# Patient Record
Sex: MALE | Race: WHITE | NOT HISPANIC OR LATINO | Employment: PART TIME | URBAN - METROPOLITAN AREA
[De-identification: names, ages, dates, MRNs, and addresses within clinical notes are randomized per-mention and may not be internally consistent; named-entity substitution may affect disease eponyms.]

---

## 2016-12-14 LAB — CRC RECOMMENDATION EXT: NORMAL

## 2017-01-19 ENCOUNTER — OFFICE VISIT (OUTPATIENT)
Dept: FAMILY MEDICINE | Facility: CLINIC | Age: 62
End: 2017-01-19
Payer: COMMERCIAL

## 2017-01-19 VITALS
HEART RATE: 82 BPM | SYSTOLIC BLOOD PRESSURE: 128 MMHG | TEMPERATURE: 98 F | BODY MASS INDEX: 39.97 KG/M2 | DIASTOLIC BLOOD PRESSURE: 70 MMHG | RESPIRATION RATE: 20 BRPM | HEIGHT: 71 IN | WEIGHT: 285.5 LBS

## 2017-01-19 DIAGNOSIS — J32.9 SINUSITIS, UNSPECIFIED CHRONICITY, UNSPECIFIED LOCATION: Primary | ICD-10-CM

## 2017-01-19 PROCEDURE — 1159F MED LIST DOCD IN RCRD: CPT | Mod: S$GLB,,, | Performed by: FAMILY MEDICINE

## 2017-01-19 PROCEDURE — 3078F DIAST BP <80 MM HG: CPT | Mod: S$GLB,,, | Performed by: FAMILY MEDICINE

## 2017-01-19 PROCEDURE — 99213 OFFICE O/P EST LOW 20 MIN: CPT | Mod: 25,S$GLB,, | Performed by: FAMILY MEDICINE

## 2017-01-19 PROCEDURE — 3074F SYST BP LT 130 MM HG: CPT | Mod: S$GLB,,, | Performed by: FAMILY MEDICINE

## 2017-01-19 PROCEDURE — 96372 THER/PROPH/DIAG INJ SC/IM: CPT | Mod: S$GLB,,, | Performed by: FAMILY MEDICINE

## 2017-01-19 PROCEDURE — 99999 PR PBB SHADOW E&M-EST. PATIENT-LVL II: CPT | Mod: PBBFAC,,, | Performed by: FAMILY MEDICINE

## 2017-01-19 RX ORDER — METHYLPREDNISOLONE ACETATE 40 MG/ML
60 INJECTION, SUSPENSION INTRA-ARTICULAR; INTRALESIONAL; INTRAMUSCULAR; SOFT TISSUE
Status: COMPLETED | OUTPATIENT
Start: 2017-01-19 | End: 2017-01-19

## 2017-01-19 RX ORDER — CLARITHROMYCIN 500 MG/1
500 TABLET, FILM COATED ORAL 2 TIMES DAILY
Qty: 20 TABLET | Refills: 0 | Status: SHIPPED | OUTPATIENT
Start: 2017-01-19 | End: 2017-01-29

## 2017-01-19 RX ADMIN — METHYLPREDNISOLONE ACETATE 60 MG: 40 INJECTION, SUSPENSION INTRA-ARTICULAR; INTRALESIONAL; INTRAMUSCULAR; SOFT TISSUE at 05:01

## 2017-01-19 NOTE — MR AVS SNAPSHOT
[facility] Medicine  [address] [place] [address]  Phone: [phone]  Fax: [phone]                  Shawn Badillodaro   2017 4:15 PM   Office Visit    Description:  Male : 1955   Provider:  Jaison Tesfaye MD   Department:  [facility]           Reason for Visit     Cough     Nasal Congestion           Diagnoses this Visit        Comments    Sinusitis, unspecified chronicity, unspecified location    -  Primary            To Do List           Goals (5 Years of Data)     None       These Medications        Disp Refills Start End    clarithromycin (BIAXIN) 500 MG tablet 20 tablet 0 2017    Take 1 tablet (500 mg total) by mouth 2 (two) times daily. - Oral    Pharmacy: [address] #: [phone]         [facility]     [facility] -  Assistance  Registered nurses in the [facility] Center provide clinical advisement, health education, appointment booking, and other advisory services.  Call for this free service at [phone].             Medications           Message regarding Medications     Verify the changes and/or additions to your medication regime listed below are the same as discussed with your clinician today.  If any of these changes or additions are incorrect, please notify your healthcare provider.        START taking these NEW medications        Refills    clarithromycin (BIAXIN) 500 MG tablet 0    Sig: Take 1 tablet (500 mg total) by mouth 2 (two) times daily.    Class: Normal    Route: Oral      These medications were administered today        Dose Freq    methylPREDNISolone acetate injection 60 mg 60 mg Clinic/HOD 1 time    Sig: Inject 1.5 mLs (60 mg total) into the muscle one time.    Class: Normal    Route: Intramuscular      STOP taking these medications     predniSONE (DELTASONE) 10 MG tablet Take 4 po q day x 4 days, then 3 po q day  "x 3 days, then 2 po q day x 2 days, then 1 po x 1           Verify that the below list of medications is an accurate representation of the medications you are currently taking.  If none reported, the list may be blank. If incorrect, please contact your healthcare provider. Carry this list with you in case of emergency.           Current Medications     albuterol 90 mcg/actuation inhaler Inhale 2 puffs into the lungs every 6 (six) hours as needed for Wheezing.    b complex vitamins capsule Take 1 capsule by mouth every other day.    cetirizine (ZYRTEC) 10 MG tablet Take 1 tablet by mouth Daily. 1 Tablet Oral Every day    diltiazem (DILACOR XR) 240 MG CDCR Take 240 mg by mouth once daily.     fish oil-dha-epa (FISH OIL) 1,200-144-216 mg Cap Take 1 tablet by mouth Daily. 2 Capsule Oral Every day    GLUCOSAMINE HCL/CHONDR GARVIN A NA (OSTEO BI-FLEX ORAL) Take by mouth once daily.    multivitamin (THERAGRAN) per tablet Take 2 tablets by mouth Daily. 1 Tablet Oral Every day    olmesartan-hydrochlorothiazide (BENICAR HCT) 40-25 mg per tablet Take 1 tablet by mouth Daily. 1 Tablet Oral Every day    pravastatin (PRAVACHOL) 40 MG tablet Take 1 tablet by mouth Daily. 1 Tablet Oral Every day    ranitidine (ZANTAC) 300 MG tablet Take 1 tablet by mouth Daily. 1 Tablet Oral Twice a day     rivaroxaban (XARELTO) 20 mg Tab Take 20 mg by mouth once daily. Dr Fail    SYMBICORT 160-4.5 mcg/actuation HFAA USE 2 INHALATIONS TWICE A DAY    clarithromycin (BIAXIN) 500 MG tablet Take 1 tablet (500 mg total) by mouth 2 (two) times daily.           Clinical Reference Information           Vital Signs - Last Recorded  Most recent update: 1/19/2017  4:55 PM by Karine Zabala LPN    BP Pulse Temp Resp    128/70 (BP Location: Right arm, Patient Position: Sitting, BP Method: Manual) 82 98 °F (36.7 °C) (Tympanic) 20    Ht Wt BMI    5' 11" (1.803 m) 129.5 kg (285 lb 7.9 oz) 39.82 kg/m2      Blood Pressure          Most Recent Value    BP  128/70 "      Allergies as of 1/19/2017     Augmentin [Amoxicillin-pot Clavulanate]    Niaspan Extended-release  [Niacin]    Phenergan [Promethazine]    Phenytoin Sodium Extended      Immunizations Administered on Date of Encounter - 1/19/2017     None

## 2017-01-19 NOTE — PROGRESS NOTES
Subjective:       Patient ID: Shawn Hand is a 61 y.o. male.    Chief Complaint: Cough and Nasal Congestion    Pt is 61 y.o. male who c/o 7 day h/o sinus congestion and h/a. Pos PND and cough.  No relief with OTC antihistamine/decongestant cold preparations. Severity is moderate.  Review of Systems   Constitutional: Negative for fever.   HENT: Positive for congestion, postnasal drip and sinus pressure.    Respiratory: Positive for cough.        Objective:    Physical Exam   Constitutional: He appears well-developed and well-nourished.   HENT:   Head: Normocephalic and atraumatic.   Right Ear: Tympanic membrane and external ear normal.   Left Ear: Tympanic membrane and external ear normal.   Nose: Mucosal edema and rhinorrhea present.   Mouth/Throat: Uvula is midline.   Eyes: Conjunctivae are normal.   Neck: Trachea normal and normal range of motion. Neck supple. No thyromegaly present.   Cardiovascular: Normal rate, regular rhythm, S1 normal, S2 normal and normal heart sounds.    No murmur heard.  Pulmonary/Chest: Effort normal. No respiratory distress.   Abdominal: Soft. Normal appearance. There is no tenderness.   Musculoskeletal: Normal range of motion.   Lymphadenopathy:     He has no cervical adenopathy.   Neurological: He is alert.   Skin: Skin is warm, dry and intact.   Psychiatric: He has a normal mood and affect. His speech is normal.   Vitals reviewed.      Assessment:       1. Sinusitis, unspecified chronicity, unspecified location        Plan:   Shawn HANNAH Sr was seen today for cough and nasal congestion.    Diagnoses and all orders for this visit:    Sinusitis, unspecified chronicity, unspecified location  -     methylPREDNISolone acetate injection 60 mg; Inject 1.5 mLs (60 mg total) into the muscle one time.  -     clarithromycin (BIAXIN) 500 MG tablet; Take 1 tablet (500 mg total) by mouth 2 (two) times daily.      No Follow-up on file.

## 2017-02-14 ENCOUNTER — OFFICE VISIT (OUTPATIENT)
Dept: FAMILY MEDICINE | Facility: CLINIC | Age: 62
End: 2017-02-14
Payer: COMMERCIAL

## 2017-02-14 VITALS
WEIGHT: 294 LBS | HEIGHT: 71 IN | HEART RATE: 96 BPM | OXYGEN SATURATION: 96 % | TEMPERATURE: 98 F | SYSTOLIC BLOOD PRESSURE: 138 MMHG | RESPIRATION RATE: 20 BRPM | DIASTOLIC BLOOD PRESSURE: 80 MMHG | BODY MASS INDEX: 41.16 KG/M2

## 2017-02-14 DIAGNOSIS — R09.81 CONGESTION OF NASAL SINUS: ICD-10-CM

## 2017-02-14 DIAGNOSIS — J43.9 PULMONARY EMPHYSEMA, UNSPECIFIED EMPHYSEMA TYPE: ICD-10-CM

## 2017-02-14 DIAGNOSIS — R05.9 COUGH: Primary | ICD-10-CM

## 2017-02-14 DIAGNOSIS — J30.2 SEASONAL ALLERGIC RHINITIS, UNSPECIFIED ALLERGIC RHINITIS TRIGGER: ICD-10-CM

## 2017-02-14 PROCEDURE — 99213 OFFICE O/P EST LOW 20 MIN: CPT | Mod: 25,S$GLB,, | Performed by: FAMILY MEDICINE

## 2017-02-14 PROCEDURE — 99999 PR PBB SHADOW E&M-EST. PATIENT-LVL III: CPT | Mod: PBBFAC,,, | Performed by: FAMILY MEDICINE

## 2017-02-14 PROCEDURE — 3075F SYST BP GE 130 - 139MM HG: CPT | Mod: S$GLB,,, | Performed by: FAMILY MEDICINE

## 2017-02-14 PROCEDURE — 96372 THER/PROPH/DIAG INJ SC/IM: CPT | Mod: S$GLB,,, | Performed by: FAMILY MEDICINE

## 2017-02-14 PROCEDURE — 3079F DIAST BP 80-89 MM HG: CPT | Mod: S$GLB,,, | Performed by: FAMILY MEDICINE

## 2017-02-14 RX ORDER — MONTELUKAST SODIUM 10 MG/1
10 TABLET ORAL NIGHTLY
Qty: 30 TABLET | Refills: 5 | Status: SHIPPED | OUTPATIENT
Start: 2017-02-14 | End: 2017-03-16

## 2017-02-14 RX ORDER — METHYLPREDNISOLONE ACETATE 40 MG/ML
60 INJECTION, SUSPENSION INTRA-ARTICULAR; INTRALESIONAL; INTRAMUSCULAR; SOFT TISSUE
Status: COMPLETED | OUTPATIENT
Start: 2017-02-14 | End: 2017-02-14

## 2017-02-14 RX ORDER — ALBUTEROL SULFATE 1.25 MG/3ML
1.25 SOLUTION RESPIRATORY (INHALATION) EVERY 6 HOURS PRN
Qty: 90 ML | Refills: 5 | Status: SHIPPED | OUTPATIENT
Start: 2017-02-14 | End: 2018-03-27 | Stop reason: SDUPTHER

## 2017-02-14 RX ORDER — FLUTICASONE PROPIONATE 50 MCG
2 SPRAY, SUSPENSION (ML) NASAL DAILY
Qty: 1 BOTTLE | Refills: 5 | Status: SHIPPED | OUTPATIENT
Start: 2017-02-14 | End: 2017-03-16

## 2017-02-14 RX ADMIN — METHYLPREDNISOLONE ACETATE 60 MG: 40 INJECTION, SUSPENSION INTRA-ARTICULAR; INTRALESIONAL; INTRAMUSCULAR; SOFT TISSUE at 05:02

## 2017-02-14 NOTE — MR AVS SNAPSHOT
[address] [address]  Phone: [phone]  Fax: [phone]                  Shawn Hand   2017 3:45 PM   Office Visit    Description:  Male : 1955   Provider:  Jaison Tesfaye MD   Department:  [facility]           Reason for Visit     Nasal Congestion     Cough     Headache           Diagnoses this Visit        Comments    Cough    -  Primary     Congestion of nasal sinus         Seasonal allergic rhinitis, unspecified allergic rhinitis trigger         Pulmonary emphysema, unspecified emphysema type                To Do List           Goals (5 Years of Data)     None       These Medications        Disp Refills Start End    montelukast (SINGULAIR) 10 mg tablet 30 tablet 5 2017 3/16/2017    Take 1 tablet (10 mg total) by mouth every evening. - Oral    Pharmacy: [address] Ph #: [phone]       fluticasone (FLONASE) 50 mcg/actuation nasal spray 1 Bottle 5 2017 3/16/2017    2 sprays by Each Nare route once daily. - Each Nare    Pharmacy: [address] Ph #: [phone]       albuterol (ACCUNEB) 1.25 mg/3 mL Nebu 90 mL 5 2017    Take 3 mLs (1.25 mg total) by nebulization every 6 (six) hours as needed. Rescue - Nebulization    Pharmacy: [address] Ph #: [phone]         [facility]     [facility] On Call Nurse Care Line -  Assistance  Registered nurses in the [facility] provide clinical advisement, health education, appointment booking, and other advisory services.  Call for this free service at [phone].             Medications           Message regarding Medications     Verify the changes and/or additions to your medication regime listed below are the same as discussed with your clinician today.  If any of these changes or  additions are incorrect, please notify your healthcare provider.        START taking these NEW medications        Refills    montelukast (SINGULAIR) 10 mg tablet 5    Sig: Take 1 tablet (10 mg total) by mouth every evening.    Class: Print    Route: Oral    fluticasone (FLONASE) 50 mcg/actuation nasal spray 5    Si sprays by Each Nare route once daily.    Class: Normal    Route: Each Nare    albuterol (ACCUNEB) 1.25 mg/3 mL Nebu 5    Sig: Take 3 mLs (1.25 mg total) by nebulization every 6 (six) hours as needed. Rescue    Class: Normal    Route: Nebulization      These medications were administered today        Dose Freq    methylPREDNISolone acetate injection 60 mg 60 mg Clinic/HOD 1 time    Sig: Inject 1.5 mLs (60 mg total) into the muscle one time.    Class: Normal    Route: Intramuscular           Verify that the below list of medications is an accurate representation of the medications you are currently taking.  If none reported, the list may be blank. If incorrect, please contact your healthcare provider. Carry this list with you in case of emergency.           Current Medications     albuterol 90 mcg/actuation inhaler Inhale 2 puffs into the lungs every 6 (six) hours as needed for Wheezing.    b complex vitamins capsule Take 1 capsule by mouth every other day.    cetirizine (ZYRTEC) 10 MG tablet Take 1 tablet by mouth Daily. 1 Tablet Oral Every day    diltiazem (DILACOR XR) 240 MG CDCR Take 240 mg by mouth once daily.     fish oil-dha-epa (FISH OIL) 1,200-144-216 mg Cap Take 1 tablet by mouth Daily. 2 Capsule Oral Every day    GLUCOSAMINE HCL/CHONDR GARVIN A NA (OSTEO BI-FLEX ORAL) Take by mouth once daily.    multivitamin (THERAGRAN) per tablet Take 2 tablets by mouth Daily. 1 Tablet Oral Every day    olmesartan-hydrochlorothiazide (BENICAR HCT) 40-25 mg per tablet Take 1 tablet by mouth Daily. 1 Tablet Oral Every day    pravastatin (PRAVACHOL) 40 MG tablet Take 1 tablet by mouth Daily. 1 Tablet Oral Every  "day    ranitidine (ZANTAC) 300 MG tablet Take 1 tablet by mouth Daily. 1 Tablet Oral Twice a day     rivaroxaban (XARELTO) 20 mg Tab Take 20 mg by mouth once daily. Dr Seamus    SYMBICORT 160-4.5 mcg/actuation HFAA USE 2 INHALATIONS TWICE A DAY    albuterol (ACCUNEB) 1.25 mg/3 mL Nebu Take 3 mLs (1.25 mg total) by nebulization every 6 (six) hours as needed. Rescue    fluticasone (FLONASE) 50 mcg/actuation nasal spray 2 sprays by Each Nare route once daily.    montelukast (SINGULAIR) 10 mg tablet Take 1 tablet (10 mg total) by mouth every evening.           Clinical Reference Information           Your Vitals Were     BP Pulse Temp Resp Height Weight    138/80 (BP Location: Left arm, Patient Position: Sitting, BP Method: Manual) 96 98.2 °F (36.8 °C) (Tympanic) 20 5' 11" (1.803 m) 133.4 kg (294 lb)    SpO2 BMI             96% 41 kg/m2         Blood Pressure          Most Recent Value    BP  138/80      Allergies as of 2/14/2017     Augmentin [Amoxicillin-pot Clavulanate]    Niaspan Extended-release  [Niacin]    Phenergan [Promethazine]    Phenytoin Sodium Extended      Immunizations Administered on Date of Encounter - 2/14/2017     None      Language Assistance Services     ATTENTION: Language assistance services are available, free of charge. Please call [phone].      ATENCIÓN: Si habla español, tiene a delacruz disposición servicios gratuitos de asistencia lingüística. Llame al [phone].     CHÚ Ý: N?u b?n nói Ti?ng Vi?t, có các d?ch v? h? tr? ngôn ng? mi?n phí dành cho b?n. G?i s? [phone].         [facility] complies with applicable Federal civil rights laws and does not discriminate on the basis of race, color, national origin, age, disability, or sex.        "

## 2017-02-14 NOTE — PROGRESS NOTES
Subjective:       Patient ID: Shawn Hand is a 61 y.o. male.    Chief Complaint: Nasal Congestion; Cough; and Headache    Pt is a 61 y.o. male who presents for evaluation and management of   Encounter Diagnoses   Name Primary?    Cough Yes    Congestion of nasal sinus     Seasonal allergic rhinitis, unspecified allergic rhinitis trigger     Pulmonary emphysema, unspecified emphysema type    .  Doing well on current meds. Denies any side effects. Prevention is up to date.    Review of Systems   Constitutional: Negative for fatigue and fever.   HENT: Positive for congestion, postnasal drip, rhinorrhea, sinus pressure and sneezing.    Eyes: Positive for itching.   Respiratory: Negative for cough and wheezing.    Cardiovascular: Negative for chest pain and palpitations.   Gastrointestinal: Negative for diarrhea, nausea and vomiting.   Genitourinary: Negative.    Musculoskeletal: Negative.    Skin: Negative.    Neurological: Negative.  Negative for headaches.   Hematological: Negative for adenopathy.   Psychiatric/Behavioral: Negative.        Objective:      Physical Exam   Constitutional: He is oriented to person, place, and time. He appears well-developed and well-nourished.   HENT:   Head: Normocephalic and atraumatic.   Right Ear: External ear normal.   Left Ear: External ear normal.   Mouth/Throat: Oropharynx is clear and moist.   Clear rhinorrhea   Eyes: Conjunctivae and EOM are normal. Pupils are equal, round, and reactive to light. Right eye exhibits discharge. Left eye exhibits no discharge. No scleral icterus.   Clear watery d/c   Neck: Normal range of motion. Neck supple. No JVD present. No tracheal deviation present. No thyromegaly present.   Cardiovascular: Normal rate, regular rhythm, normal heart sounds and intact distal pulses.    No murmur heard.  Pulmonary/Chest: Effort normal and breath sounds normal. No respiratory distress. He has no wheezes. He has no rales. He exhibits no tenderness.    Abdominal: Soft. Bowel sounds are normal. He exhibits no distension and no mass. There is no tenderness. There is no rebound and no guarding.   Genitourinary: Penis normal. No penile tenderness.   Musculoskeletal: Normal range of motion.   Lymphadenopathy:     He has no cervical adenopathy.   Neurological: He is alert and oriented to person, place, and time. He has normal reflexes. No cranial nerve deficit. He exhibits normal muscle tone. Coordination normal.   Skin: Skin is warm and dry.   Psychiatric: He has a normal mood and affect. His behavior is normal. Judgment and thought content normal.       Assessment:       1. Cough    2. Congestion of nasal sinus    3. Seasonal allergic rhinitis, unspecified allergic rhinitis trigger    4. Pulmonary emphysema, unspecified emphysema type        Plan:   Shawn HANNAH Sr was seen today for nasal congestion, cough and headache.    Diagnoses and all orders for this visit:    Cough    Congestion of nasal sinus    Seasonal allergic rhinitis, unspecified allergic rhinitis trigger  -     montelukast (SINGULAIR) 10 mg tablet; Take 1 tablet (10 mg total) by mouth every evening.  -     fluticasone (FLONASE) 50 mcg/actuation nasal spray; 2 sprays by Each Nare route once daily.  -     albuterol (ACCUNEB) 1.25 mg/3 mL Nebu; Take 3 mLs (1.25 mg total) by nebulization every 6 (six) hours as needed. Rescue  -     methylPREDNISolone acetate injection 60 mg; Inject 1.5 mLs (60 mg total) into the muscle one time.    Pulmonary emphysema, unspecified emphysema type  -     methylPREDNISolone acetate injection 60 mg; Inject 1.5 mLs (60 mg total) into the muscle one time.      No Follow-up on file.

## 2017-04-10 ENCOUNTER — OFFICE VISIT (OUTPATIENT)
Dept: FAMILY MEDICINE | Facility: CLINIC | Age: 62
End: 2017-04-10
Payer: COMMERCIAL

## 2017-04-10 VITALS
RESPIRATION RATE: 16 BRPM | WEIGHT: 284.38 LBS | HEART RATE: 80 BPM | DIASTOLIC BLOOD PRESSURE: 76 MMHG | HEIGHT: 72 IN | SYSTOLIC BLOOD PRESSURE: 132 MMHG | BODY MASS INDEX: 38.52 KG/M2

## 2017-04-10 DIAGNOSIS — J30.1 SEASONAL ALLERGIC RHINITIS DUE TO POLLEN: Primary | ICD-10-CM

## 2017-04-10 PROCEDURE — 3075F SYST BP GE 130 - 139MM HG: CPT | Mod: S$GLB,,, | Performed by: FAMILY MEDICINE

## 2017-04-10 PROCEDURE — 99213 OFFICE O/P EST LOW 20 MIN: CPT | Mod: S$GLB,,, | Performed by: FAMILY MEDICINE

## 2017-04-10 PROCEDURE — 3078F DIAST BP <80 MM HG: CPT | Mod: S$GLB,,, | Performed by: FAMILY MEDICINE

## 2017-04-10 PROCEDURE — 99999 PR PBB SHADOW E&M-EST. PATIENT-LVL III: CPT | Mod: PBBFAC,,, | Performed by: FAMILY MEDICINE

## 2017-04-10 PROCEDURE — 1160F RVW MEDS BY RX/DR IN RCRD: CPT | Mod: S$GLB,,, | Performed by: FAMILY MEDICINE

## 2017-04-10 RX ORDER — MONTELUKAST SODIUM 10 MG/1
10 TABLET ORAL DAILY
COMMUNITY
Start: 2017-04-03 | End: 2018-08-30 | Stop reason: SDUPTHER

## 2017-04-10 RX ORDER — PROMETHAZINE HYDROCHLORIDE AND CODEINE PHOSPHATE 6.25; 1 MG/5ML; MG/5ML
5 SOLUTION ORAL NIGHTLY PRN
Qty: 118 ML | Refills: 0 | Status: SHIPPED | OUTPATIENT
Start: 2017-04-10 | End: 2017-04-20

## 2017-04-10 RX ORDER — METHYLPREDNISOLONE 4 MG/1
TABLET ORAL
Qty: 1 PACKAGE | Refills: 0 | Status: SHIPPED | OUTPATIENT
Start: 2017-04-10 | End: 2017-05-01

## 2017-04-10 NOTE — MR AVS SNAPSHOT
[facility] Medicine  [address] [place] [address]  Phone: [phone]  Fax: [phone]                  Shawn Geero   4/10/2017 6:45 PM   Office Visit    Description:  Male : 1955   Provider:  Jaison Tesfaye MD   Department:  [facility]           Reason for Visit     URI     Irregular Heart Beat           Diagnoses this Visit        Comments    Seasonal allergic rhinitis due to pollen    -  Primary            To Do List           Goals (5 Years of Data)     None       These Medications        Disp Refills Start End    methylPREDNISolone (MEDROL DOSEPACK) 4 mg tablet 1 Package 0 4/10/2017 2017    use as directed    Pharmacy: [address] #: [phone]         [facility] On Call     [facility] -  Assistance  Unless otherwise directed by your provider, please contact [facility], our nurse care line that is available for  assistance.     Registered nurses in the [facility] Center provide: appointment scheduling, clinical advisement, health education, and other advisory services.  Call: [phone] (toll free)               Medications           Message regarding Medications     Verify the changes and/or additions to your medication regime listed below are the same as discussed with your clinician today.  If any of these changes or additions are incorrect, please notify your healthcare provider.        START taking these NEW medications        Refills    methylPREDNISolone (MEDROL DOSEPACK) 4 mg tablet 0    Sig: use as directed    Class: Normal           Verify that the below list of medications is an accurate representation of the medications you are currently taking.  If none reported, the list may be blank. If incorrect, please contact your healthcare provider. Carry this list with you in case of emergency.           Current Medications     albuterol  (ACCUNEB) 1.25 mg/3 mL Nebu Take 3 mLs (1.25 mg total) by nebulization every 6 (six) hours as needed. Rescue    albuterol 90 mcg/actuation inhaler Inhale 2 puffs into the lungs every 6 (six) hours as needed for Wheezing.    b complex vitamins capsule Take 1 capsule by mouth every other day.    cetirizine (ZYRTEC) 10 MG tablet Take 1 tablet by mouth Daily. 1 Tablet Oral Every day    diltiazem (DILACOR XR) 240 MG CDCR Take 240 mg by mouth once daily.     fish oil-dha-epa (FISH OIL) 1,200-144-216 mg Cap Take 1 tablet by mouth Daily. 2 Capsule Oral Every day    GLUCOSAMINE HCL/CHONDR GARVIN A NA (OSTEO BI-FLEX ORAL) Take by mouth once daily.    multivitamin (THERAGRAN) per tablet Take 2 tablets by mouth Daily. 1 Tablet Oral Every day    olmesartan-hydrochlorothiazide (BENICAR HCT) 40-25 mg per tablet Take 1 tablet by mouth Daily. 1 Tablet Oral Every day    pravastatin (PRAVACHOL) 40 MG tablet Take 1 tablet by mouth Daily. 1 Tablet Oral Every day    ranitidine (ZANTAC) 300 MG tablet Take 1 tablet by mouth Daily. 1 Tablet Oral Twice a day     rivaroxaban (XARELTO) 20 mg Tab Take 20 mg by mouth once daily. Dr Way    SYMBICORT 160-4.5 mcg/actuation HFAA USE 2 INHALATIONS TWICE A DAY    methylPREDNISolone (MEDROL DOSEPACK) 4 mg tablet use as directed    montelukast (SINGULAIR) 10 mg tablet            Clinical Reference Information           Your Vitals Were     BP Pulse Resp Height Weight BMI    132/76 (BP Location: Left arm, Patient Position: Sitting, BP Method: Manual) 80 16 6' (1.829 m) 129 kg (284 lb 6.3 oz) 38.57 kg/m2      Blood Pressure          Most Recent Value    BP  132/76      Allergies as of 4/10/2017     Augmentin [Amoxicillin-pot Clavulanate]    Niaspan Extended-release  [Niacin]    Phenergan [Promethazine]    Phenytoin Sodium Extended      Immunizations Administered on Date of Encounter - 4/10/2017     None      Orders Placed During Today's Visit      Normal Orders This Visit    Ambulatory referral to Allergy        Language Assistance Services     ATTENTION: Language assistance services are available, free of charge. Please call [phone].      ATENCIÓN: Si habla kerry, tiene a delacruz disposición servicios gratuitos de asistencia lingüística. Llame al [phone].     CHÚ Ý: N?u b?n nói Ti?ng Vi?t, có các d?ch v? h? tr? ngôn ng? mi?n phí dành cho b?n. G?i s? [phone].         [facility] complies with applicable Federal civil rights laws and does not discriminate on the basis of race, color, national origin, age, disability, or sex.

## 2017-04-11 NOTE — PROGRESS NOTES
Subjective:       Patient ID: Shawn Hand is a 61 y.o. male.    Chief Complaint: URI (allergies ) and Irregular Heart Beat    Pt is 61 y.o. male who c/o 2 day h/o sinus congestion and h/a after cutting grass over weekend. Pos PND and cough.  No relief with OTC antihistamine/decongestant cold preparations. Severity is moderate.  Review of Systems   Constitutional: Negative for fatigue and fever.   HENT: Positive for congestion, postnasal drip, rhinorrhea, sinus pressure and sneezing.    Eyes: Positive for itching.   Respiratory: Negative for cough and wheezing.    Cardiovascular: Negative for chest pain and palpitations.   Gastrointestinal: Negative for diarrhea, nausea and vomiting.   Genitourinary: Negative.    Musculoskeletal: Negative.    Skin: Negative.    Neurological: Negative.  Negative for headaches.   Hematological: Negative for adenopathy.   Psychiatric/Behavioral: Negative.        Objective:    Physical Exam   Constitutional: He is oriented to person, place, and time. He appears well-developed and well-nourished.   HENT:   Head: Normocephalic and atraumatic.   Right Ear: External ear normal.   Left Ear: External ear normal.   Mouth/Throat: Oropharynx is clear and moist.   Clear rhinorrhea   Eyes: Conjunctivae and EOM are normal. Pupils are equal, round, and reactive to light. Right eye exhibits discharge. Left eye exhibits no discharge. No scleral icterus.   Clear watery d/c   Neck: Normal range of motion. Neck supple. No JVD present. No tracheal deviation present. No thyromegaly present.   Cardiovascular: Normal rate, regular rhythm, normal heart sounds and intact distal pulses.    No murmur heard.  Pulmonary/Chest: Effort normal and breath sounds normal. No respiratory distress. He has no wheezes. He has no rales. He exhibits no tenderness.   Abdominal: Soft. Bowel sounds are normal. He exhibits no distension and no mass. There is no tenderness. There is no rebound and no guarding.    Genitourinary: Penis normal. No penile tenderness.   Musculoskeletal: Normal range of motion.   Lymphadenopathy:     He has no cervical adenopathy.   Neurological: He is alert and oriented to person, place, and time. He has normal reflexes. No cranial nerve deficit. He exhibits normal muscle tone. Coordination normal.   Skin: Skin is warm and dry.   Psychiatric: He has a normal mood and affect. His behavior is normal. Judgment and thought content normal.       Assessment:       1. Seasonal allergic rhinitis due to pollen        Plan:   Shawn HANNAH Sr was seen today for uri and irregular heart beat.    Diagnoses and all orders for this visit:    Seasonal allergic rhinitis due to pollen  -     Ambulatory referral to Allergy  -     methylPREDNISolone (MEDROL DOSEPACK) 4 mg tablet; use as directed    Other orders  -     promethazine-codeine 6.25-10 mg/5 ml (PHENERGAN WITH CODEINE) 6.25-10 mg/5 mL syrup; Take 5 mLs by mouth nightly as needed for Cough.    continue singulair, zyrtec, flonase   No Follow-up on file.

## 2017-06-06 ENCOUNTER — TELEPHONE (OUTPATIENT)
Dept: FAMILY MEDICINE | Facility: CLINIC | Age: 62
End: 2017-06-06

## 2017-06-06 RX ORDER — CLARITHROMYCIN 500 MG/1
500 TABLET, FILM COATED ORAL EVERY 12 HOURS
Qty: 20 TABLET | Refills: 0 | Status: SHIPPED | OUTPATIENT
Start: 2017-06-06 | End: 2017-06-30

## 2017-06-06 NOTE — TELEPHONE ENCOUNTER
----- Message from Roman Barker sent at 2017  8:29 AM CDT -----  Contact: Wife - Sarah Hand  MRN: [id number]  : 1955  PCP: Jaison Tesfaye  Home Phone      [phone]  Work Phone      Not on file.  Mobile          [phone]      MESSAGE: sinus congestion again -- requesting refill on Biaxin -- uses [facility]    Call [phone]    PCP: Mera

## 2017-06-26 PROBLEM — M25.511 ACUTE PAIN OF RIGHT SHOULDER: Status: ACTIVE | Noted: 2017-06-26

## 2017-06-27 PROBLEM — M75.100 ROTATOR CUFF TEAR: Status: ACTIVE | Noted: 2017-06-27

## 2017-06-27 PROBLEM — M75.101 TEAR OF RIGHT ROTATOR CUFF: Status: ACTIVE | Noted: 2017-06-27

## 2017-06-30 ENCOUNTER — OFFICE VISIT (OUTPATIENT)
Dept: INTERNAL MEDICINE | Facility: CLINIC | Age: 62
End: 2017-06-30
Payer: COMMERCIAL

## 2017-06-30 ENCOUNTER — TELEPHONE (OUTPATIENT)
Dept: FAMILY MEDICINE | Facility: CLINIC | Age: 62
End: 2017-06-30

## 2017-06-30 VITALS
HEIGHT: 71 IN | TEMPERATURE: 98 F | BODY MASS INDEX: 42.28 KG/M2 | SYSTOLIC BLOOD PRESSURE: 122 MMHG | HEART RATE: 58 BPM | DIASTOLIC BLOOD PRESSURE: 78 MMHG | OXYGEN SATURATION: 95 % | WEIGHT: 302 LBS | RESPIRATION RATE: 20 BRPM

## 2017-06-30 DIAGNOSIS — E66.01 MORBID OBESITY WITH BMI OF 40.0-44.9, ADULT: ICD-10-CM

## 2017-06-30 DIAGNOSIS — I10 BENIGN ESSENTIAL HTN: ICD-10-CM

## 2017-06-30 DIAGNOSIS — I48.19 PERSISTENT ATRIAL FIBRILLATION: ICD-10-CM

## 2017-06-30 DIAGNOSIS — R60.9 EDEMA, UNSPECIFIED TYPE: Primary | ICD-10-CM

## 2017-06-30 PROCEDURE — 99999 PR PBB SHADOW E&M-EST. PATIENT-LVL IV: CPT | Mod: PBBFAC,,, | Performed by: NURSE PRACTITIONER

## 2017-06-30 PROCEDURE — 99214 OFFICE O/P EST MOD 30 MIN: CPT | Mod: S$GLB,,, | Performed by: NURSE PRACTITIONER

## 2017-06-30 RX ORDER — FUROSEMIDE 20 MG/1
20 TABLET ORAL DAILY
Qty: 30 TABLET | Refills: 0 | Status: SHIPPED | OUTPATIENT
Start: 2017-06-30 | End: 2017-12-27

## 2017-06-30 RX ORDER — POTASSIUM CHLORIDE 750 MG/1
10 TABLET, EXTENDED RELEASE ORAL DAILY
Qty: 30 TABLET | Refills: 0 | Status: SHIPPED | OUTPATIENT
Start: 2017-06-30 | End: 2017-10-02

## 2017-06-30 RX ORDER — DILTIAZEM HYDROCHLORIDE 240 MG/1
CAPSULE, EXTENDED RELEASE ORAL
COMMUNITY
Start: 2017-04-03 | End: 2018-04-30 | Stop reason: SDUPTHER

## 2017-06-30 NOTE — PROGRESS NOTES
Subjective:       Patient ID: Shawn Hand is a 61 y.o. male.    Chief Complaint: Edema (x 3 days - weight gain- swelling all over)    Patient is unknown, to me and presents with   Chief Complaint   Patient presents with    Edema     x 3 days - weight gain- swelling all over   .  Denies chest pain and shortness of breath.  Patient presents with LE edema since having surgery for rotator cuff on this past Tuesday. Wearing brace at this time. But since his sx he noticed weight gain and edema. Patient is not sob but worried about edema. Patient does have hx of a fib and is seeing [facility] in [place]. Feels fine other than having swelling to LE. Has copd as well   HPI  Review of Systems   Constitutional: Negative.  Negative for activity change, appetite change, chills, diaphoresis, fatigue, fever and unexpected weight change.   HENT: Negative.  Negative for congestion, ear discharge, ear pain, facial swelling, hearing loss, nosebleeds, postnasal drip, rhinorrhea, sinus pressure, sneezing, sore throat, tinnitus, trouble swallowing and voice change.    Eyes: Negative.  Negative for photophobia, pain, discharge, redness, itching and visual disturbance.   Respiratory: Negative.  Negative for apnea, cough, choking, chest tightness, shortness of breath, wheezing and stridor.    Cardiovascular: Positive for leg swelling. Negative for chest pain and palpitations.   Gastrointestinal: Negative for abdominal distention, abdominal pain, anal bleeding, blood in stool, constipation, diarrhea, nausea and vomiting.   Genitourinary: Negative.  Negative for difficulty urinating, discharge, dysuria, enuresis, flank pain, frequency, hematuria, penile pain, penile swelling, scrotal swelling, testicular pain and urgency.   Musculoskeletal: Positive for arthralgias. Negative for back pain, gait problem, joint swelling, myalgias, neck pain and neck stiffness.   Skin: Negative.  Negative for color change, pallor, rash and wound.   Neurological:  Negative for dizziness, tremors, seizures, syncope, facial asymmetry, speech difficulty, weakness, light-headedness, numbness and headaches.   Hematological: Negative for adenopathy. Does not bruise/bleed easily.   Psychiatric/Behavioral: Negative.  Negative for agitation, sleep disturbance and suicidal ideas. The patient is not nervous/anxious.        Objective:      Physical Exam   Constitutional: He is oriented to person, place, and time. He appears well-developed and well-nourished. No distress.   HENT:   Head: Normocephalic and atraumatic.   Right Ear: External ear normal.   Left Ear: External ear normal.   Nose: Nose normal.   Mouth/Throat: Oropharynx is clear and moist. No oropharyngeal exudate.   Eyes: Conjunctivae and EOM are normal. Pupils are equal, round, and reactive to light. Right eye exhibits no discharge. Left eye exhibits no discharge. No scleral icterus.   Neck: Normal range of motion. No JVD present. No tracheal deviation present. No thyromegaly present.   Cardiovascular: Normal rate, normal heart sounds and intact distal pulses.  An irregularly irregular rhythm present. Exam reveals no gallop and no friction rub.    No murmur heard.  Pulmonary/Chest: Effort normal. No stridor. No respiratory distress. He has wheezes. He has no rales. He exhibits no tenderness.   Abdominal: Soft. Bowel sounds are normal. He exhibits no distension and no mass. There is no tenderness. There is no rebound and no guarding.   Musculoskeletal: He exhibits edema and tenderness.   + 2 edema to LE. Splint to right shoulder    Lymphadenopathy:     He has no cervical adenopathy.   Neurological: He is alert and oriented to person, place, and time. He has normal reflexes. He displays normal reflexes. No cranial nerve deficit. He exhibits normal muscle tone. Coordination normal.   Skin: Skin is warm and dry. Capillary refill takes less than 2 seconds. No rash noted. He is not diaphoretic. No erythema. No pallor.   Psychiatric:  "He has a normal mood and affect. His behavior is normal. Judgment and thought content normal.   Nursing note and vitals reviewed.      Assessment:       1. Edema, unspecified type    2. Persistent atrial fibrillation    3. Benign essential HTN    4. Morbid obesity with BMI of 40.0-44.9, adult        Plan:   Shawn HANNAH Sr was seen today for edema.    Diagnoses and all orders for this visit:    Edema, unspecified type  -     furosemide (LASIX) 20 MG tablet; Take 1 tablet (20 mg total) by mouth once daily.  -     potassium chloride (KLOR-CON) 10 MEQ TbSR; Take 1 tablet (10 mEq total) by mouth once daily.    Persistent atrial fibrillation    Benign essential HTN    Morbid obesity with BMI of 40.0-44.9, adult    "This note will not be shared with the patient."  Will treat with lasix and kcl  Treat over weekend and will call me on Monday  Last GFR greater than 60 on 6/19  RTC as scheduled     "

## 2017-06-30 NOTE — TELEPHONE ENCOUNTER
----- Message from [place] sent at 2017  8:09 AM CDT -----  Contact: phyllis / wife  Shawn Hand  MRN: [id number]  : 1955  PCP: Jaison Tesfaye  Home Phone      [phone]  Work Phone      Not on file.  Mobile          [phone]      MESSAGE: madison pt, needs a call back asap, pt had rotator cuff surgery Tuesday, and since then he has put on 12 lbs, very swollen. Please call to advise.    Phone: [phone]

## 2017-07-03 ENCOUNTER — TELEPHONE (OUTPATIENT)
Dept: INTERNAL MEDICINE | Facility: CLINIC | Age: 62
End: 2017-07-03

## 2017-07-03 NOTE — TELEPHONE ENCOUNTER
----- Message from Zaynab Ta MA sent at 7/3/2017 12:49 PM CDT -----  Contact: Wife-Sarah Hand  MRN: [id number]  : 1955  PCP: Jaison Tesfaye  Home Phone      [phone]  Work Phone      Not on file.  Mobile          [phone]      MESSAGE: Patient started on fluid pill on Friday Night. His wife says he has lost 5 punds but his feet are swelling.      Please call Sarah  [phone]

## 2017-07-07 RX ORDER — CLARITHROMYCIN 500 MG/1
500 TABLET, FILM COATED ORAL EVERY 12 HOURS
Qty: 20 TABLET | Refills: 0 | Status: SHIPPED | OUTPATIENT
Start: 2017-07-07 | End: 2017-12-27

## 2017-08-15 DIAGNOSIS — J06.9 URI (UPPER RESPIRATORY INFECTION): ICD-10-CM

## 2017-08-15 RX ORDER — MONTELUKAST SODIUM 10 MG/1
TABLET ORAL
Qty: 30 TABLET | Refills: 10 | OUTPATIENT
Start: 2017-08-15

## 2017-08-16 DIAGNOSIS — J06.9 URI (UPPER RESPIRATORY INFECTION): ICD-10-CM

## 2017-08-16 RX ORDER — MONTELUKAST SODIUM 10 MG/1
TABLET ORAL
Qty: 30 TABLET | Refills: 10 | Status: SHIPPED | OUTPATIENT
Start: 2017-08-16 | End: 2017-10-02 | Stop reason: SDUPTHER

## 2017-10-02 ENCOUNTER — OFFICE VISIT (OUTPATIENT)
Dept: FAMILY MEDICINE | Facility: CLINIC | Age: 62
End: 2017-10-02
Payer: COMMERCIAL

## 2017-10-02 VITALS
HEIGHT: 71 IN | RESPIRATION RATE: 20 BRPM | WEIGHT: 296.06 LBS | DIASTOLIC BLOOD PRESSURE: 68 MMHG | BODY MASS INDEX: 41.45 KG/M2 | HEART RATE: 60 BPM | SYSTOLIC BLOOD PRESSURE: 128 MMHG

## 2017-10-02 DIAGNOSIS — E66.01 MORBID OBESITY: ICD-10-CM

## 2017-10-02 DIAGNOSIS — M79.672 PAIN OF LEFT HEEL: ICD-10-CM

## 2017-10-02 DIAGNOSIS — Z12.5 SCREENING FOR PROSTATE CANCER: ICD-10-CM

## 2017-10-02 DIAGNOSIS — R60.9 EDEMA, UNSPECIFIED TYPE: Primary | ICD-10-CM

## 2017-10-02 DIAGNOSIS — I10 ESSENTIAL HYPERTENSION: ICD-10-CM

## 2017-10-02 PROCEDURE — 99999 PR PBB SHADOW E&M-EST. PATIENT-LVL II: CPT | Mod: PBBFAC,,, | Performed by: FAMILY MEDICINE

## 2017-10-02 PROCEDURE — 99214 OFFICE O/P EST MOD 30 MIN: CPT | Mod: S$GLB,,, | Performed by: FAMILY MEDICINE

## 2017-10-02 RX ORDER — POTASSIUM CHLORIDE 750 MG/1
TABLET, EXTENDED RELEASE ORAL
COMMUNITY
Start: 2017-06-30 | End: 2017-10-02

## 2017-10-02 NOTE — PROGRESS NOTES
Subjective:       Patient ID: Shawn Hand is a 61 y.o. male.    Chief Complaint: Foot Pain (left foot) and Edema (in legs)    Pt is a 61 y.o. male who presents for evaluation and management of   Encounter Diagnoses   Name Primary?    Edema, unspecified type Yes    Essential hypertension     Pain of left heel     Morbid obesity     Screening for prostate cancer    .had not gotten his benicarHCT in 2 weeks and started swelling as a result. Now back on it for 3 days   Lasix was prescribed by Hua. He is diuresing well, finished lasix       Doing well on current meds. Denies any side effects. Prevention is up to date.    Review of Systems   Respiratory: Negative for shortness of breath.    Cardiovascular: Positive for leg swelling. Negative for chest pain.   Musculoskeletal:        Left heel pain        Objective:      Physical Exam   Constitutional: He is oriented to person, place, and time. He appears well-developed and well-nourished.   HENT:   Head: Normocephalic and atraumatic.   Right Ear: External ear normal.   Left Ear: External ear normal.   Nose: Nose normal.   Mouth/Throat: Oropharynx is clear and moist.   Eyes: Conjunctivae and EOM are normal. Pupils are equal, round, and reactive to light. Right eye exhibits no discharge. Left eye exhibits no discharge. No scleral icterus.   Neck: Normal range of motion. Neck supple. No JVD present. No tracheal deviation present. No thyromegaly present.   Cardiovascular: Normal rate, regular rhythm, normal heart sounds and intact distal pulses.    No murmur heard.  Pulmonary/Chest: Effort normal and breath sounds normal. No respiratory distress. He has no wheezes. He has no rales. He exhibits no tenderness.   Abdominal: Soft. Bowel sounds are normal. He exhibits no distension and no mass. There is no tenderness. There is no rebound and no guarding.   Musculoskeletal: Normal range of motion. He exhibits edema and tenderness.   TTP left heel    Lymphadenopathy:      He has no cervical adenopathy.   Neurological: He is alert and oriented to person, place, and time. He has normal reflexes. He displays normal reflexes. No cranial nerve deficit. He exhibits normal muscle tone. Coordination normal.   Skin: Skin is warm and dry.   Psychiatric: He has a normal mood and affect. His behavior is normal. Judgment and thought content normal.       Assessment:       1. Edema, unspecified type    2. Essential hypertension    3. Pain of left heel    4. Morbid obesity    5. Screening for prostate cancer        Plan:   Shawn HANNAH Sr was seen today for foot pain and edema.    Diagnoses and all orders for this visit:    Edema, unspecified type    Essential hypertension    Pain of left heel    Morbid obesity    Screening for prostate cancer  -     PSA, Screening; Future    arch support for heel  The patient is asked to make an attempt to improve diet and exercise patterns to aid in medical management of this problem.  5 small meals a day. Cut out white carbs: bread, rice, pasta, potatoes. Exercise/walk 5x/week for at least 30 minutes  .  Consider PT   continue ARB, HCT     No Follow-up on file.

## 2017-12-27 ENCOUNTER — OFFICE VISIT (OUTPATIENT)
Dept: FAMILY MEDICINE | Facility: CLINIC | Age: 62
End: 2017-12-27
Payer: COMMERCIAL

## 2017-12-27 VITALS
HEART RATE: 88 BPM | SYSTOLIC BLOOD PRESSURE: 120 MMHG | BODY MASS INDEX: 41.79 KG/M2 | WEIGHT: 298.5 LBS | DIASTOLIC BLOOD PRESSURE: 66 MMHG | HEIGHT: 71 IN | RESPIRATION RATE: 20 BRPM

## 2017-12-27 DIAGNOSIS — E78.5 HYPERLIPIDEMIA, UNSPECIFIED HYPERLIPIDEMIA TYPE: ICD-10-CM

## 2017-12-27 DIAGNOSIS — Z12.5 SCREENING FOR PROSTATE CANCER: ICD-10-CM

## 2017-12-27 DIAGNOSIS — J43.8 OTHER EMPHYSEMA: ICD-10-CM

## 2017-12-27 DIAGNOSIS — I10 ESSENTIAL HYPERTENSION: ICD-10-CM

## 2017-12-27 DIAGNOSIS — I48.19 PERSISTENT ATRIAL FIBRILLATION: ICD-10-CM

## 2017-12-27 DIAGNOSIS — R22.1 MASS OF LATERAL NECK: Primary | ICD-10-CM

## 2017-12-27 PROCEDURE — 99214 OFFICE O/P EST MOD 30 MIN: CPT | Mod: S$GLB,,, | Performed by: FAMILY MEDICINE

## 2017-12-27 PROCEDURE — 99999 PR PBB SHADOW E&M-EST. PATIENT-LVL III: CPT | Mod: PBBFAC,,, | Performed by: FAMILY MEDICINE

## 2017-12-28 NOTE — PROGRESS NOTES
Subjective:       Patient ID: Shawn Hand is a 62 y.o. male.    Chief Complaint: Follow-up    Pt is a 62 y.o. male who presents for evaluation and management of   Encounter Diagnoses   Name Primary?    Mass of lateral neck Yes    Essential hypertension     Other emphysema     Persistent atrial fibrillation     Hyperlipidemia, unspecified hyperlipidemia type    .  Doing well on current meds. Denies any side effects. Prevention is up to date.  Review of Systems   Constitutional: Negative for fever.   Respiratory: Negative for shortness of breath.    Cardiovascular: Negative for chest pain.   Gastrointestinal: Negative for anal bleeding and blood in stool.   Genitourinary: Negative for dysuria.   Neurological: Negative for dizziness and light-headedness.       Objective:      Physical Exam   Constitutional: He is oriented to person, place, and time. He appears well-developed and well-nourished.   HENT:   Head: Normocephalic and atraumatic.   Right Ear: External ear normal.   Left Ear: External ear normal.   Nose: Nose normal.   Mouth/Throat: Oropharynx is clear and moist.   Left neck with 1.5cm mass, not completely mobile. Somewhat indurated.  Doesn't feel like a typical LN    Eyes: Conjunctivae and EOM are normal. Pupils are equal, round, and reactive to light. Right eye exhibits no discharge. Left eye exhibits no discharge. No scleral icterus.   Neck: Normal range of motion. Neck supple. No JVD present. No tracheal deviation present. No thyromegaly present.   Cardiovascular: Normal rate, regular rhythm, normal heart sounds and intact distal pulses.    No murmur heard.  Pulmonary/Chest: Effort normal and breath sounds normal. No respiratory distress. He has no wheezes. He has no rales. He exhibits no tenderness.   Abdominal: Soft. Bowel sounds are normal. He exhibits no distension and no mass. There is no tenderness. There is no rebound and no guarding.   Musculoskeletal: Normal range of motion.    Lymphadenopathy:     He has no cervical adenopathy.   Neurological: He is alert and oriented to person, place, and time. He has normal reflexes. He displays normal reflexes. No cranial nerve deficit. He exhibits normal muscle tone. Coordination normal.   Skin: Skin is warm and dry.   Psychiatric: He has a normal mood and affect. His behavior is normal. Judgment and thought content normal.       Assessment:       1. Mass of lateral neck    2. Essential hypertension    3. Other emphysema    4. Persistent atrial fibrillation    5. Hyperlipidemia, unspecified hyperlipidemia type        Plan:   Shawn HANNAH  was seen today for follow-up.    Diagnoses and all orders for this visit:    Mass of lateral neck  -     US Soft Tissue Misc; Future    Essential hypertension    Other emphysema    Persistent atrial fibrillation    Hyperlipidemia, unspecified hyperlipidemia type    Screening for prostate cancer  -     PSA, Screening; Future    continue same see orders   Getting u/s to rule out abnormal LAD. If suspicious node, will get CT head and neck     rtc 6 months     No Follow-up on file.

## 2018-01-03 ENCOUNTER — HOSPITAL ENCOUNTER (OUTPATIENT)
Dept: RADIOLOGY | Facility: HOSPITAL | Age: 63
Discharge: HOME | End: 2018-01-03
Attending: FAMILY MEDICINE
Payer: COMMERCIAL

## 2018-01-03 DIAGNOSIS — R22.1 MASS OF LATERAL NECK: ICD-10-CM

## 2018-01-03 PROCEDURE — 76536 US EXAM OF HEAD AND NECK: CPT | Mod: 26,,, | Performed by: RADIOLOGY

## 2018-01-03 PROCEDURE — 76536 US EXAM OF HEAD AND NECK: CPT | Mod: TC

## 2018-01-17 ENCOUNTER — TELEPHONE (OUTPATIENT)
Dept: ADMINISTRATIVE | Facility: HOSPITAL | Age: 63
End: 2018-01-17

## 2018-02-09 DIAGNOSIS — J43.9 PULMONARY EMPHYSEMA, UNSPECIFIED EMPHYSEMA TYPE: ICD-10-CM

## 2018-02-09 RX ORDER — ALBUTEROL SULFATE 90 UG/1
AEROSOL, METERED RESPIRATORY (INHALATION)
Qty: 18 G | Refills: 4 | Status: SHIPPED | OUTPATIENT
Start: 2018-02-09 | End: 2018-04-30

## 2018-03-17 ENCOUNTER — OFFICE VISIT (OUTPATIENT)
Dept: FAMILY MEDICINE | Facility: CLINIC | Age: 63
End: 2018-03-17
Payer: COMMERCIAL

## 2018-03-17 VITALS
SYSTOLIC BLOOD PRESSURE: 130 MMHG | HEIGHT: 71 IN | DIASTOLIC BLOOD PRESSURE: 82 MMHG | BODY MASS INDEX: 40.04 KG/M2 | WEIGHT: 286 LBS | HEART RATE: 82 BPM | TEMPERATURE: 98 F | RESPIRATION RATE: 20 BRPM

## 2018-03-17 DIAGNOSIS — J43.9 PULMONARY EMPHYSEMA, UNSPECIFIED EMPHYSEMA TYPE: ICD-10-CM

## 2018-03-17 DIAGNOSIS — G89.29 CHRONIC LEFT SHOULDER PAIN: ICD-10-CM

## 2018-03-17 DIAGNOSIS — R05.9 COUGH: ICD-10-CM

## 2018-03-17 DIAGNOSIS — J44.9 CHRONIC OBSTRUCTIVE PULMONARY DISEASE, UNSPECIFIED COPD TYPE: ICD-10-CM

## 2018-03-17 DIAGNOSIS — M25.512 CHRONIC LEFT SHOULDER PAIN: ICD-10-CM

## 2018-03-17 DIAGNOSIS — J02.9 PHARYNGITIS, UNSPECIFIED ETIOLOGY: Primary | ICD-10-CM

## 2018-03-17 DIAGNOSIS — J32.9 SINUSITIS, UNSPECIFIED CHRONICITY, UNSPECIFIED LOCATION: ICD-10-CM

## 2018-03-17 PROCEDURE — 3079F DIAST BP 80-89 MM HG: CPT | Mod: CPTII,S$GLB,, | Performed by: NURSE PRACTITIONER

## 2018-03-17 PROCEDURE — 99999 PR PBB SHADOW E&M-EST. PATIENT-LVL IV: CPT | Mod: PBBFAC,,, | Performed by: NURSE PRACTITIONER

## 2018-03-17 PROCEDURE — 99213 OFFICE O/P EST LOW 20 MIN: CPT | Mod: 25,S$GLB,, | Performed by: NURSE PRACTITIONER

## 2018-03-17 PROCEDURE — 3075F SYST BP GE 130 - 139MM HG: CPT | Mod: CPTII,S$GLB,, | Performed by: NURSE PRACTITIONER

## 2018-03-17 PROCEDURE — 96372 THER/PROPH/DIAG INJ SC/IM: CPT | Mod: S$GLB,,, | Performed by: FAMILY MEDICINE

## 2018-03-17 RX ORDER — ALBUTEROL SULFATE 90 UG/1
2 AEROSOL, METERED RESPIRATORY (INHALATION) EVERY 6 HOURS PRN
Qty: 1 EACH | Refills: 5 | Status: SHIPPED | OUTPATIENT
Start: 2018-03-17 | End: 2020-12-07 | Stop reason: ALTCHOICE

## 2018-03-17 RX ORDER — CELECOXIB 200 MG/1
CAPSULE ORAL
COMMUNITY
Start: 2018-02-26 | End: 2018-03-17 | Stop reason: SDUPTHER

## 2018-03-17 RX ORDER — ALBUTEROL SULFATE 1.25 MG/3ML
SOLUTION RESPIRATORY (INHALATION)
COMMUNITY
Start: 2018-01-24 | End: 2021-12-10 | Stop reason: SDUPTHER

## 2018-03-17 RX ORDER — METHYLPREDNISOLONE ACETATE 40 MG/ML
40 INJECTION, SUSPENSION INTRA-ARTICULAR; INTRALESIONAL; INTRAMUSCULAR; SOFT TISSUE
Status: COMPLETED | OUTPATIENT
Start: 2018-03-17 | End: 2018-03-17

## 2018-03-17 RX ORDER — CLARITHROMYCIN 500 MG/1
500 TABLET, FILM COATED ORAL EVERY 12 HOURS
Qty: 20 TABLET | Refills: 0 | Status: SHIPPED | OUTPATIENT
Start: 2018-03-17 | End: 2018-03-27

## 2018-03-17 RX ORDER — PROMETHAZINE HYDROCHLORIDE AND CODEINE PHOSPHATE 6.25; 1 MG/5ML; MG/5ML
5 SOLUTION ORAL EVERY 4 HOURS PRN
Qty: 180 ML | Refills: 0 | Status: SHIPPED | OUTPATIENT
Start: 2018-03-17 | End: 2018-03-27

## 2018-03-17 RX ORDER — CELECOXIB 200 MG/1
200 CAPSULE ORAL DAILY
Qty: 30 CAPSULE | Refills: 5 | Status: SHIPPED | OUTPATIENT
Start: 2018-03-17 | End: 2018-04-30

## 2018-03-17 RX ADMIN — METHYLPREDNISOLONE ACETATE 40 MG: 40 INJECTION, SUSPENSION INTRA-ARTICULAR; INTRALESIONAL; INTRAMUSCULAR; SOFT TISSUE at 11:03

## 2018-03-17 NOTE — PROGRESS NOTES
Subjective:       Patient ID: Shawn Hand is a 62 y.o. male.    Chief Complaint: Sore Throat and Nasal Congestion    Sore Throat    The current episode started in the past 7 days. There has been no fever. Associated symptoms include congestion and coughing. Pertinent negatives include no abdominal pain, diarrhea, ear pain, headaches, shortness of breath or vomiting.     Review of Systems   Constitutional: Negative.  Negative for appetite change, fatigue and fever.   HENT: Positive for congestion and sore throat. Negative for ear pain.    Eyes: Negative.  Negative for visual disturbance.   Respiratory: Positive for cough. Negative for shortness of breath and wheezing.    Cardiovascular: Negative.    Gastrointestinal: Negative.  Negative for abdominal pain, diarrhea, nausea and vomiting.   Genitourinary: Negative.  Negative for difficulty urinating.   Musculoskeletal: Negative.    Skin: Negative.  Negative for rash.   Neurological: Negative.  Negative for headaches.   Psychiatric/Behavioral: Negative.  Negative for sleep disturbance. The patient is not nervous/anxious.    All other systems reviewed and are negative.      Objective:      Physical Exam   Constitutional: He appears well-developed and well-nourished.   HENT:   Head: Normocephalic and atraumatic.   Right Ear: Tympanic membrane and external ear normal.   Left Ear: Tympanic membrane and external ear normal.   Nose: Mucosal edema and rhinorrhea present.   Mouth/Throat: Uvula is midline. Posterior oropharyngeal erythema present.   Eyes: Conjunctivae are normal.   Neck: Trachea normal and normal range of motion. Neck supple. No thyromegaly present.   Cardiovascular: Normal rate, regular rhythm, S1 normal, S2 normal and normal heart sounds.    No murmur heard.  Pulmonary/Chest: Effort normal and breath sounds normal. No respiratory distress.   Coarse cough   Abdominal: Soft. Normal appearance.   Musculoskeletal: Normal range of motion.   Lymphadenopathy:      He has no cervical adenopathy.   Neurological: He is alert.   Skin: Skin is warm, dry and intact.   Psychiatric: He has a normal mood and affect. His speech is normal.   Nursing note and vitals reviewed.      Assessment:       1. Pharyngitis, unspecified etiology    2. Sinusitis, unspecified chronicity, unspecified location    3. Chronic obstructive pulmonary disease, unspecified COPD type    4. Cough    5. Pulmonary emphysema, unspecified emphysema type    6. Chronic left shoulder pain        Plan:   Shawn HANNAH Sr was seen today for sore throat and nasal congestion.    Diagnoses and all orders for this visit:    Pharyngitis, unspecified etiology  -     clarithromycin (BIAXIN) 500 MG tablet; Take 1 tablet (500 mg total) by mouth every 12 (twelve) hours.  -     methylPREDNISolone acetate injection 40 mg; Inject 1 mL (40 mg total) into the muscle one time.    Sinusitis, unspecified chronicity, unspecified location  -     clarithromycin (BIAXIN) 500 MG tablet; Take 1 tablet (500 mg total) by mouth every 12 (twelve) hours.  -     methylPREDNISolone acetate injection 40 mg; Inject 1 mL (40 mg total) into the muscle one time.    Chronic obstructive pulmonary disease, unspecified COPD type  -     albuterol 90 mcg/actuation inhaler; Inhale 2 puffs into the lungs every 6 (six) hours as needed for Wheezing.    Cough  -     promethazine-codeine 6.25-10 mg/5 ml (PHENERGAN WITH CODEINE) 6.25-10 mg/5 mL syrup; Take 5 mLs by mouth every 4 (four) hours as needed for Cough.    Pulmonary emphysema, unspecified emphysema type  -     albuterol 90 mcg/actuation inhaler; Inhale 2 puffs into the lungs every 6 (six) hours as needed for Wheezing.    Chronic left shoulder pain  -     celecoxib (CELEBREX) 200 MG capsule; Take 1 capsule (200 mg total) by mouth once daily.    RTC PRN

## 2018-03-27 DIAGNOSIS — J30.2 SEASONAL ALLERGIC RHINITIS: ICD-10-CM

## 2018-03-27 RX ORDER — ALBUTEROL SULFATE 1.25 MG/3ML
SOLUTION RESPIRATORY (INHALATION)
Qty: 1 BOX | Refills: 4 | Status: SHIPPED | OUTPATIENT
Start: 2018-03-27 | End: 2018-04-30 | Stop reason: SDUPTHER

## 2018-04-30 ENCOUNTER — TELEPHONE (OUTPATIENT)
Dept: FAMILY MEDICINE | Facility: CLINIC | Age: 63
End: 2018-04-30

## 2018-04-30 ENCOUNTER — OFFICE VISIT (OUTPATIENT)
Dept: FAMILY MEDICINE | Facility: CLINIC | Age: 63
End: 2018-04-30
Payer: COMMERCIAL

## 2018-04-30 VITALS
DIASTOLIC BLOOD PRESSURE: 72 MMHG | RESPIRATION RATE: 18 BRPM | SYSTOLIC BLOOD PRESSURE: 116 MMHG | HEIGHT: 71 IN | HEART RATE: 82 BPM | BODY MASS INDEX: 40.09 KG/M2 | WEIGHT: 286.38 LBS

## 2018-04-30 DIAGNOSIS — M54.50 ACUTE RIGHT-SIDED LOW BACK PAIN WITHOUT SCIATICA: Primary | ICD-10-CM

## 2018-04-30 PROCEDURE — 99999 PR PBB SHADOW E&M-EST. PATIENT-LVL III: CPT | Mod: PBBFAC,,, | Performed by: FAMILY MEDICINE

## 2018-04-30 PROCEDURE — 99213 OFFICE O/P EST LOW 20 MIN: CPT | Mod: S$GLB,,, | Performed by: FAMILY MEDICINE

## 2018-04-30 PROCEDURE — 3074F SYST BP LT 130 MM HG: CPT | Mod: CPTII,S$GLB,, | Performed by: FAMILY MEDICINE

## 2018-04-30 PROCEDURE — 3078F DIAST BP <80 MM HG: CPT | Mod: CPTII,S$GLB,, | Performed by: FAMILY MEDICINE

## 2018-04-30 RX ORDER — CYCLOBENZAPRINE HCL 10 MG
10 TABLET ORAL NIGHTLY PRN
Qty: 30 TABLET | Refills: 1 | Status: SHIPPED | OUTPATIENT
Start: 2018-04-30 | End: 2018-05-30

## 2018-04-30 RX ORDER — DICLOFENAC SODIUM 75 MG/1
75 TABLET, DELAYED RELEASE ORAL 2 TIMES DAILY PRN
Qty: 60 TABLET | Refills: 1 | Status: SHIPPED | OUTPATIENT
Start: 2018-04-30 | End: 2018-05-30

## 2018-04-30 NOTE — PROGRESS NOTES
Subjective:       Patient ID: Shawn Hand is a 62 y.o. male.    Chief Complaint: Hip Pain (Right hip pain x 3 days.)    Pt is a 62 y.o. male who presents for evaluation and management of   Encounter Diagnosis   Name Primary?    Acute right-sided low back pain without sciatica Yes   3 days   No known injury  Was in his car most of the day before it started hurting     Doing well on current meds. Denies any side effects. Prevention is up to date.    Review of Systems   Musculoskeletal: Negative for joint swelling.       Objective:      Physical Exam   Constitutional: He is oriented to person, place, and time. He appears well-developed and well-nourished.   HENT:   Head: Normocephalic and atraumatic.   Right Ear: External ear normal.   Left Ear: External ear normal.   Nose: Nose normal.   Mouth/Throat: Oropharynx is clear and moist.   Eyes: Conjunctivae and EOM are normal. Pupils are equal, round, and reactive to light. Right eye exhibits no discharge. Left eye exhibits no discharge. No scleral icterus.   Neck: Normal range of motion. Neck supple. No JVD present. No tracheal deviation present. No thyromegaly present.   Cardiovascular: Normal rate, regular rhythm, normal heart sounds and intact distal pulses.  Exam reveals no gallop.    No murmur heard.  Pulmonary/Chest: Effort normal and breath sounds normal. No respiratory distress. He has no wheezes. He has no rales. He exhibits no tenderness.   Abdominal: Soft. Bowel sounds are normal. He exhibits no distension and no mass. There is no tenderness. There is no rebound and no guarding.   Musculoskeletal: Normal range of motion. He exhibits tenderness.   ILL tenderness. Neg SLR   Lymphadenopathy:     He has no cervical adenopathy.   Neurological: He is alert and oriented to person, place, and time. He has normal reflexes. He displays normal reflexes. No cranial nerve deficit. He exhibits normal muscle tone. Coordination normal.   Skin: Skin is warm and dry.    Psychiatric: He has a normal mood and affect. His behavior is normal. Judgment and thought content normal.       Assessment:       1. Acute right-sided low back pain without sciatica        Plan:   Shawn HANNAH Sr was seen today for hip pain.    Diagnoses and all orders for this visit:    Acute right-sided low back pain without sciatica  -     cyclobenzaprine (FLEXERIL) 10 MG tablet; Take 1 tablet (10 mg total) by mouth nightly as needed for Muscle spasms.  -     diclofenac (VOLTAREN) 75 MG EC tablet; Take 1 tablet (75 mg total) by mouth 2 (two) times daily as needed.      No Follow-up on file.

## 2018-04-30 NOTE — TELEPHONE ENCOUNTER
----- Message from Yaa Reyna MA sent at 2018  9:22 AM CDT -----  Contact: Sarah Hand  MRN: [id number]  : 1955  PCP: Jaison Tesfaye  Home Phone      [phone]  Work Phone      Not on file.  Mobile          [phone]      MESSAGE:   Pt requests a sooner appointment than the  can schedule.  Does patient feel like they need to be seen today:  YES  What is the nature of the appointment:  Hip and leg pain  What visit type:  Urgent  Did you check other providers/department schedules for availability:   Yes-only wants to see Dr Tesfaye      Phone:  [phone]

## 2018-05-07 ENCOUNTER — TELEPHONE (OUTPATIENT)
Dept: FAMILY MEDICINE | Facility: CLINIC | Age: 63
End: 2018-05-07

## 2018-05-07 NOTE — TELEPHONE ENCOUNTER
----- Message from Roman Barker sent at 2018  9:37 AM CDT -----  Contact: Wife - Sarah Hand  MRN: [id number]  : 1955  PCP: Jaison Tesfaye  Home Phone      [phone]  Work Phone      Not on file.  Mobile          [phone]      MESSAGE: attending the [employer] -- requesting excuse not to do exercise again this week due to his pulled back  -- please advise    Call [phone]    PCP: Mera

## 2018-06-01 NOTE — TELEPHONE ENCOUNTER
----- Message from Selena Smyth sent at 2018 12:15 PM CDT -----  Contact: Self  Shawn aHnd  MRN: [id number]  : 1955  PCP: Jaison Tesfaye  Home Phone      [phone]  Work Phone      Not on file.  Mobile          [phone]      MESSAGE:   Pt requesting refill or new Rx.   Is this a refill or new RX:  refill  RX name and strength: SYMBICORT 160-4.5 mcg/actuation HFAA  Last office visit: 18  Is this a 30-day or 90-day RX:  30  Pharmacy name and location:  [facility]AN [facility]  Comments:      Phone:  [phone]

## 2018-06-03 RX ORDER — BUDESONIDE AND FORMOTEROL FUMARATE DIHYDRATE 160; 4.5 UG/1; UG/1
2 AEROSOL RESPIRATORY (INHALATION) 2 TIMES DAILY
Qty: 30.6 G | Refills: 3 | Status: SHIPPED | OUTPATIENT
Start: 2018-06-03 | End: 2018-10-01 | Stop reason: SDUPTHER

## 2018-08-18 ENCOUNTER — HOSPITAL ENCOUNTER (EMERGENCY)
Facility: HOSPITAL | Age: 63
Discharge: HOME | End: 2018-08-18
Attending: SURGERY
Payer: COMMERCIAL

## 2018-08-18 VITALS
DIASTOLIC BLOOD PRESSURE: 78 MMHG | WEIGHT: 285 LBS | OXYGEN SATURATION: 96 % | HEART RATE: 78 BPM | TEMPERATURE: 96 F | SYSTOLIC BLOOD PRESSURE: 127 MMHG | BODY MASS INDEX: 39.75 KG/M2 | RESPIRATION RATE: 16 BRPM

## 2018-08-18 DIAGNOSIS — M79.672 LEFT FOOT PAIN: ICD-10-CM

## 2018-08-18 DIAGNOSIS — M10.9 ACUTE GOUT OF LEFT FOOT, UNSPECIFIED CAUSE: Primary | ICD-10-CM

## 2018-08-18 LAB
ALBUMIN SERPL BCP-MCNC: 3.9 G/DL
ALP SERPL-CCNC: 45 U/L
ALT SERPL W/O P-5'-P-CCNC: 53 U/L
ANION GAP SERPL CALC-SCNC: 10 MMOL/L
AST SERPL-CCNC: 44 U/L
BASOPHILS # BLD AUTO: 0.11 K/UL
BASOPHILS NFR BLD: 0.8 %
BILIRUB SERPL-MCNC: 0.5 MG/DL
BUN SERPL-MCNC: 26 MG/DL
CALCIUM SERPL-MCNC: 9.4 MG/DL
CHLORIDE SERPL-SCNC: 104 MMOL/L
CO2 SERPL-SCNC: 26 MMOL/L
CREAT SERPL-MCNC: 1.9 MG/DL
DIFFERENTIAL METHOD: ABNORMAL
EOSINOPHIL # BLD AUTO: 0.6 K/UL
EOSINOPHIL NFR BLD: 4.9 %
ERYTHROCYTE [DISTWIDTH] IN BLOOD BY AUTOMATED COUNT: 14.4 %
EST. GFR  (AFRICAN AMERICAN): 43 ML/MIN/1.73 M^2
EST. GFR  (NON AFRICAN AMERICAN): 37 ML/MIN/1.73 M^2
GLUCOSE SERPL-MCNC: 97 MG/DL
HCT VFR BLD AUTO: 45.8 %
HGB BLD-MCNC: 15.6 G/DL
LYMPHOCYTES # BLD AUTO: 2.6 K/UL
LYMPHOCYTES NFR BLD: 19.9 %
MCH RBC QN AUTO: 29.9 PG
MCHC RBC AUTO-ENTMCNC: 34.1 G/DL
MCV RBC AUTO: 88 FL
MONOCYTES # BLD AUTO: 1.5 K/UL
MONOCYTES NFR BLD: 11.5 %
NEUTROPHILS # BLD AUTO: 8.2 K/UL
NEUTROPHILS NFR BLD: 62.9 %
PLATELET # BLD AUTO: 294 K/UL
PMV BLD AUTO: 9.9 FL
POTASSIUM SERPL-SCNC: 4 MMOL/L
PROT SERPL-MCNC: 7.3 G/DL
RBC # BLD AUTO: 5.21 M/UL
SODIUM SERPL-SCNC: 140 MMOL/L
URATE SERPL-MCNC: 7.7 MG/DL
WBC # BLD AUTO: 13.06 K/UL

## 2018-08-18 PROCEDURE — 63600175 PHARM REV CODE 636 W HCPCS: Performed by: SURGERY

## 2018-08-18 PROCEDURE — 84550 ASSAY OF BLOOD/URIC ACID: CPT

## 2018-08-18 PROCEDURE — 80053 COMPREHEN METABOLIC PANEL: CPT

## 2018-08-18 PROCEDURE — 36415 COLL VENOUS BLD VENIPUNCTURE: CPT

## 2018-08-18 PROCEDURE — 85025 COMPLETE CBC W/AUTO DIFF WBC: CPT

## 2018-08-18 PROCEDURE — 99284 EMERGENCY DEPT VISIT MOD MDM: CPT | Mod: 25

## 2018-08-18 PROCEDURE — 96372 THER/PROPH/DIAG INJ SC/IM: CPT

## 2018-08-18 RX ORDER — MORPHINE SULFATE 2 MG/ML
2 INJECTION, SOLUTION INTRAMUSCULAR; INTRAVENOUS
Status: COMPLETED | OUTPATIENT
Start: 2018-08-18 | End: 2018-08-18

## 2018-08-18 RX ORDER — DICLOFENAC SODIUM AND MISOPROSTOL 75; 200 MG/1; UG/1
1 TABLET, DELAYED RELEASE ORAL 2 TIMES DAILY
COMMUNITY
End: 2018-08-18 | Stop reason: ALTCHOICE

## 2018-08-18 RX ORDER — ALLOPURINOL 100 MG/1
100 TABLET ORAL DAILY
Qty: 30 TABLET | Refills: 0 | Status: SHIPPED | OUTPATIENT
Start: 2018-08-18 | End: 2018-09-18 | Stop reason: SDUPTHER

## 2018-08-18 RX ORDER — ONDANSETRON 2 MG/ML
4 INJECTION INTRAMUSCULAR; INTRAVENOUS
Status: COMPLETED | OUTPATIENT
Start: 2018-08-18 | End: 2018-08-18

## 2018-08-18 RX ORDER — COLCHICINE 0.6 MG/1
0.6 TABLET ORAL DAILY PRN
Qty: 20 TABLET | Refills: 0 | Status: SHIPPED | OUTPATIENT
Start: 2018-08-18 | End: 2018-09-18 | Stop reason: SDUPTHER

## 2018-08-18 RX ORDER — HYDROCODONE BITARTRATE AND IBUPROFEN 7.5; 2 MG/1; MG/1
1 TABLET, FILM COATED ORAL EVERY 6 HOURS PRN
Qty: 15 TABLET | Refills: 0 | Status: SHIPPED | OUTPATIENT
Start: 2018-08-18 | End: 2018-08-28

## 2018-08-18 RX ADMIN — Medication 2 MG: at 08:08

## 2018-08-18 RX ADMIN — ONDANSETRON 4 MG: 2 INJECTION INTRAMUSCULAR; INTRAVENOUS at 08:08

## 2018-08-19 NOTE — ED PROVIDER NOTES
[facility]                                                 Chief Complaint  62 y.o. male with Foot Injury (left)    History of Present Illness  Shawn Hand presents to the emergency room with left foot pain today  Patient states he has had on again off again left foot pain for last couple weeks  Patient has left foot erythema, left great toe swelling, denies any acute trauma  Patient was diagnosed with plantar fasciitis weeks ago with no improvement  Patient has had several steroid shots to the left foot, continued pain at this time  Patient is neurovascular intact with no evidence of infection, uric acid is over 7    The history is provided by the patient   device was not used during this ER visit    Past Medical History   -- Atrial fibrillation    -- COPD (chronic obstructive pulmonary disease)    -- Coronary artery disease    -- GERD (gastroesophageal reflux disease)    -- Hyperlipidemia    -- Hypertension    -- Sleep apnea      Past Surgical History   -- CARDIOVERSION     -- CHOLECYSTECTOMY     -- COLONOSCOPY     -- CORONARY ANGIOPLASTY WITH STENT PLACEMENT     -- ROTATOR CUFF REPAIR     -- ROTATOR CUFF REPAIR     -- SINUS SURGERY     -- TONSILLECTOMY        Review of patient's allergies   -- Niaspan extended-release [niacin]    -- Augmentin [amoxicillin-pot clavulanate]      Review of Systems and Physical Exam      Review of Systems  -- Constitution - no fever, denies fatigue, no weakness, no chills  -- Eyes - no tearing or redness, no visual disturbance  -- Ear, Nose - no tinnitus or earache, no nasal congestion or discharge  -- Mouth,Throat - no sore throat, no toothache, normal voice, normal swallowing  -- Respiratory - denies cough and congestion, no shortness of breath, no GARCIA  -- Cardiovascular - denies chest pain, no palpitations, denies claudication  -- Gastrointestinal - denies abdominal pain, nausea, vomiting, or diarrhea  -- Genitourinary - no dysuria,  denies flank pain, no hematuria, no STD risk  -- Musculoskeletal - left foot pain, negative for myalgias and arthralgias   -- Neurological - no headache, denies weakness or seizure; no LOC  -- Skin - denies pallor, rash, or changes in skin. no hives or welts noted    /78   Pulse 78   Temp 96 °F (35.6 °C) (Oral)   Resp 16    Physical Exam  -- Nursing note and vitals reviewed  -- Constitutional: Appears well-developed and well-nourished  -- Head: Atraumatic. Normocephalic. No obvious abnormality  -- Eyes: Pupils are equal and reactive to light. Normal conjunctiva and lids  -- Cardiac: Normal rate, regular rhythm and normal heart sounds  -- Pulmonary: Normal respiratory effort, breath sounds clear to auscultation  -- Abdominal: Soft, no tenderness. Normal bowel sounds. Normal liver edge  -- Musculoskeletal: Normal range of motion, no effusions. Joints stable   -- Neurological: No focal deficits. Showed good interaction with staff  -- Vascular: Posterior tibial, dorsalis pedis and radial pulses 2+ bilaterally    -- Lymphatics: No cervical or peripheral lymphadenopathy. No edema noted  -- Skin: Left great toe redness in dorsal foot erythema    Emergency Room Course      Lab Results     K 4.0      CO2 26   BUN 26 (H)   CREATININE 1.9 (H)   GLU 97   ALKPHOS 45 (L)   AST 44 (H)   ALT 53 (H)   BILITOT 0.5   ALBUMIN 3.9   PROT 7.3   WBC 13.06 (H)   HGB 15.6   HCT 45.8        Radiology  -- Preliminary ER x-ray readings showed no evidence of fracture or dislocation  -- All x-rays are reviewed with a final disposition given by the radiologist    Medications Given  morphine injection 2 mg (2 mg Intramuscular Given 8/18/18 2021)   ondansetron injection 4 mg (4 mg Intramuscular Given 8/18/18 2021)     Diagnosis  -- The primary encounter diagnosis was Acute gout of left foot, unspecified cause.   -- A diagnosis of Left foot pain was also pertinent to this visit.    Disposition and Plan  -- Disposition:  home  -- Condition: stable  -- Follow-up: Patient to follow up with Jaison Tesfaye MD in 1-2 days.  -- I advised the patient that we have found no life threatening condition today  -- At this time, I believe the patient is clinically stable for discharge.   -- The patient acknowledges that close follow up with a MD is required   -- Patient agrees to comply with all instruction and direction given in the ER    This note is dictated on Dragon Natural Speaking word recognition program.  There are word recognition mistakes that are occasionally missed on review.         Jeevan aGrza MD  08/18/18 2949

## 2018-08-21 ENCOUNTER — NURSE TRIAGE (OUTPATIENT)
Dept: ADMINISTRATIVE | Facility: CLINIC | Age: 63
End: 2018-08-21

## 2018-08-21 ENCOUNTER — OFFICE VISIT (OUTPATIENT)
Dept: FAMILY MEDICINE | Facility: CLINIC | Age: 63
End: 2018-08-21
Payer: COMMERCIAL

## 2018-08-21 VITALS
BODY MASS INDEX: 40.74 KG/M2 | WEIGHT: 291 LBS | SYSTOLIC BLOOD PRESSURE: 118 MMHG | RESPIRATION RATE: 18 BRPM | HEIGHT: 71 IN | HEART RATE: 80 BPM | DIASTOLIC BLOOD PRESSURE: 78 MMHG

## 2018-08-21 DIAGNOSIS — N28.9 RENAL INSUFFICIENCY: Primary | ICD-10-CM

## 2018-08-21 DIAGNOSIS — M10.9 GOUT, ARTHRITIS: ICD-10-CM

## 2018-08-21 LAB
ANION GAP SERPL CALC-SCNC: 9 MMOL/L
BUN SERPL-MCNC: 16 MG/DL
CALCIUM SERPL-MCNC: 10.2 MG/DL
CHLORIDE SERPL-SCNC: 102 MMOL/L
CO2 SERPL-SCNC: 29 MMOL/L
CREAT SERPL-MCNC: 1 MG/DL
EST. GFR  (AFRICAN AMERICAN): >60 ML/MIN/1.73 M^2
EST. GFR  (NON AFRICAN AMERICAN): >60 ML/MIN/1.73 M^2
GLUCOSE SERPL-MCNC: 91 MG/DL
POTASSIUM SERPL-SCNC: 4.3 MMOL/L
SODIUM SERPL-SCNC: 140 MMOL/L

## 2018-08-21 PROCEDURE — 3074F SYST BP LT 130 MM HG: CPT | Mod: CPTII,S$GLB,, | Performed by: FAMILY MEDICINE

## 2018-08-21 PROCEDURE — 80048 BASIC METABOLIC PNL TOTAL CA: CPT

## 2018-08-21 PROCEDURE — 99214 OFFICE O/P EST MOD 30 MIN: CPT | Mod: S$GLB,,, | Performed by: FAMILY MEDICINE

## 2018-08-21 PROCEDURE — 36415 COLL VENOUS BLD VENIPUNCTURE: CPT | Mod: S$GLB,,, | Performed by: FAMILY MEDICINE

## 2018-08-21 PROCEDURE — 3008F BODY MASS INDEX DOCD: CPT | Mod: CPTII,S$GLB,, | Performed by: FAMILY MEDICINE

## 2018-08-21 PROCEDURE — 3078F DIAST BP <80 MM HG: CPT | Mod: CPTII,S$GLB,, | Performed by: FAMILY MEDICINE

## 2018-08-21 PROCEDURE — 99999 PR PBB SHADOW E&M-EST. PATIENT-LVL II: CPT | Mod: PBBFAC,,, | Performed by: FAMILY MEDICINE

## 2018-08-21 NOTE — PROGRESS NOTES
Subjective:       Patient ID: Shawn Hand is a 62 y.o. male.    Chief Complaint: Er follow up and Gout    Pt is a 62 y.o. male who presents for evaluation and management of   Encounter Diagnoses   Name Primary?    Renal insufficiency Yes    Gout, arthritis    .ED visit for acute gout left foot   Uric acid 7.7 in ED   rx colchicine and allopurinol   Creatinine noted to be 1.9 in ED. Baseline 0.9  Was taking diclofenac at home BID     Doing well on current meds. Denies any side effects. Prevention is up to date.  Review of Systems   Genitourinary: Negative for difficulty urinating and hematuria.   Musculoskeletal: Positive for arthralgias and joint swelling.       Objective:      Physical Exam   Constitutional: He is oriented to person, place, and time. He appears well-developed and well-nourished.   HENT:   Head: Normocephalic and atraumatic.   Right Ear: External ear normal.   Left Ear: External ear normal.   Nose: Nose normal.   Mouth/Throat: Oropharynx is clear and moist.   Eyes: Conjunctivae and EOM are normal. Pupils are equal, round, and reactive to light. Right eye exhibits no discharge. Left eye exhibits no discharge. No scleral icterus.   Neck: Normal range of motion. Neck supple. No JVD present. No tracheal deviation present. No thyromegaly present.   Cardiovascular: Normal rate, regular rhythm, normal heart sounds and intact distal pulses.   No murmur heard.  Pulmonary/Chest: Effort normal and breath sounds normal. No respiratory distress. He has no wheezes. He has no rales. He exhibits no tenderness.   Abdominal: Soft. Bowel sounds are normal. He exhibits no distension and no mass. There is no tenderness. There is no rebound and no guarding.   Musculoskeletal: Normal range of motion. He exhibits no tenderness.   Lymphadenopathy:     He has no cervical adenopathy.   Neurological: He is alert and oriented to person, place, and time. He has normal reflexes. He displays normal reflexes. No cranial  nerve deficit. He exhibits normal muscle tone. Coordination normal.   Skin: Skin is warm and dry.   Psychiatric: He has a normal mood and affect. His behavior is normal. Judgment and thought content normal.       Assessment:       1. Renal insufficiency    2. Gout, arthritis        Plan:   Shawn HANNAH Sr was seen today for er follow up and gout.    Diagnoses and all orders for this visit:    Renal insufficiency  -     Basic metabolic panel; Future    Gout, arthritis  -     Uric acid; Future      Problem List Items Addressed This Visit     None      Visit Diagnoses     Renal insufficiency    -  Primary    Relevant Orders    Basic metabolic panel    Gout, arthritis        Relevant Orders    Uric acid        Continue colchicine daily prn   Allopurinol 100 daily   Uric acid in month  BMP today   No NSAIDs!   Drink enough water to keep urine light yellow to clear.   RTC 1 month with labs       No Follow-up on file.

## 2018-08-22 RX ORDER — METHYLPREDNISOLONE 4 MG/1
TABLET ORAL
Qty: 1 PACKAGE | Refills: 0 | Status: SHIPPED | OUTPATIENT
Start: 2018-08-22 | End: 2018-09-18

## 2018-08-22 NOTE — TELEPHONE ENCOUNTER
Reason for Disposition   Caller has URGENT medication question about med that PCP prescribed and triager unable to answer question    Protocols used: ST MEDICATION QUESTION CALL-A-PRIYANKA HANNAH Sr's wife reporting he is still not having relief with taking prescribed allopurinol and colchicine. He has taken colchicine once today as prescribed. His wife is wondering if he can take an additional dose to help with symptoms. Spoke to Dr. Huerta who recommended he could take an additional 0.6 mg of colchicine now for pain relief and if not relieved to f/u in the morning with pcp to see if a steroid injection or anything else would be recommended. Advised wife. Please contact caller with any further care advice.

## 2018-08-30 RX ORDER — MONTELUKAST SODIUM 10 MG/1
TABLET ORAL
Qty: 30 TABLET | Refills: 10 | Status: SHIPPED | OUTPATIENT
Start: 2018-08-30 | End: 2019-07-23 | Stop reason: SDUPTHER

## 2018-09-17 ENCOUNTER — CLINICAL SUPPORT (OUTPATIENT)
Dept: FAMILY MEDICINE | Facility: CLINIC | Age: 63
End: 2018-09-17
Payer: COMMERCIAL

## 2018-09-17 DIAGNOSIS — M10.9 GOUT, ARTHRITIS: ICD-10-CM

## 2018-09-17 LAB — URATE SERPL-MCNC: 7 MG/DL

## 2018-09-17 PROCEDURE — 84550 ASSAY OF BLOOD/URIC ACID: CPT

## 2018-09-17 PROCEDURE — 99999 PR PBB SHADOW E&M-EST. PATIENT-LVL I: CPT | Mod: PBBFAC,,,

## 2018-09-17 PROCEDURE — 36415 COLL VENOUS BLD VENIPUNCTURE: CPT | Mod: S$GLB,,, | Performed by: FAMILY MEDICINE

## 2018-09-18 ENCOUNTER — OFFICE VISIT (OUTPATIENT)
Dept: FAMILY MEDICINE | Facility: CLINIC | Age: 63
End: 2018-09-18
Payer: COMMERCIAL

## 2018-09-18 ENCOUNTER — TELEPHONE (OUTPATIENT)
Dept: FAMILY MEDICINE | Facility: CLINIC | Age: 63
End: 2018-09-18

## 2018-09-18 VITALS
DIASTOLIC BLOOD PRESSURE: 70 MMHG | HEIGHT: 71 IN | SYSTOLIC BLOOD PRESSURE: 100 MMHG | WEIGHT: 290.38 LBS | BODY MASS INDEX: 40.65 KG/M2 | HEART RATE: 80 BPM | RESPIRATION RATE: 20 BRPM

## 2018-09-18 DIAGNOSIS — R09.81 CONGESTION OF NASAL SINUS: ICD-10-CM

## 2018-09-18 DIAGNOSIS — M10.9 GOUT, ARTHRITIS: ICD-10-CM

## 2018-09-18 DIAGNOSIS — M10.9 GOUT, ARTHRITIS: Primary | ICD-10-CM

## 2018-09-18 PROCEDURE — 99999 PR PBB SHADOW E&M-EST. PATIENT-LVL III: CPT | Mod: PBBFAC,,, | Performed by: FAMILY MEDICINE

## 2018-09-18 PROCEDURE — 3078F DIAST BP <80 MM HG: CPT | Mod: CPTII,S$GLB,, | Performed by: FAMILY MEDICINE

## 2018-09-18 PROCEDURE — 3074F SYST BP LT 130 MM HG: CPT | Mod: CPTII,S$GLB,, | Performed by: FAMILY MEDICINE

## 2018-09-18 PROCEDURE — 3008F BODY MASS INDEX DOCD: CPT | Mod: CPTII,S$GLB,, | Performed by: FAMILY MEDICINE

## 2018-09-18 PROCEDURE — 99213 OFFICE O/P EST LOW 20 MIN: CPT | Mod: S$GLB,,, | Performed by: FAMILY MEDICINE

## 2018-09-18 RX ORDER — IPRATROPIUM BROMIDE 42 UG/1
2 SPRAY, METERED NASAL 4 TIMES DAILY
Qty: 15 ML | Refills: 5 | Status: SHIPPED | OUTPATIENT
Start: 2018-09-18 | End: 2019-03-08

## 2018-09-18 RX ORDER — COLCHICINE 0.6 MG/1
0.6 TABLET ORAL DAILY PRN
Qty: 30 TABLET | Refills: 5 | Status: CANCELLED | OUTPATIENT
Start: 2018-09-18 | End: 2019-09-18

## 2018-09-18 RX ORDER — ALLOPURINOL 100 MG/1
100 TABLET ORAL DAILY
Qty: 30 TABLET | Refills: 5 | Status: SHIPPED | OUTPATIENT
Start: 2018-09-18 | End: 2018-10-18

## 2018-09-18 RX ORDER — COLCHICINE 0.6 MG/1
0.6 TABLET ORAL DAILY PRN
Qty: 30 TABLET | Refills: 5 | Status: SHIPPED | OUTPATIENT
Start: 2018-09-18 | End: 2020-01-24 | Stop reason: SDUPTHER

## 2018-09-18 NOTE — TELEPHONE ENCOUNTER
----- Message from Roman Barker sent at 2018 11:36 AM CDT -----  Contact: Wife - Sarah Hand  MRN: [id number]  : 1955  PCP: Jaison Tesfaye  Home Phone      [phone]  Work Phone      Not on file.  Mobile          [phone]      MESSAGE: Rx for Colchicine not received by pharmacy -- send to [facility]     Call [phone]    PCP: Mera

## 2018-09-18 NOTE — PROGRESS NOTES
Subjective:       Patient ID: Shawn Hand is a 62 y.o. male.    Chief Complaint: Follow-up (1 mo); Nasal Congestion; and Cough    Pt is a 62 y.o. male who presents for evaluation and management of   Encounter Diagnoses   Name Primary?    Gout, arthritis Yes    Congestion of nasal sinus    .  Doing well on current meds. Denies any side effects. Prevention is up to date.    Review of Systems    Objective:      Physical Exam   Constitutional: He is oriented to person, place, and time. He appears well-developed and well-nourished.   HENT:   Head: Normocephalic and atraumatic.   Right Ear: External ear normal.   Left Ear: External ear normal.   Nose: Nose normal.   Mouth/Throat: Oropharynx is clear and moist.   Congested      Eyes: Conjunctivae and EOM are normal. Pupils are equal, round, and reactive to light. Right eye exhibits no discharge. Left eye exhibits no discharge. No scleral icterus.   Neck: Normal range of motion. Neck supple. No JVD present. No tracheal deviation present. No thyromegaly present.   Cardiovascular: Normal rate, regular rhythm, normal heart sounds and intact distal pulses.   No murmur heard.  Pulmonary/Chest: Effort normal and breath sounds normal. No respiratory distress. He has no wheezes. He has no rales. He exhibits no tenderness.   Abdominal: Soft. Bowel sounds are normal. He exhibits no distension and no mass. There is no tenderness. There is no rebound and no guarding.   Musculoskeletal: Normal range of motion.   Lymphadenopathy:     He has no cervical adenopathy.   Neurological: He is alert and oriented to person, place, and time. He has normal reflexes. He displays normal reflexes. No cranial nerve deficit. He exhibits normal muscle tone. Coordination normal.   Skin: Skin is warm and dry.   Psychiatric: He has a normal mood and affect. His behavior is normal. Judgment and thought content normal.       Assessment:       1. Gout, arthritis    2. Congestion of nasal sinus         Plan:   Shawn HANNAH Sr was seen today for follow-up, nasal congestion and cough.    Diagnoses and all orders for this visit:    Gout, arthritis  -     allopurinol (ZYLOPRIM) 100 MG tablet; Take 1 tablet (100 mg total) by mouth once daily.  -     colchicine (COLCRYS) 0.6 mg tablet; Take 1 tablet (0.6 mg total) by mouth daily as needed (gout pain).    Congestion of nasal sinus  -     ipratropium (ATROVENT) 42 mcg (0.06 %) nasal spray; 2 sprays by Nasal route 4 (four) times daily.      Problem List Items Addressed This Visit     None      Visit Diagnoses     Gout, arthritis    -  Primary    Relevant Medications    allopurinol (ZYLOPRIM) 100 MG tablet    colchicine (COLCRYS) 0.6 mg tablet    Congestion of nasal sinus        Relevant Medications    ipratropium (ATROVENT) 42 mcg (0.06 %) nasal spray        No Follow-up on file.

## 2018-10-01 RX ORDER — BUDESONIDE AND FORMOTEROL FUMARATE DIHYDRATE 160; 4.5 UG/1; UG/1
AEROSOL RESPIRATORY (INHALATION)
Qty: 10.2 G | Refills: 3 | Status: SHIPPED | OUTPATIENT
Start: 2018-10-01 | End: 2019-01-17 | Stop reason: SDUPTHER

## 2018-10-25 ENCOUNTER — OFFICE VISIT (OUTPATIENT)
Dept: FAMILY MEDICINE | Facility: CLINIC | Age: 63
End: 2018-10-25
Payer: COMMERCIAL

## 2018-10-25 VITALS
HEART RATE: 88 BPM | SYSTOLIC BLOOD PRESSURE: 100 MMHG | DIASTOLIC BLOOD PRESSURE: 80 MMHG | BODY MASS INDEX: 40.04 KG/M2 | RESPIRATION RATE: 20 BRPM | WEIGHT: 286 LBS | HEIGHT: 71 IN | OXYGEN SATURATION: 98 %

## 2018-10-25 PROCEDURE — 3008F BODY MASS INDEX DOCD: CPT | Mod: CPTII,S$GLB,, | Performed by: FAMILY MEDICINE

## 2018-10-25 PROCEDURE — 3074F SYST BP LT 130 MM HG: CPT | Mod: CPTII,S$GLB,, | Performed by: FAMILY MEDICINE

## 2018-10-25 PROCEDURE — 99212 OFFICE O/P EST SF 10 MIN: CPT | Mod: S$GLB,,, | Performed by: FAMILY MEDICINE

## 2018-10-25 PROCEDURE — 99999 PR PBB SHADOW E&M-EST. PATIENT-LVL IV: CPT | Mod: PBBFAC,,, | Performed by: FAMILY MEDICINE

## 2018-10-25 PROCEDURE — 3079F DIAST BP 80-89 MM HG: CPT | Mod: CPTII,S$GLB,, | Performed by: FAMILY MEDICINE

## 2018-10-25 RX ORDER — ALLOPURINOL 100 MG/1
TABLET ORAL
Refills: 5 | COMMUNITY
Start: 2018-10-19 | End: 2019-03-25 | Stop reason: SDUPTHER

## 2018-10-25 RX ORDER — DILTIAZEM HYDROCHLORIDE 240 MG/1
CAPSULE, EXTENDED RELEASE ORAL
COMMUNITY
Start: 2018-09-07 | End: 2020-12-07 | Stop reason: ALTCHOICE

## 2018-10-25 NOTE — PROGRESS NOTES
Subjective:       Patient ID: Shawn Hand is a 62 y.o. male.    Chief Complaint: paper work    Pt is a 62 y.o. male who presents for evaluation and management of   Encounter Diagnosis   Name Primary?    Medical history reviewed with no changes Yes   .  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 Denies any side effects. Prevention is up to date.  Review of Systems   Respiratory: Positive for shortness of breath.    Cardiovascular: Negative for chest pain.   Musculoskeletal: Positive for arthralgias, gait problem and joint swelling.       Objective:      Physical Exam   Constitutional: He is oriented to person, place, and time. He appears well-developed and well-nourished.   HENT:   Head: Normocephalic and atraumatic.   Right Ear: External ear normal.   Left Ear: External ear normal.   Nose: Nose normal.   Mouth/Throat: Oropharynx is clear and moist.   Congested      Eyes: Conjunctivae and EOM are normal. Pupils are equal, round, and reactive to light. Right eye exhibits no discharge. Left eye exhibits no discharge. No scleral icterus.   Neck: Normal range of motion. Neck supple. No JVD present. No tracheal deviation present. No thyromegaly present.   Cardiovascular: Normal rate, regular rhythm, normal heart sounds and intact distal pulses.   No murmur heard.  Pulmonary/Chest: Effort normal and breath sounds normal. No respiratory distress. He has no wheezes. He has no rales. He exhibits no tenderness.   Abdominal: Soft. Bowel sounds are normal. He exhibits no distension and no mass. There is no tenderness. There is no rebound and no guarding.   Musculoskeletal: Normal range of motion.   Lymphadenopathy:     He has no cervical adenopathy.   Neurological: He is alert and oriented to person, place, and time. He has normal reflexes. He displays normal reflexes. No cranial nerve deficit. He exhibits normal muscle tone. Coordination normal.   Skin: Skin is  warm and dry.   Psychiatric: He has a normal mood and affect. His behavior is normal. Judgment and thought content normal.       Assessment:       1. Medical history reviewed with no changes        Plan:   Shawn HANNAH Sr was seen today for paper work.    Diagnoses and all orders for this visit:    Medical history reviewed with no changes      Problem List Items Addressed This Visit     None      Visit Diagnoses     Medical history reviewed with no changes    -  Primary        Appropriate paperwork filled out   No Follow-up on file.

## 2019-01-18 RX ORDER — BUDESONIDE AND FORMOTEROL FUMARATE DIHYDRATE 160; 4.5 UG/1; UG/1
AEROSOL RESPIRATORY (INHALATION)
Qty: 10.2 G | Refills: 3 | Status: SHIPPED | OUTPATIENT
Start: 2019-01-18 | End: 2020-10-08 | Stop reason: SDUPTHER

## 2019-03-08 ENCOUNTER — OFFICE VISIT (OUTPATIENT)
Dept: FAMILY MEDICINE | Facility: CLINIC | Age: 64
End: 2019-03-08
Payer: COMMERCIAL

## 2019-03-08 VITALS
DIASTOLIC BLOOD PRESSURE: 88 MMHG | WEIGHT: 295.44 LBS | BODY MASS INDEX: 41.2 KG/M2 | SYSTOLIC BLOOD PRESSURE: 122 MMHG | HEART RATE: 92 BPM | RESPIRATION RATE: 16 BRPM

## 2019-03-08 DIAGNOSIS — R10.32 LEFT INGUINAL PAIN: ICD-10-CM

## 2019-03-08 DIAGNOSIS — R10.9 LEFT FLANK PAIN: Primary | ICD-10-CM

## 2019-03-08 PROCEDURE — 3008F BODY MASS INDEX DOCD: CPT | Mod: CPTII,S$GLB,, | Performed by: FAMILY MEDICINE

## 2019-03-08 PROCEDURE — 3008F PR BODY MASS INDEX (BMI) DOCUMENTED: ICD-10-PCS | Mod: CPTII,S$GLB,, | Performed by: FAMILY MEDICINE

## 2019-03-08 PROCEDURE — 3079F DIAST BP 80-89 MM HG: CPT | Mod: CPTII,S$GLB,, | Performed by: FAMILY MEDICINE

## 2019-03-08 PROCEDURE — 3074F PR MOST RECENT SYSTOLIC BLOOD PRESSURE < 130 MM HG: ICD-10-PCS | Mod: CPTII,S$GLB,, | Performed by: FAMILY MEDICINE

## 2019-03-08 PROCEDURE — 99999 PR PBB SHADOW E&M-EST. PATIENT-LVL III: ICD-10-PCS | Mod: PBBFAC,,, | Performed by: FAMILY MEDICINE

## 2019-03-08 PROCEDURE — 99999 PR PBB SHADOW E&M-EST. PATIENT-LVL III: CPT | Mod: PBBFAC,,, | Performed by: FAMILY MEDICINE

## 2019-03-08 PROCEDURE — 99213 OFFICE O/P EST LOW 20 MIN: CPT | Mod: S$GLB,,, | Performed by: FAMILY MEDICINE

## 2019-03-08 PROCEDURE — 3074F SYST BP LT 130 MM HG: CPT | Mod: CPTII,S$GLB,, | Performed by: FAMILY MEDICINE

## 2019-03-08 PROCEDURE — 3079F PR MOST RECENT DIASTOLIC BLOOD PRESSURE 80-89 MM HG: ICD-10-PCS | Mod: CPTII,S$GLB,, | Performed by: FAMILY MEDICINE

## 2019-03-08 PROCEDURE — 99213 PR OFFICE/OUTPT VISIT, EST, LEVL III, 20-29 MIN: ICD-10-PCS | Mod: S$GLB,,, | Performed by: FAMILY MEDICINE

## 2019-03-08 NOTE — PROGRESS NOTES
Subjective:       Patient ID: Shawn Hand is a 63 y.o. male.    Chief Complaint: Back Pain (radiating down into the groin that comes and goes.) and Polyuria    Pt is a 63 y.o. male who presents for evaluation and management of   Encounter Diagnosis   Name Primary?    Left flank pain Yes   .    Doing well on current meds. Denies any side effects. Prevention is up to date.    Review of Systems   Constitutional: Negative for chills and fever.   Gastrointestinal: Negative for blood in stool, constipation and diarrhea.   Genitourinary: Positive for flank pain and testicular pain. Negative for discharge and dysuria.       Objective:      Physical Exam   Constitutional: He is oriented to person, place, and time. He appears well-developed and well-nourished.   HENT:   Head: Normocephalic and atraumatic.   Right Ear: External ear normal.   Left Ear: External ear normal.   Nose: Nose normal.   Mouth/Throat: Oropharynx is clear and moist.   Eyes: Conjunctivae and EOM are normal. Pupils are equal, round, and reactive to light. Right eye exhibits no discharge. Left eye exhibits no discharge. No scleral icterus.   Neck: Normal range of motion. Neck supple. No JVD present. No tracheal deviation present. No thyromegaly present.   Cardiovascular: Normal rate, regular rhythm, normal heart sounds and intact distal pulses.   No murmur heard.  Pulmonary/Chest: Effort normal and breath sounds normal. No respiratory distress. He has no wheezes. He has no rales. He exhibits no tenderness.   Abdominal: Soft. Bowel sounds are normal. He exhibits no distension and no mass. There is no tenderness. There is no rebound and no guarding.   Genitourinary:   Genitourinary Comments: No inguinal hernia   No epididymitis        Musculoskeletal: Normal range of motion.   Lymphadenopathy:     He has no cervical adenopathy.   Neurological: He is alert and oriented to person, place, and time. He has normal reflexes. He displays normal reflexes. No  cranial nerve deficit. He exhibits normal muscle tone. Coordination normal.   Skin: Skin is warm and dry.   Psychiatric: He has a normal mood and affect. His behavior is normal. Judgment and thought content normal.       Assessment:       1. Left flank pain    2. Left inguinal pain        Plan:   Shawn HANNAH Sr was seen today for back pain and polyuria.    Diagnoses and all orders for this visit:    Left flank pain  -     POCT URINE DIPSTICK WITH MICROSCOPE, AUTOMATED  -     POCT Urine Sediment Exam    Left inguinal pain      Problem List Items Addressed This Visit     None      Visit Diagnoses     Left flank pain    -  Primary    Relevant Orders    POCT URINE DIPSTICK WITH MICROSCOPE, AUTOMATED    POCT Urine Sediment Exam    Left inguinal pain            Resolved. 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

## 2019-03-25 RX ORDER — ALLOPURINOL 100 MG/1
TABLET ORAL
Qty: 30 TABLET | Refills: 5 | Status: SHIPPED | OUTPATIENT
Start: 2019-03-25 | End: 2019-10-04 | Stop reason: SDUPTHER

## 2019-05-26 RX ORDER — BUDESONIDE AND FORMOTEROL FUMARATE DIHYDRATE 160; 4.5 UG/1; UG/1
AEROSOL RESPIRATORY (INHALATION)
Qty: 10.2 G | Refills: 3 | Status: SHIPPED | OUTPATIENT
Start: 2019-05-26 | End: 2020-12-07 | Stop reason: ALTCHOICE

## 2019-07-01 LAB
CHOLEST/HDLC SERPL: 4 {RATIO}
CHOLESTEROL, TOTAL: 143
HDLC SERPL-MCNC: 36 MG/DL (ref 35–70)
LDL CHOLESTEROL DIRECT: 104 MG/DL
NON HDL CHOL. (LDL+VLDL): 107
TRIGL SERPL-MCNC: 90 MG/DL
VLDL CHOLESTEROL: 18 MG/DL

## 2019-07-23 RX ORDER — MONTELUKAST SODIUM 10 MG/1
TABLET ORAL
Qty: 30 TABLET | Refills: 10 | Status: SHIPPED | OUTPATIENT
Start: 2019-07-23 | End: 2020-06-16

## 2019-07-31 ENCOUNTER — TELEPHONE (OUTPATIENT)
Dept: ADMINISTRATIVE | Facility: HOSPITAL | Age: 64
End: 2019-07-31

## 2019-09-22 RX ORDER — BUDESONIDE AND FORMOTEROL FUMARATE DIHYDRATE 160; 4.5 UG/1; UG/1
AEROSOL RESPIRATORY (INHALATION)
Qty: 10.2 INHALER | Refills: 1 | Status: SHIPPED | OUTPATIENT
Start: 2019-09-22 | End: 2019-11-17 | Stop reason: SDUPTHER

## 2019-10-06 RX ORDER — ALLOPURINOL 100 MG/1
TABLET ORAL
Qty: 30 TABLET | Refills: 5 | Status: SHIPPED | OUTPATIENT
Start: 2019-10-06 | End: 2020-04-06

## 2019-10-23 ENCOUNTER — OFFICE VISIT (OUTPATIENT)
Dept: FAMILY MEDICINE | Facility: CLINIC | Age: 64
End: 2019-10-23
Payer: COMMERCIAL

## 2019-10-23 VITALS
RESPIRATION RATE: 16 BRPM | BODY MASS INDEX: 41.14 KG/M2 | HEART RATE: 86 BPM | HEIGHT: 71 IN | SYSTOLIC BLOOD PRESSURE: 110 MMHG | DIASTOLIC BLOOD PRESSURE: 70 MMHG | WEIGHT: 293.88 LBS

## 2019-10-23 DIAGNOSIS — Z02.89 EXAMINATION, PHYSICAL, EMPLOYEE: Primary | ICD-10-CM

## 2019-10-23 PROCEDURE — 3074F SYST BP LT 130 MM HG: CPT | Mod: CPTII,S$GLB,, | Performed by: FAMILY MEDICINE

## 2019-10-23 PROCEDURE — 99212 OFFICE O/P EST SF 10 MIN: CPT | Mod: S$GLB,,, | Performed by: FAMILY MEDICINE

## 2019-10-23 PROCEDURE — 3078F PR MOST RECENT DIASTOLIC BLOOD PRESSURE < 80 MM HG: ICD-10-PCS | Mod: CPTII,S$GLB,, | Performed by: FAMILY MEDICINE

## 2019-10-23 PROCEDURE — 3074F PR MOST RECENT SYSTOLIC BLOOD PRESSURE < 130 MM HG: ICD-10-PCS | Mod: CPTII,S$GLB,, | Performed by: FAMILY MEDICINE

## 2019-10-23 PROCEDURE — 3008F PR BODY MASS INDEX (BMI) DOCUMENTED: ICD-10-PCS | Mod: CPTII,S$GLB,, | Performed by: FAMILY MEDICINE

## 2019-10-23 PROCEDURE — 3008F BODY MASS INDEX DOCD: CPT | Mod: CPTII,S$GLB,, | Performed by: FAMILY MEDICINE

## 2019-10-23 PROCEDURE — 99212 PR OFFICE/OUTPT VISIT, EST, LEVL II, 10-19 MIN: ICD-10-PCS | Mod: S$GLB,,, | Performed by: FAMILY MEDICINE

## 2019-10-23 PROCEDURE — 3078F DIAST BP <80 MM HG: CPT | Mod: CPTII,S$GLB,, | Performed by: FAMILY MEDICINE

## 2019-10-23 PROCEDURE — 99999 PR PBB SHADOW E&M-EST. PATIENT-LVL IV: CPT | Mod: PBBFAC,,, | Performed by: FAMILY MEDICINE

## 2019-10-23 PROCEDURE — 99999 PR PBB SHADOW E&M-EST. PATIENT-LVL IV: ICD-10-PCS | Mod: PBBFAC,,, | Performed by: FAMILY MEDICINE

## 2019-10-23 RX ORDER — OMEPRAZOLE 20 MG/1
20 CAPSULE, DELAYED RELEASE ORAL DAILY
COMMUNITY
End: 2021-05-25

## 2019-10-23 RX ORDER — SILDENAFIL 100 MG/1
TABLET, FILM COATED ORAL
Refills: 3 | COMMUNITY
Start: 2019-07-27

## 2019-10-23 NOTE — PROGRESS NOTES
Subjective:       Patient ID: Shawn Hand is a 63 y.o. male.    Chief Complaint: Follow-up (re-cert for [facility] dept)    Pt is a 63 y.o. male who presents for evaluation and management of   Encounter Diagnosis   Name Primary?    Examination, physical, employee Yes   .  Doing well on current meds. Denies any side effects. Prevention is up to date.  Review of Systems   Constitutional: Negative for fever.   Respiratory: Negative for shortness of breath.    Cardiovascular: Negative for chest pain.   Gastrointestinal: Negative for anal bleeding and blood in stool.   Genitourinary: Negative for dysuria.   Neurological: Negative for dizziness and light-headedness.       Objective:      Physical Exam   Constitutional: He is oriented to person, place, and time. He appears well-developed and well-nourished.   HENT:   Head: Normocephalic and atraumatic.   Right Ear: External ear normal.   Left Ear: External ear normal.   Nose: Nose normal.   Mouth/Throat: Oropharynx is clear and moist.   Eyes: Pupils are equal, round, and reactive to light. Conjunctivae and EOM are normal. Right eye exhibits no discharge. Left eye exhibits no discharge. No scleral icterus.   Neck: Normal range of motion. Neck supple. No JVD present. No tracheal deviation present. No thyromegaly present.   Cardiovascular: Normal rate, regular rhythm, normal heart sounds and intact distal pulses.   No murmur heard.  Pulmonary/Chest: Effort normal and breath sounds normal. No respiratory distress. He has no wheezes. He has no rales. He exhibits no tenderness.   Abdominal: Soft. Bowel sounds are normal. He exhibits no distension and no mass. There is no tenderness. There is no rebound and no guarding.   Musculoskeletal: Normal range of motion.   Lymphadenopathy:     He has no cervical adenopathy.   Neurological: He is alert and oriented to person, place, and time. He has normal reflexes. He displays normal reflexes. No cranial nerve deficit. He exhibits  normal muscle tone. Coordination normal.   Skin: Skin is warm and dry.   Psychiatric: He has a normal mood and affect. His behavior is normal. Judgment and thought content normal.       Assessment:       1. Examination, physical, employee        Plan:   Shawn HANNAH Sr was seen today for follow-up.    Diagnoses and all orders for this visit:    Examination, physical, employee      Problem List Items Addressed This Visit     None      Visit Diagnoses     Examination, physical, employee    -  Primary        No follow-ups on file.

## 2019-11-17 RX ORDER — BUDESONIDE AND FORMOTEROL FUMARATE DIHYDRATE 160; 4.5 UG/1; UG/1
AEROSOL RESPIRATORY (INHALATION)
Qty: 10.2 INHALER | Refills: 1 | Status: SHIPPED | OUTPATIENT
Start: 2019-11-17 | End: 2020-01-21

## 2019-12-18 ENCOUNTER — TELEPHONE (OUTPATIENT)
Dept: OTHER | Facility: OTHER | Age: 64
End: 2019-12-18

## 2019-12-18 ENCOUNTER — DOCUMENTATION ONLY (OUTPATIENT)
Dept: OTHER | Facility: OTHER | Age: 64
End: 2019-12-18

## 2019-12-18 NOTE — PROGRESS NOTES
12/18/2019  1010    Called and LM for pt who was No Show for 1000 Virtual Visit.  He has an [facility] PCP so if he returns my call, I [facility] place [facility].    Minoo Severino, MPH, PA-C  [facility]  [phone]

## 2019-12-30 ENCOUNTER — OFFICE VISIT (OUTPATIENT)
Dept: FAMILY MEDICINE | Facility: CLINIC | Age: 64
End: 2019-12-30
Payer: COMMERCIAL

## 2019-12-30 VITALS
BODY MASS INDEX: 42.68 KG/M2 | DIASTOLIC BLOOD PRESSURE: 80 MMHG | SYSTOLIC BLOOD PRESSURE: 120 MMHG | WEIGHT: 304.88 LBS | RESPIRATION RATE: 16 BRPM | HEIGHT: 71 IN | HEART RATE: 82 BPM

## 2019-12-30 DIAGNOSIS — R05.9 COUGH: ICD-10-CM

## 2019-12-30 DIAGNOSIS — J02.9 SORE THROAT: Primary | ICD-10-CM

## 2019-12-30 PROCEDURE — 3079F DIAST BP 80-89 MM HG: CPT | Mod: CPTII,S$GLB,, | Performed by: FAMILY MEDICINE

## 2019-12-30 PROCEDURE — 3074F PR MOST RECENT SYSTOLIC BLOOD PRESSURE < 130 MM HG: ICD-10-PCS | Mod: CPTII,S$GLB,, | Performed by: FAMILY MEDICINE

## 2019-12-30 PROCEDURE — 3079F PR MOST RECENT DIASTOLIC BLOOD PRESSURE 80-89 MM HG: ICD-10-PCS | Mod: CPTII,S$GLB,, | Performed by: FAMILY MEDICINE

## 2019-12-30 PROCEDURE — 99213 PR OFFICE/OUTPT VISIT, EST, LEVL III, 20-29 MIN: ICD-10-PCS | Mod: S$GLB,,, | Performed by: FAMILY MEDICINE

## 2019-12-30 PROCEDURE — 99213 OFFICE O/P EST LOW 20 MIN: CPT | Mod: S$GLB,,, | Performed by: FAMILY MEDICINE

## 2019-12-30 PROCEDURE — 3008F BODY MASS INDEX DOCD: CPT | Mod: CPTII,S$GLB,, | Performed by: FAMILY MEDICINE

## 2019-12-30 PROCEDURE — 99999 PR PBB SHADOW E&M-EST. PATIENT-LVL III: CPT | Mod: PBBFAC,,, | Performed by: FAMILY MEDICINE

## 2019-12-30 PROCEDURE — 3008F PR BODY MASS INDEX (BMI) DOCUMENTED: ICD-10-PCS | Mod: CPTII,S$GLB,, | Performed by: FAMILY MEDICINE

## 2019-12-30 PROCEDURE — 3074F SYST BP LT 130 MM HG: CPT | Mod: CPTII,S$GLB,, | Performed by: FAMILY MEDICINE

## 2019-12-30 PROCEDURE — 99999 PR PBB SHADOW E&M-EST. PATIENT-LVL III: ICD-10-PCS | Mod: PBBFAC,,, | Performed by: FAMILY MEDICINE

## 2019-12-30 RX ORDER — PROMETHAZINE HYDROCHLORIDE AND CODEINE PHOSPHATE 6.25; 1 MG/5ML; MG/5ML
5 SOLUTION ORAL NIGHTLY PRN
Qty: 118 ML | Refills: 0 | Status: SHIPPED | OUTPATIENT
Start: 2019-12-30 | End: 2020-01-09

## 2019-12-30 NOTE — PROGRESS NOTES
Subjective:       Patient ID: Shawn Hand is a 64 y.o. male.    Chief Complaint: Sore Throat    Pt is a 64 y.o. male who presents for evaluation and management of   Encounter Diagnoses   Name Primary?    Sore throat Yes    Cough    .  Doing well on current meds. Denies any side effects. Prevention is up to date.    Review of Systems   Constitutional: Negative for fever.   HENT: Positive for sore throat.    Respiratory: Positive for cough. Negative for shortness of breath.    Cardiovascular: Negative for chest pain.   Gastrointestinal: Negative for anal bleeding and blood in stool.   Genitourinary: Negative for dysuria.   Neurological: Negative for dizziness and light-headedness.       Objective:      Physical Exam   Constitutional: He is oriented to person, place, and time. He appears well-developed and well-nourished.   HENT:   Head: Normocephalic and atraumatic.   Right Ear: External ear normal.   Left Ear: External ear normal.   Nose: Nose normal.   Mouth/Throat: Oropharynx is clear and moist.   Eyes: Pupils are equal, round, and reactive to light. Conjunctivae and EOM are normal. Right eye exhibits no discharge. Left eye exhibits no discharge. No scleral icterus.   Neck: Normal range of motion. Neck supple. No JVD present. No tracheal deviation present. No thyromegaly present.   Cardiovascular: Normal rate, regular rhythm, normal heart sounds and intact distal pulses.   No murmur heard.  Pulmonary/Chest: Effort normal and breath sounds normal. No respiratory distress. He has no wheezes. He has no rales. He exhibits no tenderness.   Abdominal: Soft. Bowel sounds are normal. He exhibits no distension and no mass. There is no tenderness. There is no rebound and no guarding.   Musculoskeletal: Normal range of motion.   Lymphadenopathy:     He has no cervical adenopathy.   Neurological: He is alert and oriented to person, place, and time. He has normal reflexes. He displays normal reflexes. No cranial nerve  deficit. He exhibits normal muscle tone. Coordination normal.   Skin: Skin is warm and dry.   Psychiatric: He has a normal mood and affect. His behavior is normal. Judgment and thought content normal.       Assessment:       1. Sore throat    2. Cough        Plan:   Shawn HANNAH Sr was seen today for sore throat.    Diagnoses and all orders for this visit:    Sore throat    Cough  -     promethazine-codeine 6.25-10 mg/5 ml (PHENERGAN WITH CODEINE) 6.25-10 mg/5 mL syrup; Take 5 mLs by mouth nightly as needed for Cough.      Problem List Items Addressed This Visit     None      Visit Diagnoses     Sore throat    -  Primary    Cough            No follow-ups on file.

## 2020-01-21 RX ORDER — BUDESONIDE AND FORMOTEROL FUMARATE DIHYDRATE 160; 4.5 UG/1; UG/1
AEROSOL RESPIRATORY (INHALATION)
Qty: 10.2 INHALER | Refills: 1 | Status: SHIPPED | OUTPATIENT
Start: 2020-01-21 | End: 2020-03-15

## 2020-01-24 DIAGNOSIS — M10.9 GOUT, ARTHRITIS: ICD-10-CM

## 2020-01-24 NOTE — TELEPHONE ENCOUNTER
----- Message from Ange Singh sent at 2020 10:07 AM CST -----  Contact: self  Shawn Hand  MRN: [id number]  : 1955  PCP: Jaison Tesfaye  Home Phone      [phone]  Work Phone      Not on file.  Mobile          [phone]      MESSAGE:   Pt requesting refill or new Rx.   Is this a refill or new RX:  refill  RX name and strength: colchicine (COLCRYS) 0.6 mg tablet   Last office visit: 19  Is this a 30-day or 90-day RX:  30  [facility] [place]  Comments:      Phone:  [phone]

## 2020-01-25 RX ORDER — COLCHICINE 0.6 MG/1
0.6 TABLET ORAL DAILY PRN
Qty: 30 TABLET | Refills: 5 | Status: SHIPPED | OUTPATIENT
Start: 2020-01-25 | End: 2022-03-14

## 2020-01-27 ENCOUNTER — TELEPHONE (OUTPATIENT)
Dept: FAMILY MEDICINE | Facility: CLINIC | Age: 65
End: 2020-01-27

## 2020-01-27 NOTE — TELEPHONE ENCOUNTER
----- Message from Tracy Zabala sent at 2020  2:47 PM CST -----  Contact: Fax  Shawn Hand  MRN: [id number]  : 1955  PCP: Jaison Tesfaye  Home Phone      [phone]  Work Phone      Not on file.  Mobile          [phone]      MESSAGE:   Pt requesting refill or new Rx.   Is this a refill or new RX:  New  RX name and strength: colchicine (COLCRYS) 0.6 mg tablet  Last office visit: 19  Is this a 30-day or 90-day RX:  30  Pharmacy name and location: [facility] in [place]   Comments:      Phone:  [phone]

## 2020-03-11 RX ORDER — ALBUTEROL SULFATE 0.83 MG/ML
2.5 SOLUTION RESPIRATORY (INHALATION) EVERY 6 HOURS PRN
Qty: 1 BOX | Refills: 5 | Status: SHIPPED | OUTPATIENT
Start: 2020-03-11 | End: 2021-03-11

## 2020-03-15 RX ORDER — BUDESONIDE AND FORMOTEROL FUMARATE DIHYDRATE 160; 4.5 UG/1; UG/1
AEROSOL RESPIRATORY (INHALATION)
Qty: 10.2 INHALER | Refills: 1 | Status: SHIPPED | OUTPATIENT
Start: 2020-03-15 | End: 2020-10-16

## 2020-04-06 RX ORDER — ALLOPURINOL 100 MG/1
TABLET ORAL
Qty: 90 TABLET | Refills: 1 | Status: SHIPPED | OUTPATIENT
Start: 2020-04-06 | End: 2020-10-11

## 2020-06-02 ENCOUNTER — TELEPHONE (OUTPATIENT)
Dept: FAMILY MEDICINE | Facility: CLINIC | Age: 65
End: 2020-06-02

## 2020-06-02 NOTE — TELEPHONE ENCOUNTER
----- Message from Ange Singh sent at 2020  8:23 AM CDT -----  Contact: wife- phyllis Hand  MRN: [id number]  : 1955  PCP: Jaison Tesfaye  Home Phone      [phone]  Work Phone      Not on file.  Mobile          [phone]      MESSAGE:   Patient wife would like to talk to María, she said it was personal to the patient and would not tell me what is going on.     [phone]

## 2020-06-02 NOTE — TELEPHONE ENCOUNTER
Spoke to patients spouse, was wondering if there was a preventative patient could take to prevent Covid 19. Informed patients spouse no preventative noted. Instructed to wear mask and wash hands regularly for 20 seconds. Instructed to call back if she has any further questions.

## 2020-08-04 ENCOUNTER — OFFICE VISIT (OUTPATIENT)
Dept: FAMILY MEDICINE | Facility: CLINIC | Age: 65
End: 2020-08-04
Payer: COMMERCIAL

## 2020-08-04 VITALS
HEIGHT: 71 IN | BODY MASS INDEX: 43.12 KG/M2 | DIASTOLIC BLOOD PRESSURE: 80 MMHG | WEIGHT: 308 LBS | SYSTOLIC BLOOD PRESSURE: 128 MMHG | TEMPERATURE: 97 F | HEART RATE: 86 BPM | RESPIRATION RATE: 16 BRPM

## 2020-08-04 DIAGNOSIS — Z12.5 SCREENING FOR PROSTATE CANCER: ICD-10-CM

## 2020-08-04 DIAGNOSIS — M10.9 GOUT, ARTHRITIS: Primary | ICD-10-CM

## 2020-08-04 LAB — COMPLEXED PSA SERPL-MCNC: 0.84 NG/ML (ref 0–4)

## 2020-08-04 PROCEDURE — 99999 PR PBB SHADOW E&M-EST. PATIENT-LVL IV: CPT | Mod: PBBFAC,,, | Performed by: FAMILY MEDICINE

## 2020-08-04 PROCEDURE — 3079F DIAST BP 80-89 MM HG: CPT | Mod: CPTII,S$GLB,, | Performed by: FAMILY MEDICINE

## 2020-08-04 PROCEDURE — 99213 OFFICE O/P EST LOW 20 MIN: CPT | Mod: S$GLB,,, | Performed by: FAMILY MEDICINE

## 2020-08-04 PROCEDURE — 3074F PR MOST RECENT SYSTOLIC BLOOD PRESSURE < 130 MM HG: ICD-10-PCS | Mod: CPTII,S$GLB,, | Performed by: FAMILY MEDICINE

## 2020-08-04 PROCEDURE — 3008F PR BODY MASS INDEX (BMI) DOCUMENTED: ICD-10-PCS | Mod: CPTII,S$GLB,, | Performed by: FAMILY MEDICINE

## 2020-08-04 PROCEDURE — 3079F PR MOST RECENT DIASTOLIC BLOOD PRESSURE 80-89 MM HG: ICD-10-PCS | Mod: CPTII,S$GLB,, | Performed by: FAMILY MEDICINE

## 2020-08-04 PROCEDURE — 99999 PR PBB SHADOW E&M-EST. PATIENT-LVL IV: ICD-10-PCS | Mod: PBBFAC,,, | Performed by: FAMILY MEDICINE

## 2020-08-04 PROCEDURE — 99213 PR OFFICE/OUTPT VISIT, EST, LEVL III, 20-29 MIN: ICD-10-PCS | Mod: S$GLB,,, | Performed by: FAMILY MEDICINE

## 2020-08-04 PROCEDURE — 3008F BODY MASS INDEX DOCD: CPT | Mod: CPTII,S$GLB,, | Performed by: FAMILY MEDICINE

## 2020-08-04 PROCEDURE — 36415 PR COLLECTION VENOUS BLOOD,VENIPUNCTURE: ICD-10-PCS | Mod: S$GLB,,, | Performed by: FAMILY MEDICINE

## 2020-08-04 PROCEDURE — 3074F SYST BP LT 130 MM HG: CPT | Mod: CPTII,S$GLB,, | Performed by: FAMILY MEDICINE

## 2020-08-04 PROCEDURE — 36415 COLL VENOUS BLD VENIPUNCTURE: CPT | Mod: S$GLB,,, | Performed by: FAMILY MEDICINE

## 2020-08-04 PROCEDURE — 84153 ASSAY OF PSA TOTAL: CPT

## 2020-08-04 NOTE — PROGRESS NOTES
Subjective:       Patient ID: Shawn Hand is a 64 y.o. male.    Chief Complaint: Gout (right foot)    Pt is a 64 y.o. male who presents for evaluation and management of   Encounter Diagnoses   Name Primary?    Gout, arthritis Yes    Screening for prostate cancer    .  Doing well on current meds. Denies any side effects. Prevention is up to date.  Review of Systems   Musculoskeletal: Positive for arthralgias and joint swelling.       Objective:      Physical Exam  Constitutional:       Appearance: He is well-developed.   HENT:      Head: Normocephalic and atraumatic.      Right Ear: External ear normal.      Left Ear: External ear normal.      Nose: Nose normal.   Eyes:      General: No scleral icterus.        Right eye: No discharge.         Left eye: No discharge.      Conjunctiva/sclera: Conjunctivae normal.      Pupils: Pupils are equal, round, and reactive to light.   Neck:      Musculoskeletal: Normal range of motion and neck supple.      Thyroid: No thyromegaly.      Vascular: No JVD.      Trachea: No tracheal deviation.   Cardiovascular:      Rate and Rhythm: Normal rate and regular rhythm.      Heart sounds: Normal heart sounds. No murmur.   Pulmonary:      Effort: Pulmonary effort is normal. No respiratory distress.      Breath sounds: Normal breath sounds. No wheezing or rales.   Chest:      Chest wall: No tenderness.   Abdominal:      General: Bowel sounds are normal. There is no distension.      Palpations: Abdomen is soft. There is no mass.      Tenderness: There is no abdominal tenderness. There is no guarding or rebound.   Musculoskeletal: Normal range of motion.      Comments: TTP across all MTP joints right foot    Lymphadenopathy:      Cervical: No cervical adenopathy.   Skin:     General: Skin is warm and dry.   Neurological:      Mental Status: He is alert and oriented to person, place, and time.      Cranial Nerves: No cranial nerve deficit.      Motor: No abnormal muscle tone.       Coordination: Coordination normal.      Deep Tendon Reflexes: Reflexes are normal and symmetric. Reflexes normal.   Psychiatric:         Behavior: Behavior normal.         Thought Content: Thought content normal.         Judgment: Judgment normal.         Assessment:       1. Gout, arthritis    2. Screening for prostate cancer        Plan:   Shawn HANNAH Sr was seen today for gout.    Diagnoses and all orders for this visit:    Gout, arthritis    Screening for prostate cancer  -     PSA, Screening; Future      Problem List Items Addressed This Visit     None      Visit Diagnoses     Gout, arthritis    -  Primary    Screening for prostate cancer        Relevant Orders    PSA, Screening        Increase colchicine to BID for attacks  Call me if this isn't working for acute attacks and I can send in short course of steroids.   No NSAIDs   No follow-ups on file.

## 2020-08-17 ENCOUNTER — TELEPHONE (OUTPATIENT)
Dept: FAMILY MEDICINE | Facility: CLINIC | Age: 65
End: 2020-08-17

## 2020-08-17 RX ORDER — PANTOPRAZOLE SODIUM 40 MG/1
40 TABLET, DELAYED RELEASE ORAL DAILY
Qty: 30 TABLET | Refills: 5 | Status: SHIPPED | OUTPATIENT
Start: 2020-08-17 | End: 2020-11-02

## 2020-08-17 NOTE — TELEPHONE ENCOUNTER
Spoke with wife/Sarah--said that pt was on Zantac--taken off the market. Has tried Nexium and omeprazole OTC, doesn't work. Wants to know if you would send in something else to replace.    Spoke with Terrell at [facility] about interaction between Colchicine and diltiazem. Advised him that this pt has been on this combination for years. Ok to fill colchicine.

## 2020-08-17 NOTE — TELEPHONE ENCOUNTER
Sending in protonix to [facility]   Let me know if it doesn't work. It takes about 3-5 days to really work well thanks

## 2020-08-17 NOTE — TELEPHONE ENCOUNTER
----- Message from Roman Barker sent at 2020 10:33 AM CDT -----  Contact: Patient  Shawn Hand  MRN: [id number]  : 1955  PCP: Jaison Tesfaye  Home Phone      [phone]  Work Phone      Not on file.  Mobile          [phone]      MESSAGE:  Pharmacy will not refill Colchicine - states adverse reaction with other medication -- wife states he has been taking this medication for gout & has had no problems in the past  -- [facility] [place]    Call Sarah @ [phone]    PCP: Mera

## 2020-09-03 ENCOUNTER — TELEPHONE (OUTPATIENT)
Dept: FAMILY MEDICINE | Facility: CLINIC | Age: 65
End: 2020-09-03

## 2020-09-03 NOTE — TELEPHONE ENCOUNTER
----- Message from Roman Barker sent at 9/3/2020 10:08 AM CDT -----  Contact: Wife - Sarah Hand  MRN: [id number]  : 1955  PCP: Jaison Tesfaye  Home Phone      [phone]  Work Phone      Not on file.  Mobile          [phone]      MESSAGE:  poison oak -- requesting Rx (enough for 2 people, if possible) -- uses [facility] in [place]    Call Sarah @ [phone]    PCP: Mera    (Spoke to Sarah (patients spouse, offered appointment today with Dr. Pepe, declined. States she would like for  to address request tomorrow when he returns. Advise) LRLPN.

## 2020-09-04 RX ORDER — TRIAMCINOLONE ACETONIDE 1 MG/G
OINTMENT TOPICAL 2 TIMES DAILY
Qty: 80 G | Refills: 1 | Status: SHIPPED | OUTPATIENT
Start: 2020-09-04 | End: 2020-09-14

## 2020-09-25 ENCOUNTER — PATIENT MESSAGE (OUTPATIENT)
Dept: OTHER | Facility: OTHER | Age: 65
End: 2020-09-25

## 2020-10-08 ENCOUNTER — TELEPHONE (OUTPATIENT)
Dept: FAMILY MEDICINE | Facility: CLINIC | Age: 65
End: 2020-10-08

## 2020-10-08 NOTE — TELEPHONE ENCOUNTER
Patient not out of medication, added Symbicort to refill request sent from [facility] pharmacy through interface directly to .

## 2020-10-08 NOTE — TELEPHONE ENCOUNTER
----- Message from Roman Barker sent at 10/8/2020  9:36 AM CDT -----  Contact: Wife - Sarah Hand  MRN: [id number]  : 1955  PCP: Jaison Tesfaye  Home Phone      [phone]  Work Phone      Not on file.  Mobile          [phone]      MESSAGE:   Pt requesting refill or new Rx.   Is this a refill or new RX:  refills  RX name and strength: Symbicort - Allopurinol 100 mg   Last office visit: 20  Is this a 30-day or 90-day RX:  90 day  [facility] [place]  Comments:      Phone:  Sarah @ [phone]    PCP: Mera

## 2020-10-11 RX ORDER — BUDESONIDE AND FORMOTEROL FUMARATE DIHYDRATE 160; 4.5 UG/1; UG/1
2 AEROSOL RESPIRATORY (INHALATION) 2 TIMES DAILY
Qty: 10.2 G | Refills: 3 | Status: SHIPPED | OUTPATIENT
Start: 2020-10-11 | End: 2021-12-10 | Stop reason: SDUPTHER

## 2020-10-11 RX ORDER — ALLOPURINOL 100 MG/1
TABLET ORAL
Qty: 90 TABLET | Refills: 1 | Status: SHIPPED | OUTPATIENT
Start: 2020-10-11 | End: 2021-04-11

## 2020-11-02 ENCOUNTER — TELEPHONE (OUTPATIENT)
Dept: FAMILY MEDICINE | Facility: CLINIC | Age: 65
End: 2020-11-02

## 2020-11-02 DIAGNOSIS — K21.9 GASTROESOPHAGEAL REFLUX DISEASE, UNSPECIFIED WHETHER ESOPHAGITIS PRESENT: Primary | ICD-10-CM

## 2020-11-02 RX ORDER — ESOMEPRAZOLE MAGNESIUM 40 MG/1
40 CAPSULE, DELAYED RELEASE ORAL
Qty: 30 CAPSULE | Refills: 11 | Status: SHIPPED | OUTPATIENT
Start: 2020-11-02 | End: 2020-11-02 | Stop reason: SDUPTHER

## 2020-11-02 RX ORDER — ESOMEPRAZOLE MAGNESIUM 40 MG/1
40 CAPSULE, DELAYED RELEASE ORAL
Qty: 30 CAPSULE | Refills: 11 | Status: SHIPPED | OUTPATIENT
Start: 2020-11-02 | End: 2021-04-04

## 2020-11-02 NOTE — TELEPHONE ENCOUNTER
----- Message from Ritu Hinton sent at 2020  9:26 AM CST -----  Regarding: med refill  Contact: wife-Sarah Hand  MRN: [id number]  : 1955  PCP: Jaison Tesfaye  Home Phone      [phone]  Work Phone      Not on file.  Mobile          [phone]      MESSAGE:   Wife would like to have Omeprazole 40MG to be called in for him to [facility] in [place].     Phone:  [phone]

## 2020-11-02 NOTE — TELEPHONE ENCOUNTER
Patient wife reporting patient unable to tolerate the pantoprazole--states it contsipates him. She borrowed some of her mother's generic nexium, gave this to the patient over a week, and he tolerated this much better.     Would like a script for esomeprazole 40mg sent in to [facility] in [place].

## 2020-11-02 NOTE — TELEPHONE ENCOUNTER
Patient wife notified.   Pt is a 89 y.o. female with a PMHx of afib on digoxin (not on AC), severe AS, COPD, CLL (previously on Treanda and Rituxan), cirrhosis in the setting of HCC with portal htn, c/b esophageal varices s/p banding s/p hepatic vessel coiling, CKD 3bl Cr 1.2, PVD, hypothyroidism, and recurrent UTI w/ hx of ESBL Klebsiella. Pt last admit 1/6-9 s/p fall OOB now re-admitted from [facility] w AMS 2/2 Septic Shock 2/2 Serratia Bacteremia / urosepsis, and PNA. Continued below.

## 2020-12-07 ENCOUNTER — OFFICE VISIT (OUTPATIENT)
Dept: FAMILY MEDICINE | Facility: CLINIC | Age: 65
End: 2020-12-07
Payer: COMMERCIAL

## 2020-12-07 VITALS
BODY MASS INDEX: 43.52 KG/M2 | SYSTOLIC BLOOD PRESSURE: 130 MMHG | RESPIRATION RATE: 16 BRPM | HEIGHT: 71 IN | WEIGHT: 310.88 LBS | HEART RATE: 80 BPM | TEMPERATURE: 98 F | DIASTOLIC BLOOD PRESSURE: 72 MMHG

## 2020-12-07 DIAGNOSIS — B02.9 HERPES ZOSTER WITHOUT COMPLICATION: Primary | ICD-10-CM

## 2020-12-07 PROCEDURE — 3078F PR MOST RECENT DIASTOLIC BLOOD PRESSURE < 80 MM HG: ICD-10-PCS | Mod: CPTII,S$GLB,, | Performed by: FAMILY MEDICINE

## 2020-12-07 PROCEDURE — 1126F AMNT PAIN NOTED NONE PRSNT: CPT | Mod: S$GLB,,, | Performed by: FAMILY MEDICINE

## 2020-12-07 PROCEDURE — 1126F PR PAIN SEVERITY QUANTIFIED, NO PAIN PRESENT: ICD-10-PCS | Mod: S$GLB,,, | Performed by: FAMILY MEDICINE

## 2020-12-07 PROCEDURE — 3075F SYST BP GE 130 - 139MM HG: CPT | Mod: CPTII,S$GLB,, | Performed by: FAMILY MEDICINE

## 2020-12-07 PROCEDURE — 99213 PR OFFICE/OUTPT VISIT, EST, LEVL III, 20-29 MIN: ICD-10-PCS | Mod: S$GLB,,, | Performed by: FAMILY MEDICINE

## 2020-12-07 PROCEDURE — 99213 OFFICE O/P EST LOW 20 MIN: CPT | Mod: S$GLB,,, | Performed by: FAMILY MEDICINE

## 2020-12-07 PROCEDURE — 3075F PR MOST RECENT SYSTOLIC BLOOD PRESS GE 130-139MM HG: ICD-10-PCS | Mod: CPTII,S$GLB,, | Performed by: FAMILY MEDICINE

## 2020-12-07 PROCEDURE — 3078F DIAST BP <80 MM HG: CPT | Mod: CPTII,S$GLB,, | Performed by: FAMILY MEDICINE

## 2020-12-07 PROCEDURE — 3008F BODY MASS INDEX DOCD: CPT | Mod: CPTII,S$GLB,, | Performed by: FAMILY MEDICINE

## 2020-12-07 PROCEDURE — 99999 PR PBB SHADOW E&M-EST. PATIENT-LVL IV: CPT | Mod: PBBFAC,,, | Performed by: FAMILY MEDICINE

## 2020-12-07 PROCEDURE — 99999 PR PBB SHADOW E&M-EST. PATIENT-LVL IV: ICD-10-PCS | Mod: PBBFAC,,, | Performed by: FAMILY MEDICINE

## 2020-12-07 PROCEDURE — 3008F PR BODY MASS INDEX (BMI) DOCUMENTED: ICD-10-PCS | Mod: CPTII,S$GLB,, | Performed by: FAMILY MEDICINE

## 2020-12-07 RX ORDER — TRIAMCINOLONE ACETONIDE 1 MG/G
OINTMENT TOPICAL 2 TIMES DAILY
Qty: 1 TUBE | Refills: 1 | Status: SHIPPED | OUTPATIENT
Start: 2020-12-07 | End: 2022-07-22

## 2020-12-07 RX ORDER — VALACYCLOVIR HYDROCHLORIDE 1 G/1
1000 TABLET, FILM COATED ORAL 3 TIMES DAILY
Qty: 21 TABLET | Refills: 0 | Status: SHIPPED | OUTPATIENT
Start: 2020-12-07 | End: 2022-07-22

## 2020-12-07 NOTE — PROGRESS NOTES
Subjective:       Patient ID: Shawn Hand is a 64 y.o. male.    Chief Complaint: Insect Bite (rash and welps on left ear and upper and lower neck)    Pt is a 64 y.o. male who presents for evaluation and management of   Encounter Diagnosis   Name Primary?    Herpes zoster without complication Yes     Doing well on current meds. Denies any side effects. Prevention is up to date.    Review of Systems   Constitutional: Negative for fever.   Eyes: Negative for pain.   Skin: Positive for rash.       Objective:      Physical Exam  Constitutional:       Appearance: He is well-developed.   HENT:      Head: Normocephalic and atraumatic.      Right Ear: External ear normal.      Left Ear: External ear normal.      Nose: Nose normal.   Eyes:      General: No scleral icterus.        Right eye: No discharge.         Left eye: No discharge.      Conjunctiva/sclera: Conjunctivae normal.      Pupils: Pupils are equal, round, and reactive to light.   Neck:      Musculoskeletal: Normal range of motion and neck supple.      Thyroid: No thyromegaly.      Vascular: No JVD.      Trachea: No tracheal deviation.   Cardiovascular:      Rate and Rhythm: Normal rate and regular rhythm.      Heart sounds: Normal heart sounds. No murmur.   Pulmonary:      Effort: Pulmonary effort is normal. No respiratory distress.      Breath sounds: Normal breath sounds. No wheezing or rales.   Chest:      Chest wall: No tenderness.   Abdominal:      General: Bowel sounds are normal. There is no distension.      Palpations: Abdomen is soft. There is no mass.      Tenderness: There is no abdominal tenderness. There is no guarding or rebound.   Musculoskeletal: Normal range of motion.   Lymphadenopathy:      Cervical: No cervical adenopathy.   Skin:     General: Skin is warm and dry.      Findings: Rash present.      Comments: Vesicular scabbed rash left scalp and neck    Neurological:      Mental Status: He is alert and oriented to person, place, and  time.      Cranial Nerves: No cranial nerve deficit.      Motor: No abnormal muscle tone.      Coordination: Coordination normal.      Deep Tendon Reflexes: Reflexes are normal and symmetric. Reflexes normal.   Psychiatric:         Behavior: Behavior normal.         Thought Content: Thought content normal.         Judgment: Judgment normal.         Assessment:       1. Herpes zoster without complication        Plan:   Shawn HANNAH Sr was seen today for insect bite.    Diagnoses and all orders for this visit:    Herpes zoster without complication  -     valACYclovir (VALTREX) 1000 MG tablet; Take 1 tablet (1,000 mg total) by mouth 3 (three) times daily. for 7 days      Problem List Items Addressed This Visit     None      Visit Diagnoses     Herpes zoster without complication    -  Primary    Relevant Medications    valACYclovir (VALTREX) 1000 MG tablet        No follow-ups on file.

## 2020-12-11 ENCOUNTER — PATIENT MESSAGE (OUTPATIENT)
Dept: OTHER | Facility: OTHER | Age: 65
End: 2020-12-11

## 2021-02-22 ENCOUNTER — APPOINTMENT (OUTPATIENT)
Dept: RADIOLOGY | Facility: CLINIC | Age: 66
End: 2021-02-22
Attending: FAMILY MEDICINE
Payer: COMMERCIAL

## 2021-02-22 ENCOUNTER — OFFICE VISIT (OUTPATIENT)
Dept: FAMILY MEDICINE | Facility: CLINIC | Age: 66
End: 2021-02-22
Payer: COMMERCIAL

## 2021-02-22 VITALS
TEMPERATURE: 98 F | WEIGHT: 314.5 LBS | BODY MASS INDEX: 44.03 KG/M2 | DIASTOLIC BLOOD PRESSURE: 90 MMHG | RESPIRATION RATE: 18 BRPM | HEART RATE: 84 BPM | SYSTOLIC BLOOD PRESSURE: 142 MMHG | HEIGHT: 71 IN

## 2021-02-22 DIAGNOSIS — J40 BRONCHITIS: ICD-10-CM

## 2021-02-22 DIAGNOSIS — R05.9 COUGH: ICD-10-CM

## 2021-02-22 DIAGNOSIS — R05.9 COUGH: Primary | ICD-10-CM

## 2021-02-22 PROCEDURE — 3288F FALL RISK ASSESSMENT DOCD: CPT | Mod: CPTII,S$GLB,, | Performed by: FAMILY MEDICINE

## 2021-02-22 PROCEDURE — 3008F PR BODY MASS INDEX (BMI) DOCUMENTED: ICD-10-PCS | Mod: CPTII,S$GLB,, | Performed by: FAMILY MEDICINE

## 2021-02-22 PROCEDURE — 1101F PT FALLS ASSESS-DOCD LE1/YR: CPT | Mod: CPTII,S$GLB,, | Performed by: FAMILY MEDICINE

## 2021-02-22 PROCEDURE — 96372 PR INJECTION,THERAP/PROPH/DIAG2ST, IM OR SUBCUT: ICD-10-PCS | Mod: S$GLB,,, | Performed by: FAMILY MEDICINE

## 2021-02-22 PROCEDURE — 3077F PR MOST RECENT SYSTOLIC BLOOD PRESSURE >= 140 MM HG: ICD-10-PCS | Mod: CPTII,S$GLB,, | Performed by: FAMILY MEDICINE

## 2021-02-22 PROCEDURE — 3080F DIAST BP >= 90 MM HG: CPT | Mod: CPTII,S$GLB,, | Performed by: FAMILY MEDICINE

## 2021-02-22 PROCEDURE — 99999 PR PBB SHADOW E&M-EST. PATIENT-LVL IV: ICD-10-PCS | Mod: PBBFAC,,, | Performed by: FAMILY MEDICINE

## 2021-02-22 PROCEDURE — 99214 OFFICE O/P EST MOD 30 MIN: CPT | Mod: 25,S$GLB,, | Performed by: FAMILY MEDICINE

## 2021-02-22 PROCEDURE — 3080F PR MOST RECENT DIASTOLIC BLOOD PRESSURE >= 90 MM HG: ICD-10-PCS | Mod: CPTII,S$GLB,, | Performed by: FAMILY MEDICINE

## 2021-02-22 PROCEDURE — 71046 X-RAY EXAM CHEST 2 VIEWS: CPT | Mod: 26,,, | Performed by: RADIOLOGY

## 2021-02-22 PROCEDURE — 3077F SYST BP >= 140 MM HG: CPT | Mod: CPTII,S$GLB,, | Performed by: FAMILY MEDICINE

## 2021-02-22 PROCEDURE — 1101F PR PT FALLS ASSESS DOC 0-1 FALLS W/OUT INJ PAST YR: ICD-10-PCS | Mod: CPTII,S$GLB,, | Performed by: FAMILY MEDICINE

## 2021-02-22 PROCEDURE — 99214 PR OFFICE/OUTPT VISIT, EST, LEVL IV, 30-39 MIN: ICD-10-PCS | Mod: 25,S$GLB,, | Performed by: FAMILY MEDICINE

## 2021-02-22 PROCEDURE — 71046 XR CHEST PA AND LATERAL: ICD-10-PCS | Mod: 26,,, | Performed by: RADIOLOGY

## 2021-02-22 PROCEDURE — 71046 X-RAY EXAM CHEST 2 VIEWS: CPT | Mod: TC,PO

## 2021-02-22 PROCEDURE — 3008F BODY MASS INDEX DOCD: CPT | Mod: CPTII,S$GLB,, | Performed by: FAMILY MEDICINE

## 2021-02-22 PROCEDURE — 3288F PR FALLS RISK ASSESSMENT DOCUMENTED: ICD-10-PCS | Mod: CPTII,S$GLB,, | Performed by: FAMILY MEDICINE

## 2021-02-22 PROCEDURE — 1126F PR PAIN SEVERITY QUANTIFIED, NO PAIN PRESENT: ICD-10-PCS | Mod: S$GLB,,, | Performed by: FAMILY MEDICINE

## 2021-02-22 PROCEDURE — 96372 THER/PROPH/DIAG INJ SC/IM: CPT | Mod: S$GLB,,, | Performed by: FAMILY MEDICINE

## 2021-02-22 PROCEDURE — 99999 PR PBB SHADOW E&M-EST. PATIENT-LVL IV: CPT | Mod: PBBFAC,,, | Performed by: FAMILY MEDICINE

## 2021-02-22 PROCEDURE — 1126F AMNT PAIN NOTED NONE PRSNT: CPT | Mod: S$GLB,,, | Performed by: FAMILY MEDICINE

## 2021-02-22 RX ORDER — LEVOFLOXACIN 500 MG/1
500 TABLET, FILM COATED ORAL DAILY
Qty: 7 TABLET | Refills: 0 | Status: SHIPPED | OUTPATIENT
Start: 2021-02-22 | End: 2021-03-01

## 2021-02-22 RX ORDER — PROMETHAZINE HYDROCHLORIDE AND DEXTROMETHORPHAN HYDROBROMIDE 6.25; 15 MG/5ML; MG/5ML
5 SYRUP ORAL EVERY 6 HOURS PRN
Qty: 180 ML | Refills: 0 | Status: SHIPPED | OUTPATIENT
Start: 2021-02-22 | End: 2021-03-04

## 2021-02-22 RX ORDER — METHYLPREDNISOLONE ACETATE 40 MG/ML
60 INJECTION, SUSPENSION INTRA-ARTICULAR; INTRALESIONAL; INTRAMUSCULAR; SOFT TISSUE
Status: COMPLETED | OUTPATIENT
Start: 2021-02-22 | End: 2021-02-22

## 2021-02-22 RX ADMIN — METHYLPREDNISOLONE ACETATE 60 MG: 40 INJECTION, SUSPENSION INTRA-ARTICULAR; INTRALESIONAL; INTRAMUSCULAR; SOFT TISSUE at 09:02

## 2021-05-04 ENCOUNTER — TELEPHONE (OUTPATIENT)
Dept: FAMILY MEDICINE | Facility: CLINIC | Age: 66
End: 2021-05-04

## 2021-05-04 ENCOUNTER — PATIENT MESSAGE (OUTPATIENT)
Dept: RESEARCH | Facility: HOSPITAL | Age: 66
End: 2021-05-04

## 2021-05-04 RX ORDER — ESOMEPRAZOLE MAGNESIUM 40 MG/1
40 CAPSULE, DELAYED RELEASE ORAL
Qty: 90 CAPSULE | Refills: 1 | Status: SHIPPED | OUTPATIENT
Start: 2021-05-04 | End: 2021-05-25 | Stop reason: SDUPTHER

## 2021-05-21 DIAGNOSIS — J43.9 PULMONARY EMPHYSEMA, UNSPECIFIED EMPHYSEMA TYPE: Primary | ICD-10-CM

## 2021-05-25 RX ORDER — ESOMEPRAZOLE MAGNESIUM 40 MG/1
40 CAPSULE, DELAYED RELEASE ORAL
Qty: 90 CAPSULE | Refills: 1 | Status: SHIPPED | OUTPATIENT
Start: 2021-05-25 | End: 2021-11-18

## 2021-10-10 ENCOUNTER — TELEPHONE (OUTPATIENT)
Dept: FAMILY MEDICINE | Facility: CLINIC | Age: 66
End: 2021-10-10

## 2021-12-10 RX ORDER — BUDESONIDE AND FORMOTEROL FUMARATE DIHYDRATE 160; 4.5 UG/1; UG/1
2 AEROSOL RESPIRATORY (INHALATION) 2 TIMES DAILY
Qty: 10.2 G | Refills: 3 | Status: SHIPPED | OUTPATIENT
Start: 2021-12-10 | End: 2022-05-11

## 2021-12-10 RX ORDER — ALBUTEROL SULFATE 1.25 MG/3ML
SOLUTION RESPIRATORY (INHALATION)
Qty: 75 ML | Refills: 5 | Status: SHIPPED | OUTPATIENT
Start: 2021-12-10 | End: 2022-02-10 | Stop reason: SDUPTHER

## 2021-12-23 ENCOUNTER — TELEPHONE (OUTPATIENT)
Dept: FAMILY MEDICINE | Facility: CLINIC | Age: 66
End: 2021-12-23
Payer: COMMERCIAL

## 2021-12-23 RX ORDER — ALBUTEROL SULFATE 1.25 MG/3ML
1.25 SOLUTION RESPIRATORY (INHALATION) 3 TIMES DAILY PRN
Qty: 60 EACH | Refills: 5 | Status: SHIPPED | OUTPATIENT
Start: 2021-12-23 | End: 2022-08-31

## 2022-01-13 ENCOUNTER — LAB VISIT (OUTPATIENT)
Dept: FAMILY MEDICINE | Facility: CLINIC | Age: 67
End: 2022-01-13
Payer: MEDICARE

## 2022-01-13 DIAGNOSIS — Z11.52 ENCOUNTER FOR SCREENING FOR COVID-19: Primary | ICD-10-CM

## 2022-01-13 LAB
CTP QC/QA: YES
SARS-COV-2 AG RESP QL IA.RAPID: POSITIVE

## 2022-01-13 PROCEDURE — 87811 SARS CORONAVIRUS 2 ANTIGEN POCT, MANUAL READ: ICD-10-PCS | Mod: S$GLB,,, | Performed by: INTERNAL MEDICINE

## 2022-01-13 PROCEDURE — 87811 SARS-COV-2 COVID19 W/OPTIC: CPT | Mod: S$GLB,,, | Performed by: INTERNAL MEDICINE

## 2022-01-13 NOTE — PROGRESS NOTES
Instructions for Patients with Confirmed or Suspected COVID-19    If you are awaiting your test result, you will either be called or it will be released to the patient portal.  If you have any questions about your test, please visit [email or web address] or call our COVID-19 information line at [phone].      Please isolate yourself at home.  You may leave home and/or return to work once the following conditions are met:    If you have symptoms and tested positive:   More than 5 days since symptoms first appeared AND   More than 24 hours fever free without medications AND       symptoms have improved   · For five days after ending isolation, masks are required.    If you had no symptoms but tested positive:   More than 5 days since the date of the first positive test. If you develop symptoms, then use the guidelines above  · For five days after ending isolation, masks are required.      Testing is not recommended if you are symptom free after completing isolation.

## 2022-02-04 ENCOUNTER — PATIENT OUTREACH (OUTPATIENT)
Dept: ADMINISTRATIVE | Facility: HOSPITAL | Age: 67
End: 2022-02-04
Payer: COMMERCIAL

## 2022-02-04 NOTE — PROGRESS NOTES
Chart reviewed, immunization record updated.  No new results noted on [facility] or [facility] web site.  Care Everywhere update unsuccessful.  Patient care coordination note updated.  TISHA sent to Dr. Way for Lipid Panel results, Dr. Way added to patient care team.  Spoke to patient, states he will call back with remote B/P reading.

## 2022-02-04 NOTE — LETTER
AUTHORIZATION FOR RELEASE OF   CONFIDENTIAL INFORMATION    Dear Dr. Gen Way    We are seeing Shawn Hand, date of birth 1955, in the clinic at [facility]. Jaison Tesfaye MD is the patient's PCP. Shawn Hand has an outstanding lab/procedure at the time we reviewed his chart. In order to help keep his health information updated, he has authorized us to request the following medical record(s):            ( X )  OUTSIDE LAB RESULTS (Lipid Panel)          Please fax records to [facility], Andre D Duplantis, MD,   María Layne LPN  Clinical Care Coordinator  [facility]  [facility]  [facility]  Phone: [phone]  Fax: [phone]            Patient Name: Shawn Hand  : 1955  Patient Phone #: [phone]

## 2022-02-10 ENCOUNTER — OFFICE VISIT (OUTPATIENT)
Dept: FAMILY MEDICINE | Facility: CLINIC | Age: 67
End: 2022-02-10
Payer: COMMERCIAL

## 2022-02-10 VITALS
SYSTOLIC BLOOD PRESSURE: 134 MMHG | DIASTOLIC BLOOD PRESSURE: 88 MMHG | OXYGEN SATURATION: 98 % | RESPIRATION RATE: 18 BRPM | HEIGHT: 71 IN | HEART RATE: 92 BPM | WEIGHT: 314.81 LBS | BODY MASS INDEX: 44.07 KG/M2

## 2022-02-10 DIAGNOSIS — J32.9 SINUSITIS, UNSPECIFIED CHRONICITY, UNSPECIFIED LOCATION: Primary | ICD-10-CM

## 2022-02-10 PROCEDURE — 99999 PR PBB SHADOW E&M-EST. PATIENT-LVL IV: CPT | Mod: PBBFAC,,, | Performed by: FAMILY MEDICINE

## 2022-02-10 PROCEDURE — 3079F DIAST BP 80-89 MM HG: CPT | Mod: CPTII,S$GLB,, | Performed by: FAMILY MEDICINE

## 2022-02-10 PROCEDURE — 1160F RVW MEDS BY RX/DR IN RCRD: CPT | Mod: CPTII,S$GLB,, | Performed by: FAMILY MEDICINE

## 2022-02-10 PROCEDURE — 3008F PR BODY MASS INDEX (BMI) DOCUMENTED: ICD-10-PCS | Mod: CPTII,S$GLB,, | Performed by: FAMILY MEDICINE

## 2022-02-10 PROCEDURE — 96372 PR INJECTION,THERAP/PROPH/DIAG2ST, IM OR SUBCUT: ICD-10-PCS | Mod: S$GLB,,, | Performed by: FAMILY MEDICINE

## 2022-02-10 PROCEDURE — 99213 OFFICE O/P EST LOW 20 MIN: CPT | Mod: 25,S$GLB,, | Performed by: FAMILY MEDICINE

## 2022-02-10 PROCEDURE — 1159F MED LIST DOCD IN RCRD: CPT | Mod: CPTII,S$GLB,, | Performed by: FAMILY MEDICINE

## 2022-02-10 PROCEDURE — 1126F AMNT PAIN NOTED NONE PRSNT: CPT | Mod: CPTII,S$GLB,, | Performed by: FAMILY MEDICINE

## 2022-02-10 PROCEDURE — 1101F PR PT FALLS ASSESS DOC 0-1 FALLS W/OUT INJ PAST YR: ICD-10-PCS | Mod: CPTII,S$GLB,, | Performed by: FAMILY MEDICINE

## 2022-02-10 PROCEDURE — 1159F PR MEDICATION LIST DOCUMENTED IN MEDICAL RECORD: ICD-10-PCS | Mod: CPTII,S$GLB,, | Performed by: FAMILY MEDICINE

## 2022-02-10 PROCEDURE — 1126F PR PAIN SEVERITY QUANTIFIED, NO PAIN PRESENT: ICD-10-PCS | Mod: CPTII,S$GLB,, | Performed by: FAMILY MEDICINE

## 2022-02-10 PROCEDURE — 1160F PR REVIEW ALL MEDS BY PRESCRIBER/CLIN PHARMACIST DOCUMENTED: ICD-10-PCS | Mod: CPTII,S$GLB,, | Performed by: FAMILY MEDICINE

## 2022-02-10 PROCEDURE — 1101F PT FALLS ASSESS-DOCD LE1/YR: CPT | Mod: CPTII,S$GLB,, | Performed by: FAMILY MEDICINE

## 2022-02-10 PROCEDURE — 3288F PR FALLS RISK ASSESSMENT DOCUMENTED: ICD-10-PCS | Mod: CPTII,S$GLB,, | Performed by: FAMILY MEDICINE

## 2022-02-10 PROCEDURE — 3079F PR MOST RECENT DIASTOLIC BLOOD PRESSURE 80-89 MM HG: ICD-10-PCS | Mod: CPTII,S$GLB,, | Performed by: FAMILY MEDICINE

## 2022-02-10 PROCEDURE — 96372 THER/PROPH/DIAG INJ SC/IM: CPT | Mod: S$GLB,,, | Performed by: FAMILY MEDICINE

## 2022-02-10 PROCEDURE — 99999 PR PBB SHADOW E&M-EST. PATIENT-LVL IV: ICD-10-PCS | Mod: PBBFAC,,, | Performed by: FAMILY MEDICINE

## 2022-02-10 PROCEDURE — 3008F BODY MASS INDEX DOCD: CPT | Mod: CPTII,S$GLB,, | Performed by: FAMILY MEDICINE

## 2022-02-10 PROCEDURE — 3288F FALL RISK ASSESSMENT DOCD: CPT | Mod: CPTII,S$GLB,, | Performed by: FAMILY MEDICINE

## 2022-02-10 PROCEDURE — 3075F PR MOST RECENT SYSTOLIC BLOOD PRESS GE 130-139MM HG: ICD-10-PCS | Mod: CPTII,S$GLB,, | Performed by: FAMILY MEDICINE

## 2022-02-10 PROCEDURE — 99213 PR OFFICE/OUTPT VISIT, EST, LEVL III, 20-29 MIN: ICD-10-PCS | Mod: 25,S$GLB,, | Performed by: FAMILY MEDICINE

## 2022-02-10 PROCEDURE — 3075F SYST BP GE 130 - 139MM HG: CPT | Mod: CPTII,S$GLB,, | Performed by: FAMILY MEDICINE

## 2022-02-10 RX ORDER — LEVOFLOXACIN 500 MG/1
500 TABLET, FILM COATED ORAL DAILY
Qty: 10 TABLET | Refills: 0 | Status: SHIPPED | OUTPATIENT
Start: 2022-02-10 | End: 2022-02-20

## 2022-02-10 RX ORDER — PROMETHAZINE HYDROCHLORIDE AND DEXTROMETHORPHAN HYDROBROMIDE 6.25; 15 MG/5ML; MG/5ML
5 SYRUP ORAL EVERY 6 HOURS PRN
Qty: 180 ML | Refills: 0 | Status: SHIPPED | OUTPATIENT
Start: 2022-02-10 | End: 2022-02-20

## 2022-02-10 RX ORDER — TRIAMCINOLONE ACETONIDE 40 MG/ML
60 INJECTION, SUSPENSION INTRA-ARTICULAR; INTRAMUSCULAR
Status: COMPLETED | OUTPATIENT
Start: 2022-02-10 | End: 2022-02-10

## 2022-02-10 RX ORDER — DOXYCYCLINE HYCLATE 100 MG
100 TABLET ORAL 2 TIMES DAILY
COMMUNITY
Start: 2022-02-06 | End: 2022-07-22

## 2022-02-10 RX ADMIN — TRIAMCINOLONE ACETONIDE 60 MG: 40 INJECTION, SUSPENSION INTRA-ARTICULAR; INTRAMUSCULAR at 10:02

## 2022-02-10 NOTE — PROGRESS NOTES
Subjective:       Patient ID: Shawn Hand is a 66 y.o. male.    Chief Complaint: Sinus Problem (X1 week)    Pt is a 66 y.o. male who presents for evaluation and management of   Encounter Diagnosis   Name Primary?    Sinusitis, unspecified chronicity, unspecified location Yes   .  Doing well on current meds. Denies any side effects. Prevention is up to date.  Review of Systems   Constitutional: Negative for fever.   HENT: Positive for congestion, postnasal drip and sinus pressure.    Respiratory: Positive for cough.        Objective:      Physical Exam  Vitals reviewed.   Constitutional:       Appearance: Normal appearance. He is well-developed and well-nourished.   HENT:      Head: Normocephalic and atraumatic.      Right Ear: Hearing, tympanic membrane, ear canal and external ear normal.      Left Ear: Hearing, tympanic membrane, ear canal and external ear normal.      Nose: Mucosal edema and rhinorrhea present.      Right Sinus: Maxillary sinus tenderness and frontal sinus tenderness present.      Left Sinus: Maxillary sinus tenderness and frontal sinus tenderness present.      Mouth/Throat:      Pharynx: Uvula midline. Posterior oropharyngeal edema present. No posterior oropharyngeal erythema.   Eyes:      Extraocular Movements: EOM normal.      Conjunctiva/sclera: Conjunctivae normal.      Pupils: Pupils are equal, round, and reactive to light.   Neck:      Thyroid: No thyromegaly.      Trachea: Trachea normal.   Cardiovascular:      Rate and Rhythm: Normal rate and regular rhythm.      Heart sounds: Normal heart sounds, S1 normal and S2 normal. No murmur heard.  No gallop.    Pulmonary:      Effort: Pulmonary effort is normal. No respiratory distress.      Breath sounds: Normal breath sounds.   Abdominal:      Palpations: Abdomen is soft.      Tenderness: There is no abdominal tenderness.   Musculoskeletal:         General: Normal range of motion.      Cervical back: Normal range of motion and neck  supple.   Lymphadenopathy:      Cervical: No cervical adenopathy.   Skin:     General: Skin is warm, dry and intact.   Neurological:      Mental Status: He is alert and oriented to person, place, and time.   Psychiatric:         Mood and Affect: Mood and affect normal.         Speech: Speech normal.         Behavior: Behavior normal.         Thought Content: Thought content normal.         Judgment: Judgment normal.         Assessment:       1. Sinusitis, unspecified chronicity, unspecified location        Plan:   Shawn HANNAH Sr was seen today for sinus problem.    Diagnoses and all orders for this visit:    Sinusitis, unspecified chronicity, unspecified location  -     promethazine-dextromethorphan (PROMETHAZINE-DM) 6.25-15 mg/5 mL Syrp; Take 5 mLs by mouth every 6 (six) hours as needed.  -     triamcinolone acetonide injection 60 mg  -     levoFLOXacin (LEVAQUIN) 500 MG tablet; Take 1 tablet (500 mg total) by mouth once daily. for 10 days      Problem List Items Addressed This Visit    None     Visit Diagnoses     Sinusitis, unspecified chronicity, unspecified location    -  Primary    Relevant Medications    promethazine-dextromethorphan (PROMETHAZINE-DM) 6.25-15 mg/5 mL Syrp    triamcinolone acetonide injection 60 mg (Completed)    levoFLOXacin (LEVAQUIN) 500 MG tablet        No follow-ups on file.

## 2022-03-13 DIAGNOSIS — M10.9 GOUT, ARTHRITIS: ICD-10-CM

## 2022-03-13 NOTE — TELEPHONE ENCOUNTER
Care Due:                  Date            Visit Type   Department     Provider  --------------------------------------------------------------------------------                                EP -                              PRIMARY      [facility] FAMILY  Last Visit: 02-      CARE ([facility])   MEDICINE       Jaison Tesfaye  Next Visit: None Scheduled  None         None Found                                                            Last  Test          Frequency    Reason                     Performed    Due Date  --------------------------------------------------------------------------------    CBC.........  12 months..  allopurinoL..............  Not Found    Overdue    CMP.........  12 months..  allopurinoL..............  Not Found    Overdue    Uric Acid...  12 months..  allopurinoL..............  Not Found    Overdue    Powered by [employer]. Reference number: [id number].   3/13/2022 12:28:30 PM CDT

## 2022-03-14 RX ORDER — COLCHICINE 0.6 MG/1
TABLET ORAL
Qty: 30 TABLET | Refills: 5 | Status: SHIPPED | OUTPATIENT
Start: 2022-03-14

## 2022-03-14 NOTE — TELEPHONE ENCOUNTER

## 2022-04-08 NOTE — TELEPHONE ENCOUNTER
No new care gaps identified.  Powered by [employer]. Reference number: [id number].   4/08/2022 9:47:57 AM CDT

## 2022-04-08 NOTE — TELEPHONE ENCOUNTER
Refill Routing Note   Medication(s) are not appropriate for processing by [facility] for the following reason(s):      - Required laboratory values are outdated    [facility] action(s):  Defer Medication-related problems identified: Requires labs        Medication reconciliation completed: No     Appointments  past 12m or future 3m with PCP    Date Provider   Last Visit   2/10/2022 Jaison Tesfaye MD   Next Visit   Visit date not found Jaison Tesfaye MD   ED visits in past 90 days: 0        Note composed:1:50 PM 04/08/2022

## 2022-04-08 NOTE — TELEPHONE ENCOUNTER
Refill Routing Note   Medication(s) are not appropriate for processing by [facility] for the following reason(s):      - Required laboratory values are outdated    [facility] action(s):  Defer Medication-related problems identified: Requires labs        --->Care Gap information included in message below if applicable.       Automatic Epic Generated Protocol Data:        Requested Prescriptions   Pending Prescriptions Disp Refills    allopurinoL (ZYLOPRIM) 100 MG tablet [Pharmacy Med Name: ALLOPURINOL 100 MG TABLET] 90 tablet 3     Sig: TAKE 1 TABLET BY MOUTH EVERY DAY       Gout Agents Protocol Failed - 4/8/2022 12:10 AM        Failed - ALT on record in past 12 months     Lab Results   Component Value Date    ALT 53 (H) 08/18/2018    ALT 45 (H) 06/19/2017              Failed - Uric acid on record in past 12 months     Lab Results   Component Value Date    URICACID 7.0 09/17/2018              Failed - Serum Creatinine less than 1.4 on file in the past 12 months     Lab Results   Component Value Date    CREATININE 1.0 08/21/2018    CREATININE 1.9 (H) 08/18/2018    CREATININE 0.90 06/19/2017     Lab Results   Component Value Date    EGFRNONAA >60 08/21/2018    EGFRNONAA 37 (A) 08/18/2018    EGFRNONAA >60 06/19/2017               Failed - CBC test on record in 12 months     Lab Results   Component Value Date    WBC 13.06 (H) 08/18/2018    RBC 5.21 08/18/2018    HGB 15.6 08/18/2018    MCV 88 08/18/2018    MCH 29.9 08/18/2018    MCHC 34.1 08/18/2018    RDW 14.4 08/18/2018     08/18/2018    MPV 9.9 08/18/2018    GRAN 8.2 (H) 08/18/2018    GRAN 62.9 08/18/2018    LYMPH 2.6 08/18/2018    LYMPH 19.9 08/18/2018    MONO 1.5 (H) 08/18/2018    MONO 11.5 08/18/2018    EOS 0.6 (H) 08/18/2018    BASO 0.11 08/18/2018                   Passed - Visit with authorizing provider in past 12 months or upcoming 90 days        Endocrinology:  Gout Agents - allopurinol Failed - 4/8/2022  9:47 AM        Failed - Uric Acid within  360 days     Uric Acid   Date Value Ref Range Status   09/17/2018 7.0 3.4 - 7.0 mg/dL Final   08/18/2018 7.7 (H) 3.4 - 7.0 mg/dL Final              Failed - Cr is 1.39 or below and within 360 days     Lab Results   Component Value Date    CREATININE 1.0 08/21/2018    CREATININE 1.9 (H) 08/18/2018    CREATININE 0.90 06/19/2017              Failed - WBC within 360 days     WBC   Date Value Ref Range Status   08/18/2018 13.06 (H) 3.90 - 12.70 K/uL Final   06/19/2017 10.20 3.90 - 12.70 K/uL Final              Failed - RBC within 360 days     RBC   Date Value Ref Range Status   08/18/2018 5.21 4.60 - 6.20 M/uL Final   06/19/2017 5.17 4.60 - 6.20 M/uL Final              Failed - HGB within 360 days     Hemoglobin   Date Value Ref Range Status   08/18/2018 15.6 14.0 - 18.0 g/dL Final   06/19/2017 15.6 14.0 - 18.0 g/dL Final              Failed - HCT within 360 days     Hematocrit   Date Value Ref Range Status   08/18/2018 45.8 40.0 - 54.0 % Final   06/19/2017 46.4 40.0 - 54.0 % Final              Failed - PLT within 360 days     Platelets   Date Value Ref Range Status   08/18/2018 294 150 - 350 K/uL Final   06/19/2017 201 150 - 350 K/uL Final              Failed - ALT is 131 or below and within 360 days     ALT   Date Value Ref Range Status   08/18/2018 53 (H) 10 - 44 U/L Final   06/19/2017 45 (H) 10 - 44 U/L Final              Failed - AST is 119 or below and within 360 days     AST   Date Value Ref Range Status   08/18/2018 44 (H) 10 - 40 U/L Final   06/19/2017 64 (H) 17 - 59 U/L Final              Failed - eGFR within 360 days     Lab Results   Component Value Date    EGFRNONAA >60 08/21/2018    EGFRNONAA 37 (A) 08/18/2018    EGFRNONAA >60 06/19/2017                Passed - Patient is at least 18 years old        Passed - Valid encounter within last 15 months     Recent Visits  Date Type Provider Dept   02/10/22 Office Visit Jaison Tesfaye MD [facility] Family Medicine   02/22/21 Office Visit Jaison Tesfaye MD [facility]  Family Medicine   12/07/20 Office Visit Jaison Tesfaye MD [facility] Family [facility]   08/04/20 Office Visit Jaison Tesfaye MD [facility] Family [facility]   Showing recent visits within past 720 days and meeting all other requirements  Future Appointments  No visits were found meeting these conditions.  Showing future appointments within next 150 days and meeting all other requirements                Passed - Matches previous order       Previous Authorizing Provider: Jaison Tesfaye MD (allopurinoL (ZYLOPRIM) 100 MG tablet)  Previous Pharmacy: [facility]pharmacy #[id number] [address] [facility] [address]            Passed - No ED/Hospital visits since last PCP visit     Last PCP Visit: 2/10/2022 Last Admission: 6/27/2017 Last ED Visit: 8/18/2018                Appointments  past 12m or future 3m with PCP    Date Provider   Last Visit   2/10/2022 Jaison Tesfaye MD   Next Visit   Visit date not found Jaison Tesfaye MD   ED visits in past 90 days: 0        Note composed:1:03 PM 04/08/2022

## 2022-04-10 RX ORDER — ALLOPURINOL 100 MG/1
TABLET ORAL
Qty: 90 TABLET | Refills: 0 | Status: SHIPPED | OUTPATIENT
Start: 2022-04-10 | End: 2022-07-11

## 2022-04-27 ENCOUNTER — CLINICAL SUPPORT (OUTPATIENT)
Dept: FAMILY MEDICINE | Facility: CLINIC | Age: 67
End: 2022-04-27
Payer: COMMERCIAL

## 2022-04-27 ENCOUNTER — OFFICE VISIT (OUTPATIENT)
Dept: FAMILY MEDICINE | Facility: CLINIC | Age: 67
End: 2022-04-27
Payer: COMMERCIAL

## 2022-04-27 ENCOUNTER — APPOINTMENT (OUTPATIENT)
Dept: RADIOLOGY | Facility: CLINIC | Age: 67
End: 2022-04-27
Attending: FAMILY MEDICINE
Payer: COMMERCIAL

## 2022-04-27 VITALS
HEART RATE: 80 BPM | DIASTOLIC BLOOD PRESSURE: 76 MMHG | RESPIRATION RATE: 18 BRPM | HEIGHT: 71 IN | WEIGHT: 311.94 LBS | OXYGEN SATURATION: 95 % | SYSTOLIC BLOOD PRESSURE: 122 MMHG | BODY MASS INDEX: 43.67 KG/M2

## 2022-04-27 DIAGNOSIS — R10.9 LEFT FLANK PAIN: Primary | ICD-10-CM

## 2022-04-27 DIAGNOSIS — R10.9 LEFT FLANK PAIN: ICD-10-CM

## 2022-04-27 PROCEDURE — 74018 RADEX ABDOMEN 1 VIEW: CPT | Mod: TC,PO

## 2022-04-27 PROCEDURE — 1159F PR MEDICATION LIST DOCUMENTED IN MEDICAL RECORD: ICD-10-PCS | Mod: CPTII,S$GLB,, | Performed by: FAMILY MEDICINE

## 2022-04-27 PROCEDURE — 99999 PR PBB SHADOW E&M-EST. PATIENT-LVL IV: CPT | Mod: PBBFAC,,, | Performed by: FAMILY MEDICINE

## 2022-04-27 PROCEDURE — 3008F BODY MASS INDEX DOCD: CPT | Mod: CPTII,S$GLB,, | Performed by: FAMILY MEDICINE

## 2022-04-27 PROCEDURE — 99214 OFFICE O/P EST MOD 30 MIN: CPT | Mod: 25,S$GLB,, | Performed by: FAMILY MEDICINE

## 2022-04-27 PROCEDURE — 96372 THER/PROPH/DIAG INJ SC/IM: CPT | Mod: S$GLB,,, | Performed by: FAMILY MEDICINE

## 2022-04-27 PROCEDURE — 3288F PR FALLS RISK ASSESSMENT DOCUMENTED: ICD-10-PCS | Mod: CPTII,S$GLB,, | Performed by: FAMILY MEDICINE

## 2022-04-27 PROCEDURE — 1101F PR PT FALLS ASSESS DOC 0-1 FALLS W/OUT INJ PAST YR: ICD-10-PCS | Mod: CPTII,S$GLB,, | Performed by: FAMILY MEDICINE

## 2022-04-27 PROCEDURE — 1125F PR PAIN SEVERITY QUANTIFIED, PAIN PRESENT: ICD-10-PCS | Mod: CPTII,S$GLB,, | Performed by: FAMILY MEDICINE

## 2022-04-27 PROCEDURE — 3078F DIAST BP <80 MM HG: CPT | Mod: CPTII,S$GLB,, | Performed by: FAMILY MEDICINE

## 2022-04-27 PROCEDURE — 99214 PR OFFICE/OUTPT VISIT, EST, LEVL IV, 30-39 MIN: ICD-10-PCS | Mod: 25,S$GLB,, | Performed by: FAMILY MEDICINE

## 2022-04-27 PROCEDURE — 81001 POCT URINALYSIS, DIPSTICK OR TABLET REAGENT, AUTOMATED, WITH MICROSCOP: ICD-10-PCS | Mod: S$GLB,,, | Performed by: FAMILY MEDICINE

## 2022-04-27 PROCEDURE — 3008F PR BODY MASS INDEX (BMI) DOCUMENTED: ICD-10-PCS | Mod: CPTII,S$GLB,, | Performed by: FAMILY MEDICINE

## 2022-04-27 PROCEDURE — 1159F MED LIST DOCD IN RCRD: CPT | Mod: CPTII,S$GLB,, | Performed by: FAMILY MEDICINE

## 2022-04-27 PROCEDURE — 3288F FALL RISK ASSESSMENT DOCD: CPT | Mod: CPTII,S$GLB,, | Performed by: FAMILY MEDICINE

## 2022-04-27 PROCEDURE — 81001 URINALYSIS AUTO W/SCOPE: CPT | Mod: S$GLB,,, | Performed by: FAMILY MEDICINE

## 2022-04-27 PROCEDURE — 74018 XR ABDOMEN AP 1 VIEW: ICD-10-PCS | Mod: 26,,, | Performed by: RADIOLOGY

## 2022-04-27 PROCEDURE — 96372 PR INJECTION,THERAP/PROPH/DIAG2ST, IM OR SUBCUT: ICD-10-PCS | Mod: S$GLB,,, | Performed by: FAMILY MEDICINE

## 2022-04-27 PROCEDURE — 3078F PR MOST RECENT DIASTOLIC BLOOD PRESSURE < 80 MM HG: ICD-10-PCS | Mod: CPTII,S$GLB,, | Performed by: FAMILY MEDICINE

## 2022-04-27 PROCEDURE — 3074F SYST BP LT 130 MM HG: CPT | Mod: CPTII,S$GLB,, | Performed by: FAMILY MEDICINE

## 2022-04-27 PROCEDURE — 99999 PR PBB SHADOW E&M-EST. PATIENT-LVL IV: ICD-10-PCS | Mod: PBBFAC,,, | Performed by: FAMILY MEDICINE

## 2022-04-27 PROCEDURE — 74018 RADEX ABDOMEN 1 VIEW: CPT | Mod: 26,,, | Performed by: RADIOLOGY

## 2022-04-27 PROCEDURE — 1125F AMNT PAIN NOTED PAIN PRSNT: CPT | Mod: CPTII,S$GLB,, | Performed by: FAMILY MEDICINE

## 2022-04-27 PROCEDURE — 1101F PT FALLS ASSESS-DOCD LE1/YR: CPT | Mod: CPTII,S$GLB,, | Performed by: FAMILY MEDICINE

## 2022-04-27 PROCEDURE — 3074F PR MOST RECENT SYSTOLIC BLOOD PRESSURE < 130 MM HG: ICD-10-PCS | Mod: CPTII,S$GLB,, | Performed by: FAMILY MEDICINE

## 2022-04-27 RX ORDER — HYDROCODONE BITARTRATE AND ACETAMINOPHEN 7.5; 325 MG/1; MG/1
1 TABLET ORAL EVERY 8 HOURS PRN
Qty: 20 TABLET | Refills: 0 | Status: SHIPPED | OUTPATIENT
Start: 2022-04-27 | End: 2022-07-22

## 2022-04-27 RX ORDER — TRIAMCINOLONE ACETONIDE 40 MG/ML
60 INJECTION, SUSPENSION INTRA-ARTICULAR; INTRAMUSCULAR
Status: COMPLETED | OUTPATIENT
Start: 2022-04-27 | End: 2022-04-27

## 2022-04-27 RX ORDER — LEVOFLOXACIN 500 MG/1
TABLET, FILM COATED ORAL
COMMUNITY
End: 2022-07-22

## 2022-04-27 RX ADMIN — TRIAMCINOLONE ACETONIDE 60 MG: 40 INJECTION, SUSPENSION INTRA-ARTICULAR; INTRAMUSCULAR at 03:04

## 2022-04-28 LAB
BACTERIA SPEC CULT: NORMAL /HPF
BILIRUB SERPL-MCNC: NORMAL MG/DL
BLOOD URINE, POC: NORMAL
CASTS: NORMAL
COLOR, POC UA: YELLOW
CRYSTALS: NORMAL
GLUCOSE UR QL STRIP: NORMAL
KETONES UR QL STRIP: NORMAL
LEUKOCYTE ESTERASE URINE, POC: NORMAL
NITRITE, POC UA: NORMAL
PH, POC UA: 6
PROTEIN, POC: 100
RBC CELLS COUNTED: NORMAL
SPECIFIC GRAVITY, POC UA: 1.01
UROBILINOGEN, POC UA: NORMAL
WHITE BLOOD CELLS: NORMAL

## 2022-05-19 DIAGNOSIS — I10 HTN (HYPERTENSION): ICD-10-CM

## 2022-06-10 NOTE — TELEPHONE ENCOUNTER
Refill Routing Note   Medication(s) are not appropriate for processing by [facility] for the following reason(s):      - Required indication for medication not on problem list ( )    [facility] action(s):  Defer Medication-related problems identified: Requires labs        Medication reconciliation completed: No     Appointments  past 12m or future 3m with PCP    Date Provider   Last Visit   4/27/2022 Jaison Tesfaye MD   Next Visit   Visit date not found Jaison Tesfaye MD   ED visits in past 90 days: 0        Note composed:9:55 AM 06/10/2022

## 2022-06-10 NOTE — TELEPHONE ENCOUNTER
Care Due:                  Date            Visit Type   Department     Provider  --------------------------------------------------------------------------------                                EP -                              PRIMARY      [facility] FAMILY  Last Visit: 04-      CARE ([facility])   MEDICINE       Jaison Tesfaye  Next Visit: None Scheduled  None         None Found                                                            Last  Test          Frequency    Reason                     Performed    Due Date  --------------------------------------------------------------------------------    CBC.........  12 months..  allopurinoL..............  Not Found    Overdue    CMP.........  12 months..  allopurinoL, colchicine..  Not Found    Overdue    Uric Acid...  12 months..  allopurinoL, colchicine..  Not Found    Overdue    [employer] Embedded Care Gaps. Reference number: [id number]. 6/10/2022   12:17:18 AM CDT

## 2022-06-12 RX ORDER — ESOMEPRAZOLE MAGNESIUM 40 MG/1
CAPSULE, DELAYED RELEASE ORAL
Qty: 90 CAPSULE | Refills: 3 | Status: SHIPPED | OUTPATIENT
Start: 2022-06-12 | End: 2023-08-07 | Stop reason: SDUPTHER

## 2022-07-09 RX ORDER — MONTELUKAST SODIUM 10 MG/1
TABLET ORAL
Qty: 90 TABLET | Refills: 3 | Status: SHIPPED | OUTPATIENT
Start: 2022-07-09 | End: 2023-12-26 | Stop reason: SDUPTHER

## 2022-07-09 NOTE — TELEPHONE ENCOUNTER
No new care gaps identified.  [facility] Embedded Care Gaps. Reference number: [id number]. 7/09/2022   12:16:06 AM JOCELYNNT

## 2022-07-09 NOTE — TELEPHONE ENCOUNTER
Refill Decision Note   Shawn Hand  is requesting a refill authorization.  Brief Assessment and Rationale for Refill:  Approve     Medication Therapy Plan:       Medication Reconciliation Completed: No   Comments:     No Care Gaps recommended.     Note composed:6:18 PM 07/09/2022

## 2022-07-21 ENCOUNTER — TELEPHONE (OUTPATIENT)
Dept: FAMILY MEDICINE | Facility: CLINIC | Age: 67
End: 2022-07-21
Payer: COMMERCIAL

## 2022-07-21 NOTE — TELEPHONE ENCOUNTER
Spoke to pt's wife (Sarah) appt scheduled for 07/22 at 1445 with Sophia. Wife verbalized understanding.

## 2022-07-21 NOTE — TELEPHONE ENCOUNTER
----- Message from Roman Barker sent at 2022  8:06 AM CDT -----  Contact: Wife - Sarah Hand  MRN: [id number]  : 1955  PCP: Jaison Tesfaye  Home Phone      [phone]  Work Phone      Not on file.  Mobile          [phone]      MESSAGE: feverish, achy, nasal congestion -- neg home Covid test -- requesting appt today    Call Sarah @ [phone]    PCP: Mera

## 2022-07-22 ENCOUNTER — CLINICAL SUPPORT (OUTPATIENT)
Dept: FAMILY MEDICINE | Facility: CLINIC | Age: 67
End: 2022-07-22
Payer: COMMERCIAL

## 2022-07-22 ENCOUNTER — OFFICE VISIT (OUTPATIENT)
Dept: FAMILY MEDICINE | Facility: CLINIC | Age: 67
End: 2022-07-22
Payer: COMMERCIAL

## 2022-07-22 VITALS
HEIGHT: 70 IN | RESPIRATION RATE: 12 BRPM | OXYGEN SATURATION: 96 % | DIASTOLIC BLOOD PRESSURE: 82 MMHG | WEIGHT: 309.88 LBS | HEART RATE: 80 BPM | BODY MASS INDEX: 44.36 KG/M2 | SYSTOLIC BLOOD PRESSURE: 128 MMHG

## 2022-07-22 DIAGNOSIS — R05.9 COUGH: ICD-10-CM

## 2022-07-22 DIAGNOSIS — H65.03 BILATERAL ACUTE SEROUS OTITIS MEDIA, RECURRENCE NOT SPECIFIED: ICD-10-CM

## 2022-07-22 DIAGNOSIS — R05.9 COUGH: Primary | ICD-10-CM

## 2022-07-22 DIAGNOSIS — J32.9 SINUSITIS, UNSPECIFIED CHRONICITY, UNSPECIFIED LOCATION: ICD-10-CM

## 2022-07-22 LAB
CTP QC/QA: YES
SARS-COV-2 RDRP RESP QL NAA+PROBE: NEGATIVE

## 2022-07-22 PROCEDURE — 1160F PR REVIEW ALL MEDS BY PRESCRIBER/CLIN PHARMACIST DOCUMENTED: ICD-10-PCS | Mod: CPTII,S$GLB,, | Performed by: NURSE PRACTITIONER

## 2022-07-22 PROCEDURE — 3079F PR MOST RECENT DIASTOLIC BLOOD PRESSURE 80-89 MM HG: ICD-10-PCS | Mod: CPTII,S$GLB,, | Performed by: NURSE PRACTITIONER

## 2022-07-22 PROCEDURE — 1125F AMNT PAIN NOTED PAIN PRSNT: CPT | Mod: CPTII,S$GLB,, | Performed by: NURSE PRACTITIONER

## 2022-07-22 PROCEDURE — 3074F SYST BP LT 130 MM HG: CPT | Mod: CPTII,S$GLB,, | Performed by: NURSE PRACTITIONER

## 2022-07-22 PROCEDURE — 3079F DIAST BP 80-89 MM HG: CPT | Mod: CPTII,S$GLB,, | Performed by: NURSE PRACTITIONER

## 2022-07-22 PROCEDURE — 1159F MED LIST DOCD IN RCRD: CPT | Mod: CPTII,S$GLB,, | Performed by: NURSE PRACTITIONER

## 2022-07-22 PROCEDURE — 3008F PR BODY MASS INDEX (BMI) DOCUMENTED: ICD-10-PCS | Mod: CPTII,S$GLB,, | Performed by: NURSE PRACTITIONER

## 2022-07-22 PROCEDURE — U0002 COVID-19 LAB TEST NON-CDC: HCPCS | Mod: QW,S$GLB,, | Performed by: NURSE PRACTITIONER

## 2022-07-22 PROCEDURE — 99213 OFFICE O/P EST LOW 20 MIN: CPT | Mod: 25,S$GLB,, | Performed by: NURSE PRACTITIONER

## 2022-07-22 PROCEDURE — U0002: ICD-10-PCS | Mod: QW,S$GLB,, | Performed by: NURSE PRACTITIONER

## 2022-07-22 PROCEDURE — 99999 PR PBB SHADOW E&M-EST. PATIENT-LVL IV: CPT | Mod: PBBFAC,,, | Performed by: NURSE PRACTITIONER

## 2022-07-22 PROCEDURE — 96372 PR INJECTION,THERAP/PROPH/DIAG2ST, IM OR SUBCUT: ICD-10-PCS | Mod: S$GLB,,, | Performed by: NURSE PRACTITIONER

## 2022-07-22 PROCEDURE — 1101F PT FALLS ASSESS-DOCD LE1/YR: CPT | Mod: CPTII,S$GLB,, | Performed by: NURSE PRACTITIONER

## 2022-07-22 PROCEDURE — 1159F PR MEDICATION LIST DOCUMENTED IN MEDICAL RECORD: ICD-10-PCS | Mod: CPTII,S$GLB,, | Performed by: NURSE PRACTITIONER

## 2022-07-22 PROCEDURE — 1160F RVW MEDS BY RX/DR IN RCRD: CPT | Mod: CPTII,S$GLB,, | Performed by: NURSE PRACTITIONER

## 2022-07-22 PROCEDURE — 99213 PR OFFICE/OUTPT VISIT, EST, LEVL III, 20-29 MIN: ICD-10-PCS | Mod: 25,S$GLB,, | Performed by: NURSE PRACTITIONER

## 2022-07-22 PROCEDURE — 1101F PR PT FALLS ASSESS DOC 0-1 FALLS W/OUT INJ PAST YR: ICD-10-PCS | Mod: CPTII,S$GLB,, | Performed by: NURSE PRACTITIONER

## 2022-07-22 PROCEDURE — 3288F FALL RISK ASSESSMENT DOCD: CPT | Mod: CPTII,S$GLB,, | Performed by: NURSE PRACTITIONER

## 2022-07-22 PROCEDURE — 3074F PR MOST RECENT SYSTOLIC BLOOD PRESSURE < 130 MM HG: ICD-10-PCS | Mod: CPTII,S$GLB,, | Performed by: NURSE PRACTITIONER

## 2022-07-22 PROCEDURE — 99999 PR PBB SHADOW E&M-EST. PATIENT-LVL IV: ICD-10-PCS | Mod: PBBFAC,,, | Performed by: NURSE PRACTITIONER

## 2022-07-22 PROCEDURE — 1125F PR PAIN SEVERITY QUANTIFIED, PAIN PRESENT: ICD-10-PCS | Mod: CPTII,S$GLB,, | Performed by: NURSE PRACTITIONER

## 2022-07-22 PROCEDURE — 3288F PR FALLS RISK ASSESSMENT DOCUMENTED: ICD-10-PCS | Mod: CPTII,S$GLB,, | Performed by: NURSE PRACTITIONER

## 2022-07-22 PROCEDURE — 3008F BODY MASS INDEX DOCD: CPT | Mod: CPTII,S$GLB,, | Performed by: NURSE PRACTITIONER

## 2022-07-22 PROCEDURE — 96372 THER/PROPH/DIAG INJ SC/IM: CPT | Mod: S$GLB,,, | Performed by: NURSE PRACTITIONER

## 2022-07-22 RX ORDER — TRIAMCINOLONE ACETONIDE 40 MG/ML
60 INJECTION, SUSPENSION INTRA-ARTICULAR; INTRAMUSCULAR
Status: COMPLETED | OUTPATIENT
Start: 2022-07-22 | End: 2022-07-22

## 2022-07-22 RX ORDER — DILTIAZEM HYDROCHLORIDE 240 MG/1
240 CAPSULE, EXTENDED RELEASE ORAL DAILY
COMMUNITY
Start: 2022-07-10

## 2022-07-22 RX ORDER — AZITHROMYCIN 500 MG/1
500 TABLET, FILM COATED ORAL DAILY
Qty: 3 TABLET | Refills: 0 | Status: SHIPPED | OUTPATIENT
Start: 2022-07-22 | End: 2024-02-05

## 2022-07-22 RX ADMIN — TRIAMCINOLONE ACETONIDE 60 MG: 40 INJECTION, SUSPENSION INTRA-ARTICULAR; INTRAMUSCULAR at 03:07

## 2022-07-22 NOTE — PROGRESS NOTES
Subjective:       Patient ID: Shawn Hand is a 66 y.o. male.    Chief Complaint: Headache, Fatigue, Sore Throat (Scratchy throat), Generalized Body Aches, Nasal Congestion, and Cough (All started tuesday)    Here with symptoms for 4 days.     Headache   This is a new problem. The current episode started in the past 7 days. The pain is located in the frontal region. The pain quality is similar to prior headaches. The quality of the pain is described as aching. Associated symptoms include coughing, rhinorrhea and a sore throat. Pertinent negatives include no abdominal pain, ear pain, fever, nausea or vomiting.   Fatigue  This is a new problem. The current episode started in the past 7 days. Associated symptoms include congestion, coughing, fatigue, headaches and a sore throat. Pertinent negatives include no abdominal pain, fever, nausea, rash or vomiting.   Sore Throat   This is a new problem. The current episode started in the past 7 days. Associated symptoms include congestion, coughing and headaches. Pertinent negatives include no abdominal pain, diarrhea, ear pain, shortness of breath or vomiting.   Cough  This is a new problem. The current episode started in the past 7 days. The cough is non-productive. Associated symptoms include headaches, nasal congestion, rhinorrhea and a sore throat. Pertinent negatives include no ear pain, fever, rash, shortness of breath or wheezing.     Review of Systems   Constitutional: Positive for fatigue. Negative for appetite change and fever.   HENT: Positive for congestion, rhinorrhea and sore throat. Negative for ear pain.    Eyes: Negative.  Negative for visual disturbance.   Respiratory: Positive for cough. Negative for shortness of breath and wheezing.    Cardiovascular: Negative.    Gastrointestinal: Negative.  Negative for abdominal pain, diarrhea, nausea and vomiting.   Genitourinary: Negative.  Negative for difficulty urinating.   Musculoskeletal: Negative.     Skin: Negative.  Negative for rash.   Neurological: Positive for headaches.   Psychiatric/Behavioral: Negative.  Negative for sleep disturbance. The patient is not nervous/anxious.    All other systems reviewed and are negative.      Objective:      Physical Exam  Vitals and nursing note reviewed.   Constitutional:       Appearance: Normal appearance. He is well-developed.   HENT:      Head: Normocephalic and atraumatic.      Right Ear: Tympanic membrane and external ear normal.      Left Ear: Tympanic membrane and external ear normal.      Nose: Mucosal edema, congestion and rhinorrhea present.      Mouth/Throat:      Mouth: Mucous membranes are moist.      Pharynx: Uvula midline. Posterior oropharyngeal erythema present.   Eyes:      Conjunctiva/sclera: Conjunctivae normal.   Neck:      Thyroid: No thyromegaly.      Trachea: Trachea normal.   Cardiovascular:      Rate and Rhythm: Normal rate and regular rhythm.      Pulses: Normal pulses.      Heart sounds: Normal heart sounds, S1 normal and S2 normal. No murmur heard.  Pulmonary:      Effort: Pulmonary effort is normal. No respiratory distress.      Breath sounds: Wheezing present.   Abdominal:      Palpations: Abdomen is soft.   Musculoskeletal:         General: Normal range of motion.      Cervical back: Normal range of motion and neck supple.   Lymphadenopathy:      Cervical: No cervical adenopathy.   Skin:     General: Skin is warm and dry.   Neurological:      Mental Status: He is alert and oriented to person, place, and time.   Psychiatric:         Mood and Affect: Mood normal.         Speech: Speech normal.         Assessment:       1. Cough    2. Sinusitis, unspecified chronicity, unspecified location    3. Bilateral acute serous otitis media, recurrence not specified        Plan:   Shawn HANNAH Sr was seen today for headache, fatigue, sore throat, generalized body aches, nasal congestion and cough.    Diagnoses and all orders for this visit:    Cough  -      POCT COVID-19 Rapid Screening - negative    Sinusitis, unspecified chronicity, unspecified location  -     triamcinolone acetonide injection 60 mg    Bilateral acute serous otitis media, recurrence not specified  -     azithromycin (ZITHROMAX) 500 MG tablet; Take 1 tablet (500 mg total) by mouth once daily.    RTC PRN

## 2022-08-31 ENCOUNTER — CLINICAL SUPPORT (OUTPATIENT)
Dept: FAMILY MEDICINE | Facility: CLINIC | Age: 67
End: 2022-08-31
Payer: COMMERCIAL

## 2022-08-31 ENCOUNTER — OFFICE VISIT (OUTPATIENT)
Dept: FAMILY MEDICINE | Facility: CLINIC | Age: 67
End: 2022-08-31
Payer: COMMERCIAL

## 2022-08-31 VITALS
HEART RATE: 80 BPM | DIASTOLIC BLOOD PRESSURE: 80 MMHG | RESPIRATION RATE: 12 BRPM | WEIGHT: 308.31 LBS | SYSTOLIC BLOOD PRESSURE: 138 MMHG | BODY MASS INDEX: 44.14 KG/M2 | HEIGHT: 70 IN

## 2022-08-31 DIAGNOSIS — J40 BRONCHITIS: ICD-10-CM

## 2022-08-31 DIAGNOSIS — J44.1 CHRONIC OBSTRUCTIVE PULMONARY DISEASE WITH ACUTE EXACERBATION: ICD-10-CM

## 2022-08-31 DIAGNOSIS — R05.9 COUGH: Primary | ICD-10-CM

## 2022-08-31 LAB
CTP QC/QA: YES
SARS-COV-2 RDRP RESP QL NAA+PROBE: NEGATIVE

## 2022-08-31 PROCEDURE — 3008F BODY MASS INDEX DOCD: CPT | Mod: CPTII,S$GLB,, | Performed by: NURSE PRACTITIONER

## 2022-08-31 PROCEDURE — 1126F PR PAIN SEVERITY QUANTIFIED, NO PAIN PRESENT: ICD-10-PCS | Mod: CPTII,S$GLB,, | Performed by: NURSE PRACTITIONER

## 2022-08-31 PROCEDURE — 3075F SYST BP GE 130 - 139MM HG: CPT | Mod: CPTII,S$GLB,, | Performed by: NURSE PRACTITIONER

## 2022-08-31 PROCEDURE — 3079F DIAST BP 80-89 MM HG: CPT | Mod: CPTII,S$GLB,, | Performed by: NURSE PRACTITIONER

## 2022-08-31 PROCEDURE — 99213 OFFICE O/P EST LOW 20 MIN: CPT | Mod: 25,S$GLB,, | Performed by: NURSE PRACTITIONER

## 2022-08-31 PROCEDURE — 99213 PR OFFICE/OUTPT VISIT, EST, LEVL III, 20-29 MIN: ICD-10-PCS | Mod: 25,S$GLB,, | Performed by: NURSE PRACTITIONER

## 2022-08-31 PROCEDURE — 3008F PR BODY MASS INDEX (BMI) DOCUMENTED: ICD-10-PCS | Mod: CPTII,S$GLB,, | Performed by: NURSE PRACTITIONER

## 2022-08-31 PROCEDURE — 99999 PR PBB SHADOW E&M-EST. PATIENT-LVL IV: ICD-10-PCS | Mod: PBBFAC,,, | Performed by: NURSE PRACTITIONER

## 2022-08-31 PROCEDURE — 1159F PR MEDICATION LIST DOCUMENTED IN MEDICAL RECORD: ICD-10-PCS | Mod: CPTII,S$GLB,, | Performed by: NURSE PRACTITIONER

## 2022-08-31 PROCEDURE — 96372 THER/PROPH/DIAG INJ SC/IM: CPT | Mod: S$GLB,,, | Performed by: NURSE PRACTITIONER

## 2022-08-31 PROCEDURE — 3288F PR FALLS RISK ASSESSMENT DOCUMENTED: ICD-10-PCS | Mod: CPTII,S$GLB,, | Performed by: NURSE PRACTITIONER

## 2022-08-31 PROCEDURE — U0002 COVID-19 LAB TEST NON-CDC: HCPCS | Mod: QW,S$GLB,, | Performed by: NURSE PRACTITIONER

## 2022-08-31 PROCEDURE — 3079F PR MOST RECENT DIASTOLIC BLOOD PRESSURE 80-89 MM HG: ICD-10-PCS | Mod: CPTII,S$GLB,, | Performed by: NURSE PRACTITIONER

## 2022-08-31 PROCEDURE — 1101F PR PT FALLS ASSESS DOC 0-1 FALLS W/OUT INJ PAST YR: ICD-10-PCS | Mod: CPTII,S$GLB,, | Performed by: NURSE PRACTITIONER

## 2022-08-31 PROCEDURE — 1160F PR REVIEW ALL MEDS BY PRESCRIBER/CLIN PHARMACIST DOCUMENTED: ICD-10-PCS | Mod: CPTII,S$GLB,, | Performed by: NURSE PRACTITIONER

## 2022-08-31 PROCEDURE — 1160F RVW MEDS BY RX/DR IN RCRD: CPT | Mod: CPTII,S$GLB,, | Performed by: NURSE PRACTITIONER

## 2022-08-31 PROCEDURE — 3288F FALL RISK ASSESSMENT DOCD: CPT | Mod: CPTII,S$GLB,, | Performed by: NURSE PRACTITIONER

## 2022-08-31 PROCEDURE — U0002: ICD-10-PCS | Mod: QW,S$GLB,, | Performed by: NURSE PRACTITIONER

## 2022-08-31 PROCEDURE — 99999 PR PBB SHADOW E&M-EST. PATIENT-LVL IV: CPT | Mod: PBBFAC,,, | Performed by: NURSE PRACTITIONER

## 2022-08-31 PROCEDURE — 3075F PR MOST RECENT SYSTOLIC BLOOD PRESS GE 130-139MM HG: ICD-10-PCS | Mod: CPTII,S$GLB,, | Performed by: NURSE PRACTITIONER

## 2022-08-31 PROCEDURE — 1101F PT FALLS ASSESS-DOCD LE1/YR: CPT | Mod: CPTII,S$GLB,, | Performed by: NURSE PRACTITIONER

## 2022-08-31 PROCEDURE — 96372 PR INJECTION,THERAP/PROPH/DIAG2ST, IM OR SUBCUT: ICD-10-PCS | Mod: S$GLB,,, | Performed by: NURSE PRACTITIONER

## 2022-08-31 PROCEDURE — 1159F MED LIST DOCD IN RCRD: CPT | Mod: CPTII,S$GLB,, | Performed by: NURSE PRACTITIONER

## 2022-08-31 PROCEDURE — 1126F AMNT PAIN NOTED NONE PRSNT: CPT | Mod: CPTII,S$GLB,, | Performed by: NURSE PRACTITIONER

## 2022-08-31 RX ORDER — ALBUTEROL SULFATE 0.83 MG/ML
2.5 SOLUTION RESPIRATORY (INHALATION) EVERY 6 HOURS PRN
Qty: 120 EACH | Refills: 5 | Status: SHIPPED | OUTPATIENT
Start: 2022-08-31 | End: 2023-08-31

## 2022-08-31 RX ORDER — AZITHROMYCIN 500 MG/1
500 TABLET, FILM COATED ORAL DAILY
Qty: 3 TABLET | Refills: 0 | Status: SHIPPED | OUTPATIENT
Start: 2022-08-31 | End: 2024-02-05

## 2022-08-31 RX ORDER — TRIAMCINOLONE ACETONIDE 40 MG/ML
60 INJECTION, SUSPENSION INTRA-ARTICULAR; INTRAMUSCULAR
Status: COMPLETED | OUTPATIENT
Start: 2022-08-31 | End: 2022-08-31

## 2022-08-31 RX ADMIN — TRIAMCINOLONE ACETONIDE 60 MG: 40 INJECTION, SUSPENSION INTRA-ARTICULAR; INTRAMUSCULAR at 04:08

## 2022-08-31 NOTE — PROGRESS NOTES
Subjective:       Patient ID: Shawn Hand is a 66 y.o. male.    Chief Complaint: Cough, Nasal Congestion, and Sore Throat (All started Monday, states their are other people at work with it also)    Sore throat, congestion and cough for a few days.    Review of Systems   Constitutional: Negative.  Negative for appetite change, fatigue and fever.   HENT:  Positive for congestion. Negative for ear pain and sore throat.    Eyes: Negative.  Negative for visual disturbance.   Respiratory:  Positive for cough and wheezing. Negative for shortness of breath.    Cardiovascular: Negative.    Gastrointestinal: Negative.  Negative for abdominal pain, diarrhea, nausea and vomiting.   Genitourinary: Negative.  Negative for difficulty urinating.   Musculoskeletal: Negative.    Skin: Negative.  Negative for rash.   Neurological: Negative.  Negative for headaches.   Psychiatric/Behavioral: Negative.  Negative for sleep disturbance. The patient is not nervous/anxious.    All other systems reviewed and are negative.    Objective:      Physical Exam  Vitals and nursing note reviewed.   Constitutional:       Appearance: Normal appearance. He is well-developed.   HENT:      Head: Normocephalic and atraumatic.      Right Ear: Tympanic membrane and external ear normal.      Left Ear: Tympanic membrane and external ear normal.      Nose: Mucosal edema, congestion and rhinorrhea present.      Mouth/Throat:      Mouth: Mucous membranes are moist.      Pharynx: Uvula midline.   Eyes:      Conjunctiva/sclera: Conjunctivae normal.   Neck:      Thyroid: No thyromegaly.      Trachea: Trachea normal.   Cardiovascular:      Rate and Rhythm: Normal rate. Rhythm irregular.      Heart sounds: S1 normal and S2 normal. Murmur heard.   Pulmonary:      Effort: Pulmonary effort is normal. No respiratory distress.      Breath sounds: Wheezing and rhonchi present.   Abdominal:      Palpations: Abdomen is soft.   Musculoskeletal:         General: Normal  range of motion.      Cervical back: Normal range of motion and neck supple.   Lymphadenopathy:      Cervical: No cervical adenopathy.   Skin:     General: Skin is warm and dry.   Neurological:      Mental Status: He is alert and oriented to person, place, and time.   Psychiatric:         Mood and Affect: Mood normal.         Speech: Speech normal.       Assessment:       1. Cough    2. Bronchitis    3. Chronic obstructive pulmonary disease with acute exacerbation          Plan:   Shawn HANNAH Sr was seen today for cough, nasal congestion and sore throat.    Diagnoses and all orders for this visit:    Cough  -     POCT COVID-19 Rapid Screening - negative    Bronchitis  -     albuterol (PROVENTIL) 2.5 mg /3 mL (0.083 %) nebulizer solution; Take 3 mLs (2.5 mg total) by nebulization every 6 (six) hours as needed for Wheezing. Rescue  -     azithromycin (ZITHROMAX) 500 MG tablet; Take 1 tablet (500 mg total) by mouth once daily.  -     triamcinolone acetonide injection 60 mg    Chronic obstructive pulmonary disease with acute exacerbation  -     albuterol (PROVENTIL) 2.5 mg /3 mL (0.083 %) nebulizer solution; Take 3 mLs (2.5 mg total) by nebulization every 6 (six) hours as needed for Wheezing. Rescue  -     azithromycin (ZITHROMAX) 500 MG tablet; Take 1 tablet (500 mg total) by mouth once daily.  -     triamcinolone acetonide injection 60 mg    RTC PRN

## 2022-09-06 ENCOUNTER — TELEPHONE (OUTPATIENT)
Dept: FAMILY MEDICINE | Facility: CLINIC | Age: 67
End: 2022-09-06
Payer: COMMERCIAL

## 2022-09-06 RX ORDER — METHYLPREDNISOLONE 4 MG/1
TABLET ORAL
Qty: 1 EACH | Refills: 0 | Status: SHIPPED | OUTPATIENT
Start: 2022-09-06 | End: 2023-08-07

## 2022-09-06 RX ORDER — PROMETHAZINE HYDROCHLORIDE AND DEXTROMETHORPHAN HYDROBROMIDE 6.25; 15 MG/5ML; MG/5ML
5 SYRUP ORAL EVERY 6 HOURS PRN
Qty: 180 ML | Refills: 0 | Status: SHIPPED | OUTPATIENT
Start: 2022-09-06 | End: 2022-09-16

## 2022-09-06 RX ORDER — ALBUTEROL SULFATE 90 UG/1
2 AEROSOL, METERED RESPIRATORY (INHALATION) EVERY 6 HOURS PRN
Qty: 18 G | Refills: 5 | Status: SHIPPED | OUTPATIENT
Start: 2022-09-06 | End: 2023-12-11 | Stop reason: SDUPTHER

## 2022-09-06 NOTE — TELEPHONE ENCOUNTER
Spoke to pt's spouse (Sarah), states pt was seen on 08/31 by  for cough, congestion. States azithromycin abx sent for 3 days has not helped. States pt is still having congestion, cough, and is short winded.Pt advised of no available appt's today but states pt only wants to see Dr. Tesfaye and requesting is something else can be given.     Please advise. Thanks

## 2022-09-06 NOTE — TELEPHONE ENCOUNTER
----- Message from Roman Barker sent at 2022  8:42 AM CDT -----  Contact: Wife - Sarah Hand  MRN: [id number]  : 1955  PCP: Jaison Tesfaye  Home Phone      [phone]  Work Phone      Not on file.  Mobile          [phone]      MESSAGE: saw Sophia last week - continues with URI symptoms -- requesting appt tjis week with Dr Tesfaye    Call Sarah @ [phone]    PCP: Mera

## 2022-09-07 NOTE — TELEPHONE ENCOUNTER
Pt's spouse (Sarah) notified and verbalized understanding. Instructed to contact clinic if s/s worsen.

## 2022-09-26 RX ORDER — BUDESONIDE AND FORMOTEROL FUMARATE DIHYDRATE 160; 4.5 UG/1; UG/1
AEROSOL RESPIRATORY (INHALATION)
Qty: 30.6 G | Refills: 2 | Status: SHIPPED | OUTPATIENT
Start: 2022-09-26 | End: 2022-09-26

## 2022-09-26 NOTE — TELEPHONE ENCOUNTER
Care Due:                  Date            Visit Type   Department     Provider  --------------------------------------------------------------------------------                                EP -                              PRIMARY      [facility]  Last Visit: 04-      CARE ([facility])   MEDICINE       Jaison Tesfaye  Next Visit: None Scheduled  None         None Found                                                            Last  Test          Frequency    Reason                     Performed    Due Date  --------------------------------------------------------------------------------    CBC.........  12 months..  allopurinoL..............  Not Found    Overdue    CMP.........  12 months..  allopurinoL, colchicine..  Not Found    Overdue    Uric Acid...  12 months..  allopurinoL, colchicine..  Not Found    Overdue    [employer] Embedded Care Gaps. Reference number: [id number]. 9/26/2022   12:15:09 AM CDT

## 2022-09-26 NOTE — TELEPHONE ENCOUNTER
Refill Decision Note   Shawn Badillodaro  is requesting a refill authorization.  Brief Assessment and Rationale for Refill:  Approve    -Medication-Related Problems Identified: Requires labs  Medication Therapy Plan:       Medication Reconciliation Completed: No   Comments:     Provider Staff:     Action is required for this patient.   Please see care gap opportunities below in Care Due Message.     Thanks!  [facility]     Appointments      Date Provider   Last Visit   4/27/2022 Jaison Tesfaye MD   Next Visit   Visit date not found Jaison Tesfaye MD     Note composed:8:44 AM 09/26/2022           Note composed:8:44 AM 09/26/2022

## 2022-11-09 ENCOUNTER — TELEPHONE (OUTPATIENT)
Dept: FAMILY MEDICINE | Facility: CLINIC | Age: 67
End: 2022-11-09
Payer: COMMERCIAL

## 2022-11-09 DIAGNOSIS — J43.9 PULMONARY EMPHYSEMA, UNSPECIFIED EMPHYSEMA TYPE: Primary | ICD-10-CM

## 2022-11-09 RX ORDER — BUDESONIDE AND FORMOTEROL FUMARATE DIHYDRATE 160; 4.5 UG/1; UG/1
2 AEROSOL RESPIRATORY (INHALATION) EVERY 12 HOURS
Qty: 10.2 G | Refills: 5 | Status: SHIPPED | OUTPATIENT
Start: 2022-11-09 | End: 2023-06-07 | Stop reason: SDUPTHER

## 2022-11-09 NOTE — TELEPHONE ENCOUNTER
Spoke to  patients wife, she stated last time Dr Tesfaye called in Dulera instead of Symbicort. Patient would like his Symbicort sent in .    Medication not on current medication list.      Please advise

## 2022-11-10 NOTE — TELEPHONE ENCOUNTER
Attempted to contact patient about medication, NA, LVM  If patient calls back please inform that Symbicort was sent to the pharmacy for him.

## 2023-06-07 DIAGNOSIS — J43.9 PULMONARY EMPHYSEMA, UNSPECIFIED EMPHYSEMA TYPE: ICD-10-CM

## 2023-06-07 RX ORDER — BUDESONIDE AND FORMOTEROL FUMARATE DIHYDRATE 160; 4.5 UG/1; UG/1
2 AEROSOL RESPIRATORY (INHALATION) EVERY 12 HOURS
Qty: 10.2 G | Refills: 5 | Status: SHIPPED | OUTPATIENT
Start: 2023-06-07 | End: 2023-12-11 | Stop reason: SDUPTHER

## 2023-06-07 NOTE — TELEPHONE ENCOUNTER
Pt is requesting a refill of his symbicort     LOV 8/22- I have made a yearly appt for him on 8/7/23        I have pended it if you want to go ahead     He wants it to go to [facility] in [place].

## 2023-06-07 NOTE — TELEPHONE ENCOUNTER
Care Due:                  Date            Visit Type   Department     Provider  --------------------------------------------------------------------------------                                EP -                              [facility]  Last Visit: 04-      CARE ([facility])   LEE Tesfaye                              EP -                              PRIMARY      [facility]  Next Visit: 08-      CARE ([facility])   LEE Tesfaye                                                            Last  Test          Frequency    Reason                     Performed    Due Date  --------------------------------------------------------------------------------    CBC.........  12 months..  allopurinoL..............  Not Found    Overdue    CMP.........  12 months..  allopurinoL, colchicine..  Not Found    Overdue    Uric Acid...  12 months..  allopurinoL, colchicine..  Not Found    Overdue    [employer] Embedded Care Due Messages. Reference number: [id number].   6/07/2023 11:32:42 AM CDT

## 2023-06-07 NOTE — TELEPHONE ENCOUNTER
----- Message from Roman Barker sent at 2023 10:35 AM CDT -----  Contact: Wife - Sarah Hand  MRN: [id number]  : 1955  PCP: Jaison Tesfaye  Home Phone      [phone]  Work Phone      Not on file.  Mobile          [phone]      MESSAGE:   Pt requesting refill or new Rx.   Is this a refill or new RX:  refill  RX name and strength: Symbicort inhaler  Last office visit: 22  Is this a 30-day or 90-day RX:  30 day  Pharmacy name and location:  [facility] [place]  Comments:      Phone:  Sarah @ [phone]    PCP: Mera

## 2023-06-07 NOTE — TELEPHONE ENCOUNTER
Pt is requesting a refill of his symbicort    LOV 8/22- I have made a yearly appt for him on 8/7/23      I have pended it if you want to go ahead and fill if or I can make

## 2023-07-19 DIAGNOSIS — I10 HTN (HYPERTENSION): ICD-10-CM

## 2023-08-07 ENCOUNTER — OFFICE VISIT (OUTPATIENT)
Dept: FAMILY MEDICINE | Facility: CLINIC | Age: 68
End: 2023-08-07
Payer: COMMERCIAL

## 2023-08-07 VITALS
HEIGHT: 70 IN | BODY MASS INDEX: 44.96 KG/M2 | HEART RATE: 100 BPM | OXYGEN SATURATION: 96 % | WEIGHT: 314.06 LBS | RESPIRATION RATE: 16 BRPM | SYSTOLIC BLOOD PRESSURE: 132 MMHG | DIASTOLIC BLOOD PRESSURE: 84 MMHG

## 2023-08-07 DIAGNOSIS — R05.9 COUGH, UNSPECIFIED TYPE: ICD-10-CM

## 2023-08-07 DIAGNOSIS — E78.5 DYSLIPIDEMIA: ICD-10-CM

## 2023-08-07 DIAGNOSIS — J40 BRONCHITIS: ICD-10-CM

## 2023-08-07 DIAGNOSIS — E66.01 MORBID OBESITY: ICD-10-CM

## 2023-08-07 DIAGNOSIS — Z12.5 SCREENING FOR PROSTATE CANCER: ICD-10-CM

## 2023-08-07 DIAGNOSIS — I48.19 PERSISTENT ATRIAL FIBRILLATION: ICD-10-CM

## 2023-08-07 DIAGNOSIS — I10 PRIMARY HYPERTENSION: Primary | ICD-10-CM

## 2023-08-07 PROCEDURE — 96372 THER/PROPH/DIAG INJ SC/IM: CPT | Mod: S$GLB,,, | Performed by: FAMILY MEDICINE

## 2023-08-07 PROCEDURE — 99999 PR PBB SHADOW E&M-EST. PATIENT-LVL IV: ICD-10-PCS | Mod: PBBFAC,,, | Performed by: FAMILY MEDICINE

## 2023-08-07 PROCEDURE — 1126F PR PAIN SEVERITY QUANTIFIED, NO PAIN PRESENT: ICD-10-PCS | Mod: CPTII,S$GLB,, | Performed by: FAMILY MEDICINE

## 2023-08-07 PROCEDURE — 3008F BODY MASS INDEX DOCD: CPT | Mod: CPTII,S$GLB,, | Performed by: FAMILY MEDICINE

## 2023-08-07 PROCEDURE — 1101F PR PT FALLS ASSESS DOC 0-1 FALLS W/OUT INJ PAST YR: ICD-10-PCS | Mod: CPTII,S$GLB,, | Performed by: FAMILY MEDICINE

## 2023-08-07 PROCEDURE — 3288F PR FALLS RISK ASSESSMENT DOCUMENTED: ICD-10-PCS | Mod: CPTII,S$GLB,, | Performed by: FAMILY MEDICINE

## 2023-08-07 PROCEDURE — 1160F PR REVIEW ALL MEDS BY PRESCRIBER/CLIN PHARMACIST DOCUMENTED: ICD-10-PCS | Mod: CPTII,S$GLB,, | Performed by: FAMILY MEDICINE

## 2023-08-07 PROCEDURE — 3075F SYST BP GE 130 - 139MM HG: CPT | Mod: CPTII,S$GLB,, | Performed by: FAMILY MEDICINE

## 2023-08-07 PROCEDURE — 3075F PR MOST RECENT SYSTOLIC BLOOD PRESS GE 130-139MM HG: ICD-10-PCS | Mod: CPTII,S$GLB,, | Performed by: FAMILY MEDICINE

## 2023-08-07 PROCEDURE — 1101F PT FALLS ASSESS-DOCD LE1/YR: CPT | Mod: CPTII,S$GLB,, | Performed by: FAMILY MEDICINE

## 2023-08-07 PROCEDURE — 3008F PR BODY MASS INDEX (BMI) DOCUMENTED: ICD-10-PCS | Mod: CPTII,S$GLB,, | Performed by: FAMILY MEDICINE

## 2023-08-07 PROCEDURE — 3079F PR MOST RECENT DIASTOLIC BLOOD PRESSURE 80-89 MM HG: ICD-10-PCS | Mod: CPTII,S$GLB,, | Performed by: FAMILY MEDICINE

## 2023-08-07 PROCEDURE — 1160F RVW MEDS BY RX/DR IN RCRD: CPT | Mod: CPTII,S$GLB,, | Performed by: FAMILY MEDICINE

## 2023-08-07 PROCEDURE — 96372 PR INJECTION,THERAP/PROPH/DIAG2ST, IM OR SUBCUT: ICD-10-PCS | Mod: S$GLB,,, | Performed by: FAMILY MEDICINE

## 2023-08-07 PROCEDURE — 3288F FALL RISK ASSESSMENT DOCD: CPT | Mod: CPTII,S$GLB,, | Performed by: FAMILY MEDICINE

## 2023-08-07 PROCEDURE — 1126F AMNT PAIN NOTED NONE PRSNT: CPT | Mod: CPTII,S$GLB,, | Performed by: FAMILY MEDICINE

## 2023-08-07 PROCEDURE — 99214 OFFICE O/P EST MOD 30 MIN: CPT | Mod: 25,S$GLB,, | Performed by: FAMILY MEDICINE

## 2023-08-07 PROCEDURE — 1159F MED LIST DOCD IN RCRD: CPT | Mod: CPTII,S$GLB,, | Performed by: FAMILY MEDICINE

## 2023-08-07 PROCEDURE — 3079F DIAST BP 80-89 MM HG: CPT | Mod: CPTII,S$GLB,, | Performed by: FAMILY MEDICINE

## 2023-08-07 PROCEDURE — 1159F PR MEDICATION LIST DOCUMENTED IN MEDICAL RECORD: ICD-10-PCS | Mod: CPTII,S$GLB,, | Performed by: FAMILY MEDICINE

## 2023-08-07 PROCEDURE — 99999 PR PBB SHADOW E&M-EST. PATIENT-LVL IV: CPT | Mod: PBBFAC,,, | Performed by: FAMILY MEDICINE

## 2023-08-07 PROCEDURE — 99214 PR OFFICE/OUTPT VISIT, EST, LEVL IV, 30-39 MIN: ICD-10-PCS | Mod: 25,S$GLB,, | Performed by: FAMILY MEDICINE

## 2023-08-07 RX ORDER — ESOMEPRAZOLE MAGNESIUM 40 MG/1
40 CAPSULE, DELAYED RELEASE ORAL
Qty: 90 CAPSULE | Refills: 3 | Status: SHIPPED | OUTPATIENT
Start: 2023-08-07

## 2023-08-07 RX ORDER — AZITHROMYCIN 500 MG/1
500 TABLET, FILM COATED ORAL DAILY
Qty: 3 TABLET | Refills: 0 | Status: SHIPPED | OUTPATIENT
Start: 2023-08-07 | End: 2023-08-10

## 2023-08-07 RX ORDER — METHYLPREDNISOLONE ACETATE 40 MG/ML
60 INJECTION, SUSPENSION INTRA-ARTICULAR; INTRALESIONAL; INTRAMUSCULAR; SOFT TISSUE
Status: COMPLETED | OUTPATIENT
Start: 2023-08-07 | End: 2023-08-07

## 2023-08-07 RX ADMIN — METHYLPREDNISOLONE ACETATE 60 MG: 40 INJECTION, SUSPENSION INTRA-ARTICULAR; INTRALESIONAL; INTRAMUSCULAR; SOFT TISSUE at 05:08

## 2023-08-07 NOTE — PROGRESS NOTES
Subjective:       Patient ID: Shawn Hand is a 67 y.o. male.    Chief Complaint: Medication Refill (No current complaints)    Pt is a 67 y.o. male who presents for evaluation and management of   Encounter Diagnoses   Name Primary?    Primary hypertension Yes    Dyslipidemia     Persistent atrial fibrillation     Morbid obesity     Cough, unspecified type     Screening for prostate cancer     Bronchitis    .  Doing well on current meds. Denies any side effects. Prevention is up to date.    Review of Systems   Constitutional:  Negative for fever.   Respiratory:  Positive for cough and wheezing. Negative for shortness of breath.    Cardiovascular:  Negative for chest pain.   Gastrointestinal:  Negative for anal bleeding and blood in stool.   Genitourinary:  Negative for dysuria.   Neurological:  Negative for dizziness and light-headedness.       Objective:      Physical Exam  Constitutional:       Appearance: Normal appearance. He is well-developed. He is obese. He is not ill-appearing.   HENT:      Head: Normocephalic and atraumatic.      Right Ear: External ear normal.      Left Ear: External ear normal.      Nose: Nose normal.      Mouth/Throat:      Mouth: Mucous membranes are moist.      Pharynx: No oropharyngeal exudate or posterior oropharyngeal erythema.   Eyes:      General: No scleral icterus.        Right eye: No discharge.         Left eye: No discharge.      Conjunctiva/sclera: Conjunctivae normal.      Pupils: Pupils are equal, round, and reactive to light.   Neck:      Thyroid: No thyromegaly.      Vascular: No JVD.      Trachea: No tracheal deviation.   Cardiovascular:      Rate and Rhythm: Normal rate and regular rhythm.      Heart sounds: Normal heart sounds. No murmur heard.  Pulmonary:      Effort: Pulmonary effort is normal. No respiratory distress.      Breath sounds: Normal breath sounds. No wheezing or rales.   Chest:      Chest wall: No tenderness.   Abdominal:      General: Bowel  sounds are normal. There is no distension.      Palpations: Abdomen is soft. There is no mass.      Tenderness: There is no abdominal tenderness. There is no guarding or rebound.   Musculoskeletal:         General: Normal range of motion.      Cervical back: Normal range of motion and neck supple.      Right lower leg: No edema.      Left lower leg: No edema.   Lymphadenopathy:      Cervical: No cervical adenopathy.   Skin:     General: Skin is warm and dry.   Neurological:      Mental Status: He is alert and oriented to person, place, and time.      Cranial Nerves: No cranial nerve deficit.      Motor: No abnormal muscle tone.      Coordination: Coordination normal.      Deep Tendon Reflexes: Reflexes are normal and symmetric. Reflexes normal.   Psychiatric:         Behavior: Behavior normal.         Thought Content: Thought content normal.         Judgment: Judgment normal.         Assessment:       1. Primary hypertension    2. Dyslipidemia    3. Persistent atrial fibrillation    4. Morbid obesity    5. Cough, unspecified type    6. Screening for prostate cancer    7. Bronchitis        Plan:   1. Primary hypertension  Overview:  benicar HCT 40/25    Orders:  -     Comprehensive Metabolic Panel; Future; Expected date: 08/07/2023  -     Lipid Panel; Future; Expected date: 08/07/2023  -     TSH; Future; Expected date: 08/07/2023    2. Dyslipidemia  Overview:  Pravastatin   Labs today       3. Persistent atrial fibrillation  Overview:  xarelto   Diltiazem   Sees [facility]       Orders:  -     Comprehensive Metabolic Panel; Future; Expected date: 08/07/2023  -     TSH; Future; Expected date: 08/07/2023    4. Morbid obesity  Overview:  The patient is asked to make an attempt to improve diet and exercise patterns to aid in medical management of this problem.  Cut out white carbs: bread, rice, pasta, potatoes. Exercise/walk 5x/week for at least 30 minutes  .      Orders:  -     Hemoglobin A1C; Future; Expected date:  08/07/2023    5. Cough, unspecified type    6. Screening for prostate cancer  -     PSA, Screening; Future; Expected date: 08/07/2023    7. Bronchitis  -     azithromycin (ZITHROMAX) 500 MG tablet; Take 1 tablet (500 mg total) by mouth once daily. for 3 days  Dispense: 3 tablet; Refill: 0  -     methylPREDNISolone acetate injection 60 mg    Other orders  -     esomeprazole (NEXIUM) 40 MG capsule; Take 1 capsule (40 mg total) by mouth before breakfast.  Dispense: 90 capsule; Refill: 3      No follow-ups on file.

## 2023-08-11 ENCOUNTER — CLINICAL SUPPORT (OUTPATIENT)
Dept: FAMILY MEDICINE | Facility: CLINIC | Age: 68
End: 2023-08-11
Payer: COMMERCIAL

## 2023-08-11 DIAGNOSIS — I10 PRIMARY HYPERTENSION: ICD-10-CM

## 2023-08-11 DIAGNOSIS — E66.01 MORBID OBESITY: ICD-10-CM

## 2023-08-11 DIAGNOSIS — I48.19 PERSISTENT ATRIAL FIBRILLATION: ICD-10-CM

## 2023-08-11 DIAGNOSIS — Z12.5 SCREENING FOR PROSTATE CANCER: ICD-10-CM

## 2023-08-11 LAB
ALBUMIN SERPL BCP-MCNC: 3.7 G/DL (ref 3.5–5.2)
ALP SERPL-CCNC: 48 U/L (ref 55–135)
ALT SERPL W/O P-5'-P-CCNC: 26 U/L (ref 10–44)
ANION GAP SERPL CALC-SCNC: 11 MMOL/L (ref 8–16)
AST SERPL-CCNC: 24 U/L (ref 10–40)
BILIRUB SERPL-MCNC: 0.4 MG/DL (ref 0.1–1)
BUN SERPL-MCNC: 16 MG/DL (ref 8–23)
CALCIUM SERPL-MCNC: 9.8 MG/DL (ref 8.7–10.5)
CHLORIDE SERPL-SCNC: 105 MMOL/L (ref 95–110)
CHOLEST SERPL-MCNC: 128 MG/DL (ref 120–199)
CHOLEST/HDLC SERPL: 3.8 {RATIO} (ref 2–5)
CO2 SERPL-SCNC: 25 MMOL/L (ref 23–29)
COMPLEXED PSA SERPL-MCNC: 0.7 NG/ML (ref 0–4)
CREAT SERPL-MCNC: 1 MG/DL (ref 0.5–1.4)
EST. GFR  (NO RACE VARIABLE): >60 ML/MIN/1.73 M^2
GLUCOSE SERPL-MCNC: 86 MG/DL (ref 70–110)
HDLC SERPL-MCNC: 34 MG/DL (ref 40–75)
HDLC SERPL: 26.6 % (ref 20–50)
LDLC SERPL CALC-MCNC: 76.2 MG/DL (ref 63–159)
NONHDLC SERPL-MCNC: 94 MG/DL
POTASSIUM SERPL-SCNC: 4.4 MMOL/L (ref 3.5–5.1)
PROT SERPL-MCNC: 7.2 G/DL (ref 6–8.4)
SODIUM SERPL-SCNC: 141 MMOL/L (ref 136–145)
TRIGL SERPL-MCNC: 89 MG/DL (ref 30–150)
TSH SERPL DL<=0.005 MIU/L-ACNC: 0.65 UIU/ML (ref 0.4–4)

## 2023-08-11 PROCEDURE — 84153 ASSAY OF PSA TOTAL: CPT | Performed by: FAMILY MEDICINE

## 2023-08-11 PROCEDURE — 80061 LIPID PANEL: CPT | Performed by: FAMILY MEDICINE

## 2023-08-11 PROCEDURE — 80053 COMPREHEN METABOLIC PANEL: CPT | Performed by: FAMILY MEDICINE

## 2023-08-11 PROCEDURE — 36415 COLL VENOUS BLD VENIPUNCTURE: CPT | Mod: S$GLB,,, | Performed by: FAMILY MEDICINE

## 2023-08-11 PROCEDURE — 83036 HEMOGLOBIN GLYCOSYLATED A1C: CPT | Performed by: FAMILY MEDICINE

## 2023-08-11 PROCEDURE — 36415 PR COLLECTION VENOUS BLOOD,VENIPUNCTURE: ICD-10-PCS | Mod: S$GLB,,, | Performed by: FAMILY MEDICINE

## 2023-08-11 PROCEDURE — 84443 ASSAY THYROID STIM HORMONE: CPT | Performed by: FAMILY MEDICINE

## 2023-08-12 LAB
ESTIMATED AVG GLUCOSE: 111 MG/DL (ref 68–131)
HBA1C MFR BLD: 5.5 % (ref 4–5.6)

## 2023-10-27 ENCOUNTER — OFFICE VISIT (OUTPATIENT)
Dept: FAMILY MEDICINE | Facility: CLINIC | Age: 68
End: 2023-10-27
Payer: COMMERCIAL

## 2023-10-27 ENCOUNTER — APPOINTMENT (OUTPATIENT)
Dept: RADIOLOGY | Facility: CLINIC | Age: 68
End: 2023-10-27
Attending: FAMILY MEDICINE
Payer: COMMERCIAL

## 2023-10-27 VITALS
OXYGEN SATURATION: 96 % | HEIGHT: 70 IN | WEIGHT: 315 LBS | SYSTOLIC BLOOD PRESSURE: 136 MMHG | RESPIRATION RATE: 16 BRPM | DIASTOLIC BLOOD PRESSURE: 82 MMHG | BODY MASS INDEX: 45.1 KG/M2 | HEART RATE: 89 BPM

## 2023-10-27 DIAGNOSIS — M25.532 LEFT WRIST PAIN: ICD-10-CM

## 2023-10-27 DIAGNOSIS — M25.532 LEFT WRIST PAIN: Primary | ICD-10-CM

## 2023-10-27 DIAGNOSIS — S66.819A STRAIN OF FLEXOR TENDON OF WRIST: ICD-10-CM

## 2023-10-27 DIAGNOSIS — R29.898 LEFT HAND WEAKNESS: ICD-10-CM

## 2023-10-27 PROCEDURE — 1159F MED LIST DOCD IN RCRD: CPT | Mod: CPTII,S$GLB,, | Performed by: FAMILY MEDICINE

## 2023-10-27 PROCEDURE — 3008F PR BODY MASS INDEX (BMI) DOCUMENTED: ICD-10-PCS | Mod: CPTII,S$GLB,, | Performed by: FAMILY MEDICINE

## 2023-10-27 PROCEDURE — 96372 THER/PROPH/DIAG INJ SC/IM: CPT | Mod: S$GLB,,, | Performed by: FAMILY MEDICINE

## 2023-10-27 PROCEDURE — 3079F DIAST BP 80-89 MM HG: CPT | Mod: CPTII,S$GLB,, | Performed by: FAMILY MEDICINE

## 2023-10-27 PROCEDURE — 1125F PR PAIN SEVERITY QUANTIFIED, PAIN PRESENT: ICD-10-PCS | Mod: CPTII,S$GLB,, | Performed by: FAMILY MEDICINE

## 2023-10-27 PROCEDURE — 3288F PR FALLS RISK ASSESSMENT DOCUMENTED: ICD-10-PCS | Mod: CPTII,S$GLB,, | Performed by: FAMILY MEDICINE

## 2023-10-27 PROCEDURE — 99214 PR OFFICE/OUTPT VISIT, EST, LEVL IV, 30-39 MIN: ICD-10-PCS | Mod: 25,S$GLB,, | Performed by: FAMILY MEDICINE

## 2023-10-27 PROCEDURE — 3288F FALL RISK ASSESSMENT DOCD: CPT | Mod: CPTII,S$GLB,, | Performed by: FAMILY MEDICINE

## 2023-10-27 PROCEDURE — 1160F PR REVIEW ALL MEDS BY PRESCRIBER/CLIN PHARMACIST DOCUMENTED: ICD-10-PCS | Mod: CPTII,S$GLB,, | Performed by: FAMILY MEDICINE

## 2023-10-27 PROCEDURE — 3044F PR MOST RECENT HEMOGLOBIN A1C LEVEL <7.0%: ICD-10-PCS | Mod: CPTII,S$GLB,, | Performed by: FAMILY MEDICINE

## 2023-10-27 PROCEDURE — 1160F RVW MEDS BY RX/DR IN RCRD: CPT | Mod: CPTII,S$GLB,, | Performed by: FAMILY MEDICINE

## 2023-10-27 PROCEDURE — 3075F SYST BP GE 130 - 139MM HG: CPT | Mod: CPTII,S$GLB,, | Performed by: FAMILY MEDICINE

## 2023-10-27 PROCEDURE — 3079F PR MOST RECENT DIASTOLIC BLOOD PRESSURE 80-89 MM HG: ICD-10-PCS | Mod: CPTII,S$GLB,, | Performed by: FAMILY MEDICINE

## 2023-10-27 PROCEDURE — 1101F PT FALLS ASSESS-DOCD LE1/YR: CPT | Mod: CPTII,S$GLB,, | Performed by: FAMILY MEDICINE

## 2023-10-27 PROCEDURE — 1125F AMNT PAIN NOTED PAIN PRSNT: CPT | Mod: CPTII,S$GLB,, | Performed by: FAMILY MEDICINE

## 2023-10-27 PROCEDURE — 73110 X-RAY EXAM OF WRIST: CPT | Mod: TC,PO,LT

## 2023-10-27 PROCEDURE — 96372 PR INJECTION,THERAP/PROPH/DIAG2ST, IM OR SUBCUT: ICD-10-PCS | Mod: S$GLB,,, | Performed by: FAMILY MEDICINE

## 2023-10-27 PROCEDURE — 3044F HG A1C LEVEL LT 7.0%: CPT | Mod: CPTII,S$GLB,, | Performed by: FAMILY MEDICINE

## 2023-10-27 PROCEDURE — 1159F PR MEDICATION LIST DOCUMENTED IN MEDICAL RECORD: ICD-10-PCS | Mod: CPTII,S$GLB,, | Performed by: FAMILY MEDICINE

## 2023-10-27 PROCEDURE — 1101F PR PT FALLS ASSESS DOC 0-1 FALLS W/OUT INJ PAST YR: ICD-10-PCS | Mod: CPTII,S$GLB,, | Performed by: FAMILY MEDICINE

## 2023-10-27 PROCEDURE — 99999 PR PBB SHADOW E&M-EST. PATIENT-LVL IV: CPT | Mod: PBBFAC,,, | Performed by: FAMILY MEDICINE

## 2023-10-27 PROCEDURE — 3008F BODY MASS INDEX DOCD: CPT | Mod: CPTII,S$GLB,, | Performed by: FAMILY MEDICINE

## 2023-10-27 PROCEDURE — 3075F PR MOST RECENT SYSTOLIC BLOOD PRESS GE 130-139MM HG: ICD-10-PCS | Mod: CPTII,S$GLB,, | Performed by: FAMILY MEDICINE

## 2023-10-27 PROCEDURE — 99999 PR PBB SHADOW E&M-EST. PATIENT-LVL IV: ICD-10-PCS | Mod: PBBFAC,,, | Performed by: FAMILY MEDICINE

## 2023-10-27 PROCEDURE — 99214 OFFICE O/P EST MOD 30 MIN: CPT | Mod: 25,S$GLB,, | Performed by: FAMILY MEDICINE

## 2023-10-27 RX ORDER — METHYLPREDNISOLONE ACETATE 40 MG/ML
80 INJECTION, SUSPENSION INTRA-ARTICULAR; INTRALESIONAL; INTRAMUSCULAR; SOFT TISSUE
Status: COMPLETED | OUTPATIENT
Start: 2023-10-27 | End: 2023-10-27

## 2023-10-27 RX ORDER — KETOROLAC TROMETHAMINE 30 MG/ML
30 INJECTION, SOLUTION INTRAMUSCULAR; INTRAVENOUS
Status: COMPLETED | OUTPATIENT
Start: 2023-10-27 | End: 2023-10-27

## 2023-10-27 RX ADMIN — KETOROLAC TROMETHAMINE 30 MG: 30 INJECTION, SOLUTION INTRAMUSCULAR; INTRAVENOUS at 09:10

## 2023-10-27 RX ADMIN — METHYLPREDNISOLONE ACETATE 80 MG: 40 INJECTION, SUSPENSION INTRA-ARTICULAR; INTRALESIONAL; INTRAMUSCULAR; SOFT TISSUE at 09:10

## 2023-10-27 NOTE — PROGRESS NOTES
"Subjective:       Patient ID: Shawn Hand is a 67 y.o. male.    Chief Complaint: Wrist Injury (Pt states he has been having some pain in his wrist )    Pt is a 67 y.o. male who presents for evaluation and management of   Encounter Diagnoses   Name Primary?    Left wrist pain Yes    Left hand weakness     Strain of flexor tendon of wrist    .injured wrist 2 weeks ago moving some angle iron. Felt it twist and "pop"   Unable to grasp very well since. He is a  and can't shoot his gun.     Doing well on current meds. Denies any side effects. Prevention is up to date.    Review of Systems   Constitutional:  Negative for fever.   Respiratory:  Negative for shortness of breath.    Cardiovascular:  Negative for chest pain.   Gastrointestinal:  Negative for anal bleeding and blood in stool.   Genitourinary:  Negative for dysuria.   Musculoskeletal:  Positive for joint swelling.   Neurological:  Negative for dizziness and light-headedness.       Objective:      Physical Exam  Constitutional:       Appearance: Normal appearance. He is well-developed. He is not ill-appearing.   HENT:      Head: Normocephalic and atraumatic.      Right Ear: External ear normal.      Left Ear: External ear normal.      Nose: Nose normal.      Mouth/Throat:      Mouth: Mucous membranes are moist.      Pharynx: No oropharyngeal exudate or posterior oropharyngeal erythema.   Eyes:      General: No scleral icterus.        Right eye: No discharge.         Left eye: No discharge.      Conjunctiva/sclera: Conjunctivae normal.      Pupils: Pupils are equal, round, and reactive to light.   Neck:      Thyroid: No thyromegaly.      Vascular: No JVD.      Trachea: No tracheal deviation.   Cardiovascular:      Rate and Rhythm: Normal rate and regular rhythm.      Heart sounds: Normal heart sounds. No murmur heard.  Pulmonary:      Effort: Pulmonary effort is normal. No respiratory distress.      Breath sounds: Normal breath sounds. No wheezing or " rales.   Chest:      Chest wall: No tenderness.   Abdominal:      General: Bowel sounds are normal. There is no distension.      Palpations: Abdomen is soft. There is no mass.      Tenderness: There is no abdominal tenderness. There is no guarding or rebound.   Musculoskeletal:      Cervical back: Normal range of motion and neck supple.      Right lower leg: No edema.      Left lower leg: No edema.      Comments: Has some TTP on the flexor tendons. Feels like flexor carpi ulnaris is the most tender   Very weak hand grasp    Lymphadenopathy:      Cervical: No cervical adenopathy.   Skin:     General: Skin is warm and dry.   Neurological:      Mental Status: He is alert and oriented to person, place, and time.      Cranial Nerves: No cranial nerve deficit.      Motor: No abnormal muscle tone.      Coordination: Coordination normal.      Deep Tendon Reflexes: Reflexes are normal and symmetric. Reflexes normal.   Psychiatric:         Behavior: Behavior normal.         Thought Content: Thought content normal.         Judgment: Judgment normal.         Assessment:       1. Left wrist pain    2. Left hand weakness    3. Strain of flexor tendon of wrist        Plan:   1. Left wrist pain  -     X-Ray Wrist Complete Left; Future; Expected date: 10/27/2023    2. Left hand weakness    3. Strain of flexor tendon of wrist  -     methylPREDNISolone acetate injection 80 mg  -     ketorolac injection 30 mg    Xray negative for fracture but not yet read by radiology   Wrist splint qhs and during day if able to function with it   Consider PT.   No follow-ups on file.

## 2023-12-04 LAB
CHOLEST SERPL-MSCNC: 134 MG/DL (ref 0–200)
CHOLEST/HDLC SERPL: 3.6 {RATIO}
HDLC SERPL-MCNC: 37 MG/DL (ref 35–70)
LDL CHOLESTEROL DIRECT: 85 MG/DL
NON HDL CHOL. (LDL+VLDL): 97
TRIGL SERPL-MCNC: 114 MG/DL (ref 40–160)
VLDL CHOLESTEROL: 23 MG/DL

## 2023-12-11 DIAGNOSIS — M10.9 GOUT, ARTHRITIS: Primary | ICD-10-CM

## 2023-12-11 DIAGNOSIS — J43.9 PULMONARY EMPHYSEMA, UNSPECIFIED EMPHYSEMA TYPE: ICD-10-CM

## 2023-12-11 RX ORDER — BUDESONIDE AND FORMOTEROL FUMARATE DIHYDRATE 160; 4.5 UG/1; UG/1
2 AEROSOL RESPIRATORY (INHALATION) EVERY 12 HOURS
Qty: 10.2 G | Refills: 5 | Status: SHIPPED | OUTPATIENT
Start: 2023-12-11 | End: 2024-03-25 | Stop reason: SDUPTHER

## 2023-12-11 RX ORDER — ALBUTEROL SULFATE 90 UG/1
2 AEROSOL, METERED RESPIRATORY (INHALATION) EVERY 6 HOURS PRN
Qty: 18 G | Refills: 5 | Status: SHIPPED | OUTPATIENT
Start: 2023-12-11

## 2023-12-11 RX ORDER — ALLOPURINOL 100 MG/1
100 TABLET ORAL DAILY
Qty: 90 TABLET | Refills: 0 | Status: SHIPPED | OUTPATIENT
Start: 2023-12-11 | End: 2024-01-17 | Stop reason: SDUPTHER

## 2023-12-11 NOTE — TELEPHONE ENCOUNTER
LOV: 10/27/23    Med refill request: Symbicort 160-4.5 mcg,  Allopurinol 100 mg, Albuterol for nebulizer    Med pended.

## 2023-12-11 NOTE — TELEPHONE ENCOUNTER
----- Message from Roman Barker sent at 2023  1:10 PM CST -----  Contact: Wife - Sarah Hand  MRN: [id number]  : 1955  PCP: Jaison Tesfaye  Home Phone      [phone]  Work Phone      Not on file.  Mobile          [phone]      MESSAGE:   Pt requesting refill or new Rx.   Is this a refill or new RX:  refills  RX name and strength: Symbicort 160-4.5 mcg                                        Allopurinol 100 mg                                        Albuterol for nebulizer  Last office visit: 10/27/23  Is this a 30-day or 90-day RX:  ???  [facility] [place]  Comments:      Phone:  Sarah @  [phone]    PCP: Mera

## 2023-12-11 NOTE — TELEPHONE ENCOUNTER
Care Due:                  Date            Visit Type   Department     Provider  --------------------------------------------------------------------------------                                EP -                              [facility]  Last Visit: 10-      CARE ([facility])   LEE Tesfaye                              EP -                              PRIMARY      [facility]  Next Visit: 02-      CARE ([facility])   LEE Tesfaye                                                            Last  Test          Frequency    Reason                     Performed    Due Date  --------------------------------------------------------------------------------    CBC.........  12 months..  allopurinoL..............  Not Found    Overdue    Uric Acid...  12 months..  allopurinoL, colchicine..  Not Found    Overdue    [employer] Embedded Care Due Messages. Reference number: [id number].   12/11/2023 3:25:58 PM CST

## 2023-12-15 ENCOUNTER — TELEPHONE (OUTPATIENT)
Dept: INTERNAL MEDICINE | Facility: CLINIC | Age: 68
End: 2023-12-15
Payer: COMMERCIAL

## 2023-12-15 NOTE — TELEPHONE ENCOUNTER
----- Message from Luz Maria Ludwig sent at 12/15/2023  1:34 PM CST -----  Contact: Sarah-wife  Shawn Hand  MRN: [id number]  : 1955  PCP: Jaison Tesfaye  Home Phone      [phone]  Work Phone      Not on file.  Mobile          [phone]      MESSAGE:   Patient needs the tubes for his nebulizer, not a puffer. They need the 2.5 mg for the nebulizer      [phone]

## 2023-12-15 NOTE — TELEPHONE ENCOUNTER
Patient needs the ampule for breathing treatment not the inhaler. Can you please send this in. [facility] [place].

## 2023-12-17 RX ORDER — ALBUTEROL SULFATE 0.83 MG/ML
2.5 SOLUTION RESPIRATORY (INHALATION) EVERY 6 HOURS PRN
Qty: 90 ML | Refills: 11 | Status: SHIPPED | OUTPATIENT
Start: 2023-12-17

## 2023-12-26 DIAGNOSIS — J44.1 CHRONIC OBSTRUCTIVE PULMONARY DISEASE WITH ACUTE EXACERBATION: Primary | ICD-10-CM

## 2023-12-26 DIAGNOSIS — J30.1 SEASONAL ALLERGIC RHINITIS DUE TO POLLEN: ICD-10-CM

## 2023-12-26 RX ORDER — MONTELUKAST SODIUM 10 MG/1
10 TABLET ORAL NIGHTLY
Qty: 90 TABLET | Refills: 3 | Status: SHIPPED | OUTPATIENT
Start: 2023-12-26

## 2024-01-17 DIAGNOSIS — M10.9 GOUT, ARTHRITIS: ICD-10-CM

## 2024-01-17 RX ORDER — ALLOPURINOL 100 MG/1
100 TABLET ORAL DAILY
Qty: 90 TABLET | Refills: 0 | Status: SHIPPED | OUTPATIENT
Start: 2024-01-17 | End: 2024-06-13 | Stop reason: SDUPTHER

## 2024-01-17 NOTE — TELEPHONE ENCOUNTER
No care due was identified.  [facility] Embedded Care Due Messages. Reference number: [id number].   1/17/2024 4:26:07 PM CST

## 2024-01-17 NOTE — TELEPHONE ENCOUNTER
----- Message from Mark Watt sent at 2024  3:50 PM CST -----  Contact: pts wife  Shawn Hand  MRN: [id number]  : 1955  PCP: Jaison Tesfaye  Home Phone      [phone]  Work Phone      Not on file.  Mobile          [phone]      MESSAGE:     Pts wife called in regards to pt requesting refill of medication allopurinoL (ZYLOPRIM) 100 MG tablet. Pt would like medication to be sent to [id number] - [place], [place] - [address]          Please advise  [place]   [phone]

## 2024-01-17 NOTE — TELEPHONE ENCOUNTER
LOV: 10/27/23    Med refill request:  allopurinoL (ZYLOPRIM) 100 MG tablet    Med pended to [facility] in [place]

## 2024-02-05 ENCOUNTER — OFFICE VISIT (OUTPATIENT)
Dept: FAMILY MEDICINE | Facility: CLINIC | Age: 69
End: 2024-02-05
Payer: COMMERCIAL

## 2024-02-05 VITALS
HEIGHT: 70 IN | OXYGEN SATURATION: 97 % | DIASTOLIC BLOOD PRESSURE: 72 MMHG | HEART RATE: 87 BPM | WEIGHT: 315 LBS | BODY MASS INDEX: 45.1 KG/M2 | RESPIRATION RATE: 16 BRPM | SYSTOLIC BLOOD PRESSURE: 138 MMHG

## 2024-02-05 DIAGNOSIS — E78.5 DYSLIPIDEMIA: Primary | ICD-10-CM

## 2024-02-05 DIAGNOSIS — E66.01 MORBID OBESITY: ICD-10-CM

## 2024-02-05 DIAGNOSIS — I48.91 ATRIAL FIBRILLATION, UNSPECIFIED TYPE: ICD-10-CM

## 2024-02-05 DIAGNOSIS — J43.9 PULMONARY EMPHYSEMA, UNSPECIFIED EMPHYSEMA TYPE: ICD-10-CM

## 2024-02-05 DIAGNOSIS — K21.9 GASTROESOPHAGEAL REFLUX DISEASE, UNSPECIFIED WHETHER ESOPHAGITIS PRESENT: ICD-10-CM

## 2024-02-05 DIAGNOSIS — J43.8 OTHER EMPHYSEMA: ICD-10-CM

## 2024-02-05 PROCEDURE — 1101F PT FALLS ASSESS-DOCD LE1/YR: CPT | Mod: CPTII,S$GLB,, | Performed by: FAMILY MEDICINE

## 2024-02-05 PROCEDURE — 1126F AMNT PAIN NOTED NONE PRSNT: CPT | Mod: CPTII,S$GLB,, | Performed by: FAMILY MEDICINE

## 2024-02-05 PROCEDURE — 99999 PR PBB SHADOW E&M-EST. PATIENT-LVL IV: CPT | Mod: PBBFAC,,, | Performed by: FAMILY MEDICINE

## 2024-02-05 PROCEDURE — 3008F BODY MASS INDEX DOCD: CPT | Mod: CPTII,S$GLB,, | Performed by: FAMILY MEDICINE

## 2024-02-05 PROCEDURE — 1159F MED LIST DOCD IN RCRD: CPT | Mod: CPTII,S$GLB,, | Performed by: FAMILY MEDICINE

## 2024-02-05 PROCEDURE — 3078F DIAST BP <80 MM HG: CPT | Mod: CPTII,S$GLB,, | Performed by: FAMILY MEDICINE

## 2024-02-05 PROCEDURE — 3288F FALL RISK ASSESSMENT DOCD: CPT | Mod: CPTII,S$GLB,, | Performed by: FAMILY MEDICINE

## 2024-02-05 PROCEDURE — 99214 OFFICE O/P EST MOD 30 MIN: CPT | Mod: S$GLB,,, | Performed by: FAMILY MEDICINE

## 2024-02-05 PROCEDURE — 1160F RVW MEDS BY RX/DR IN RCRD: CPT | Mod: CPTII,S$GLB,, | Performed by: FAMILY MEDICINE

## 2024-02-05 PROCEDURE — 3075F SYST BP GE 130 - 139MM HG: CPT | Mod: CPTII,S$GLB,, | Performed by: FAMILY MEDICINE

## 2024-02-05 RX ORDER — APIXABAN 5 MG/1
5 TABLET, FILM COATED ORAL 2 TIMES DAILY
COMMUNITY
Start: 2023-12-18

## 2024-02-05 NOTE — LETTER
AUTHORIZATION FOR RELEASE OF   CONFIDENTIAL INFORMATION    Dear to whom it may concern,    We are seeing Shawn Hand, date of birth 1955, in the clinic at [facility]. Jaison Tesfaye MD is the patient's PCP. Shawn Hand has an outstanding lab/procedure at the time we reviewed his chart. In order to help keep his health information updated, he has authorized us to request the following medical record(s):        (  )  Please fax most recent labs results.          Please fax records to Jaison Tesfaye MD, [phone].      If you have any questions, please contact  [phone].           Patient Name: Shawn Hand  : 1955  Patient Phone #: [phone]

## 2024-02-05 NOTE — PROGRESS NOTES
Subjective:       Patient ID: Shawn Hand is a 68 y.o. male.    Chief Complaint: Follow-up (Pt here for 6 mth f/u. )    Pt is a 68 y.o. male who presents for evaluation and management of   Encounter Diagnoses   Name Primary?    Morbid obesity     Atrial fibrillation, unspecified type     Dyslipidemia Yes    Pulmonary emphysema, unspecified emphysema type     Other emphysema     Gastroesophageal reflux disease, unspecified whether esophagitis present    .  Doing well on current meds. Denies any side effects. Prevention is up to date.    Review of Systems   Constitutional:  Negative for fever.   Respiratory:  Negative for shortness of breath.    Cardiovascular:  Negative for chest pain.   Gastrointestinal:  Negative for anal bleeding and blood in stool.   Genitourinary:  Negative for dysuria.   Neurological:  Negative for dizziness and light-headedness.       Objective:      Physical Exam  Constitutional:       Appearance: Normal appearance. He is well-developed. He is obese. He is not ill-appearing.   HENT:      Head: Normocephalic and atraumatic.      Right Ear: External ear normal.      Left Ear: External ear normal.      Nose: Nose normal.      Mouth/Throat:      Mouth: Mucous membranes are moist.      Pharynx: No oropharyngeal exudate or posterior oropharyngeal erythema.   Eyes:      General: No scleral icterus.        Right eye: No discharge.         Left eye: No discharge.      Conjunctiva/sclera: Conjunctivae normal.      Pupils: Pupils are equal, round, and reactive to light.   Neck:      Thyroid: No thyromegaly.      Vascular: No JVD.      Trachea: No tracheal deviation.   Cardiovascular:      Rate and Rhythm: Normal rate and regular rhythm.      Heart sounds: Normal heart sounds. No murmur heard.  Pulmonary:      Effort: Pulmonary effort is normal. No respiratory distress.      Breath sounds: Normal breath sounds. No wheezing or rales.   Chest:      Chest wall: No tenderness.   Abdominal:       General: Bowel sounds are normal. There is no distension.      Palpations: Abdomen is soft. There is no mass.      Tenderness: There is no abdominal tenderness. There is no guarding or rebound.   Musculoskeletal:         General: Normal range of motion.      Cervical back: Normal range of motion and neck supple.      Right lower leg: No edema.      Left lower leg: No edema.   Lymphadenopathy:      Cervical: No cervical adenopathy.   Skin:     General: Skin is warm and dry.   Neurological:      Mental Status: He is alert and oriented to person, place, and time.      Cranial Nerves: No cranial nerve deficit.      Motor: No abnormal muscle tone.      Coordination: Coordination normal.      Deep Tendon Reflexes: Reflexes are normal and symmetric. Reflexes normal.   Psychiatric:         Behavior: Behavior normal.         Thought Content: Thought content normal.         Judgment: Judgment normal.         Assessment:       1. Dyslipidemia    2. Morbid obesity    3. Atrial fibrillation, unspecified type    4. Pulmonary emphysema, unspecified emphysema type    5. Other emphysema    6. Gastroesophageal reflux disease, unspecified whether esophagitis present        Plan:   1. Dyslipidemia  Overview:  Pravastatin   Lab Results   Component Value Date    LDLCALC 76.2 08/11/2023         Orders:  -     Comprehensive Metabolic Panel; Future; Expected date: 02/05/2024    2. Morbid obesity  Overview:  The patient is asked to make an attempt to improve diet and exercise patterns to aid in medical management of this problem.  Cut out white carbs: bread, rice, pasta, potatoes. Exercise/walk 5x/week for at least 30 minutes  .        3. Atrial fibrillation, unspecified type  Overview:  Eliquis   Diltiazem   Sees [facility]         4. Pulmonary emphysema, unspecified emphysema type    5. Other emphysema  Overview:  Symbicort         6. Gastroesophageal reflux disease, unspecified whether esophagitis present      No follow-ups on file.

## 2024-03-25 ENCOUNTER — TELEPHONE (OUTPATIENT)
Dept: FAMILY MEDICINE | Facility: CLINIC | Age: 69
End: 2024-03-25
Payer: COMMERCIAL

## 2024-03-25 DIAGNOSIS — J43.9 PULMONARY EMPHYSEMA, UNSPECIFIED EMPHYSEMA TYPE: ICD-10-CM

## 2024-03-25 RX ORDER — BUDESONIDE AND FORMOTEROL FUMARATE DIHYDRATE 160; 4.5 UG/1; UG/1
2 AEROSOL RESPIRATORY (INHALATION) EVERY 12 HOURS
Qty: 10.2 G | Refills: 5 | Status: SHIPPED | OUTPATIENT
Start: 2024-03-25 | End: 2024-04-02 | Stop reason: SDUPTHER

## 2024-03-25 NOTE — TELEPHONE ENCOUNTER
----- Message from Luz Maria Ludwig sent at 3/25/2024  2:58 PM CDT -----  Contact: Sarah-wife  Shawn Hand  MRN: [id number]  : 1955  PCP: Jaison Tesfaye  Home Phone      [phone]  Work Phone      Not on file.  Mobile          [phone]      MESSAGE:   Patient is asking for symvicourt instead he does not like the abreva. Please advise        [phone]

## 2024-04-02 DIAGNOSIS — J43.9 PULMONARY EMPHYSEMA, UNSPECIFIED EMPHYSEMA TYPE: ICD-10-CM

## 2024-04-02 RX ORDER — BUDESONIDE AND FORMOTEROL FUMARATE DIHYDRATE 160; 4.5 UG/1; UG/1
2 AEROSOL RESPIRATORY (INHALATION) EVERY 12 HOURS
Qty: 10.2 G | Refills: 5 | Status: SHIPPED | OUTPATIENT
Start: 2024-04-02 | End: 2025-04-02

## 2024-04-03 ENCOUNTER — TELEPHONE (OUTPATIENT)
Dept: FAMILY MEDICINE | Facility: CLINIC | Age: 69
End: 2024-04-03
Payer: COMMERCIAL

## 2024-04-03 DIAGNOSIS — J43.9 PULMONARY EMPHYSEMA, UNSPECIFIED EMPHYSEMA TYPE: ICD-10-CM

## 2024-04-03 RX ORDER — BUDESONIDE AND FORMOTEROL FUMARATE DIHYDRATE 160; 4.5 UG/1; UG/1
2 AEROSOL RESPIRATORY (INHALATION) EVERY 12 HOURS
Qty: 6 G | Refills: 11 | Status: SHIPPED | OUTPATIENT
Start: 2024-04-03 | End: 2024-04-08

## 2024-04-03 NOTE — TELEPHONE ENCOUNTER
[facility] - requesting New Rx for Symbicort      patient requesting name brand  -- Rx needs ANAHI 1 or name brand necessity        Please advise

## 2024-04-03 NOTE — TELEPHONE ENCOUNTER
----- Message from Roman Barker sent at 4/3/2024 12:25 PM CDT -----  Contact: Quinton @ [facility] [place]  Shawn Hand  MRN: [id number]  : 1955  PCP: Jaison Tesfaye  Home Phone      [phone]  Work Phone      Not on file.  Mobile          [phone]      MESSAGE:  [facility] [place] - requesting New Rx for Symbicort - patient requesting name brand  -- Rx needs ANAHI 1 or name brand necessity     Call Quinton @ [phone]    PCP: Mera

## 2024-04-03 NOTE — TELEPHONE ENCOUNTER
[facility] pharmacist now saying rx will need a pa to be filled per insurance.     Pa initiated rx symbicort

## 2024-04-08 RX ORDER — BUDESONIDE AND FORMOTEROL FUMARATE DIHYDRATE 160; 4.5 UG/1; UG/1
2 AEROSOL RESPIRATORY (INHALATION) 2 TIMES DAILY
Qty: 10.2 G | Refills: 5 | Status: SHIPPED | OUTPATIENT
Start: 2024-04-08 | End: 2024-05-08

## 2024-04-09 NOTE — TELEPHONE ENCOUNTER
Received call from Kendal w/ [facility]. Needing to clarify generic and brand name scripts for Symbicort received. Notified Kendal, generic name for Symbicort was sent in yesterday due to pts' insurance not covering brand name. Also sates a 90 day supply w/ 3 refills will benefit pt. Gave verbal okay. Kendal gave understanding.

## 2024-06-04 ENCOUNTER — TELEPHONE (OUTPATIENT)
Dept: FAMILY MEDICINE | Facility: CLINIC | Age: 69
End: 2024-06-04
Payer: COMMERCIAL

## 2024-06-04 NOTE — TELEPHONE ENCOUNTER
----- Message from Roman Barker sent at 2024  8:17 AM CDT -----  Contact: Patient  Shawn Hand  MRN: [id number]  : 1955  PCP: Jaison Tesfaye  Home Phone      [phone]  Work Phone      Not on file.  Mobile          [phone]      MESSAGE: left foot swollen - congestion --requesting appt today or tomorrow with Dr Tesfaye    Call [phone]    PCP: Mera

## 2024-06-05 ENCOUNTER — OFFICE VISIT (OUTPATIENT)
Dept: FAMILY MEDICINE | Facility: CLINIC | Age: 69
End: 2024-06-05
Payer: COMMERCIAL

## 2024-06-05 ENCOUNTER — TELEPHONE (OUTPATIENT)
Dept: BARIATRICS | Facility: CLINIC | Age: 69
End: 2024-06-05
Payer: COMMERCIAL

## 2024-06-05 VITALS
DIASTOLIC BLOOD PRESSURE: 68 MMHG | RESPIRATION RATE: 20 BRPM | HEIGHT: 70 IN | OXYGEN SATURATION: 96 % | BODY MASS INDEX: 44.76 KG/M2 | WEIGHT: 312.63 LBS | SYSTOLIC BLOOD PRESSURE: 132 MMHG | HEART RATE: 88 BPM

## 2024-06-05 DIAGNOSIS — E66.01 MORBID OBESITY: Primary | ICD-10-CM

## 2024-06-05 PROCEDURE — 3075F SYST BP GE 130 - 139MM HG: CPT | Mod: CPTII,S$GLB,, | Performed by: FAMILY MEDICINE

## 2024-06-05 PROCEDURE — 3078F DIAST BP <80 MM HG: CPT | Mod: CPTII,S$GLB,, | Performed by: FAMILY MEDICINE

## 2024-06-05 PROCEDURE — 1126F AMNT PAIN NOTED NONE PRSNT: CPT | Mod: CPTII,S$GLB,, | Performed by: FAMILY MEDICINE

## 2024-06-05 PROCEDURE — 3008F BODY MASS INDEX DOCD: CPT | Mod: CPTII,S$GLB,, | Performed by: FAMILY MEDICINE

## 2024-06-05 PROCEDURE — 99213 OFFICE O/P EST LOW 20 MIN: CPT | Mod: S$GLB,,, | Performed by: FAMILY MEDICINE

## 2024-06-05 PROCEDURE — 99999 PR PBB SHADOW E&M-EST. PATIENT-LVL IV: CPT | Mod: PBBFAC,,, | Performed by: FAMILY MEDICINE

## 2024-06-05 PROCEDURE — 3288F FALL RISK ASSESSMENT DOCD: CPT | Mod: CPTII,S$GLB,, | Performed by: FAMILY MEDICINE

## 2024-06-05 PROCEDURE — 1101F PT FALLS ASSESS-DOCD LE1/YR: CPT | Mod: CPTII,S$GLB,, | Performed by: FAMILY MEDICINE

## 2024-06-05 PROCEDURE — 1159F MED LIST DOCD IN RCRD: CPT | Mod: CPTII,S$GLB,, | Performed by: FAMILY MEDICINE

## 2024-06-05 NOTE — LETTER
June 5, 2024      [address] [address]  Phone: [phone]  Fax: [phone]       Patient: Shawn Hand   YOB: 1955  Date of Visit: 06/05/2024    To Whom It May Concern:    Please excuse Fela Hand from work on 06/03/24 through 06/05/24. If you have any questions or concerns, or if I can be of further assistance, please do not hesitate to contact me.    Sincerely,    DEVORAH Robb MD

## 2024-06-05 NOTE — PROGRESS NOTES
Subjective:       Patient ID: Shawn Hand is a 68 y.o. male.    Chief Complaint: Foot Swelling, Fatigue, Nasal Congestion, and Sinus Problem    Pt is a 68 y.o. male who presents for evaluation and management of   Encounter Diagnosis   Name Primary?    Morbid obesity Yes   .he has failed many diets and exercise. He sometimes briefly loses weight, but always gains it back   His HTN and lumbar stenosis diagnoses are directly linked to his morbid obesity and are likely reversible with weight loss     Doing well on current meds. Denies any side effects. Prevention is up to date.  Review of Systems   Respiratory:  Negative for shortness of breath.    Cardiovascular:  Negative for chest pain.       Objective:      Physical Exam  Constitutional:       Appearance: Normal appearance. He is well-developed. He is obese. He is not ill-appearing.   HENT:      Head: Normocephalic and atraumatic.      Right Ear: External ear normal.      Left Ear: External ear normal.      Nose: Nose normal.      Mouth/Throat:      Mouth: Mucous membranes are moist.      Pharynx: No oropharyngeal exudate or posterior oropharyngeal erythema.   Eyes:      General: No scleral icterus.        Right eye: No discharge.         Left eye: No discharge.      Conjunctiva/sclera: Conjunctivae normal.      Pupils: Pupils are equal, round, and reactive to light.   Neck:      Thyroid: No thyromegaly.      Vascular: No JVD.      Trachea: No tracheal deviation.   Cardiovascular:      Rate and Rhythm: Normal rate and regular rhythm.      Heart sounds: Normal heart sounds. No murmur heard.  Pulmonary:      Effort: Pulmonary effort is normal. No respiratory distress.      Breath sounds: Normal breath sounds. No wheezing or rales.   Chest:      Chest wall: No tenderness.   Abdominal:      General: Bowel sounds are normal. There is no distension.      Palpations: Abdomen is soft. There is no mass.      Tenderness: There is no abdominal tenderness. There is no  guarding or rebound.   Musculoskeletal:         General: Normal range of motion.      Cervical back: Normal range of motion and neck supple.      Right lower leg: No edema.      Left lower leg: No edema.   Lymphadenopathy:      Cervical: No cervical adenopathy.   Skin:     General: Skin is warm and dry.   Neurological:      Mental Status: He is alert and oriented to person, place, and time.      Cranial Nerves: No cranial nerve deficit.      Motor: No abnormal muscle tone.      Coordination: Coordination normal.      Deep Tendon Reflexes: Reflexes are normal and symmetric. Reflexes normal.   Psychiatric:         Behavior: Behavior normal.         Thought Content: Thought content normal.         Judgment: Judgment normal.         Assessment:       1. Morbid obesity        Plan:   1. Morbid obesity  Overview:  The patient is asked to make an attempt to improve diet and exercise patterns to aid in medical management of this problem.  Cut out white carbs: bread, rice, pasta, potatoes. Exercise/walk 5x/week for at least 30 minutes  .      Orders:  -     Ambulatory referral/consult to Bariatric Surgery; Future; Expected date: 06/12/2024      No follow-ups on file.

## 2024-06-13 DIAGNOSIS — M10.9 GOUT, ARTHRITIS: ICD-10-CM

## 2024-06-13 RX ORDER — ALLOPURINOL 100 MG/1
100 TABLET ORAL DAILY
Qty: 90 TABLET | Refills: 0 | Status: SHIPPED | OUTPATIENT
Start: 2024-06-13

## 2024-06-13 NOTE — TELEPHONE ENCOUNTER
----- Message from Roman Barker sent at 2024 11:44 AM CDT -----  Contact: Wife - Sarah Hand  MRN: [id number]  : 1955  PCP: Jaison Tesfaye  Home Phone      [phone]  Work Phone      Not on file.  Mobile          [phone]      MESSAGE:   Pt requesting refill or new Rx.   Is this a refill or new RX:  refill  RX name and strength: Allopurinol 100 mg  Last office visit: 24  Is this a 30-day or 90-day RX:  90 day  Ph[facility] and location[facility]  Comments:      Phone:  Sarah @ [phone]    PCP: Mera

## 2024-06-13 NOTE — TELEPHONE ENCOUNTER
Care Due:                  Date            Visit Type   Department     Provider  --------------------------------------------------------------------------------                                EP -                              [facility]  Last Visit: 06-      CARE ([facility])   LEE Tesfaye                              EP -                              PRIMARY      [facility]  Next Visit: 08-      CARE ([facility])   LEE Tesfaye                                                            Last  Test          Frequency    Reason                     Performed    Due Date  --------------------------------------------------------------------------------    CBC.........  12 months..  allopurinoL..............  Not Found    Overdue    CMP.........  12 months..  allopurinoL..............  08- 08-    Uric Acid...  12 months..  allopurinoL..............  Not Found    Overdue    [employer] Embedded Care Due Messages. Reference number: [id number].   6/13/2024 1:12:50 PM CDT

## 2024-06-13 NOTE — TELEPHONE ENCOUNTER
Pt requesting medication refill. LOV: 06/05/24  Pharm: [facility]  Requested Prescriptions     Pending Prescriptions Disp Refills    allopurinoL (ZYLOPRIM) 100 MG tablet 90 tablet 0     Sig: Take 1 tablet (100 mg total) by mouth once daily.

## 2024-06-21 ENCOUNTER — TELEPHONE (OUTPATIENT)
Dept: BARIATRICS | Facility: CLINIC | Age: 69
End: 2024-06-21
Payer: COMMERCIAL

## 2024-06-22 ENCOUNTER — HOSPITAL ENCOUNTER (EMERGENCY)
Facility: HOSPITAL | Age: 69
Discharge: HOME | End: 2024-06-22
Attending: EMERGENCY MEDICINE
Payer: COMMERCIAL

## 2024-06-22 VITALS
DIASTOLIC BLOOD PRESSURE: 72 MMHG | TEMPERATURE: 98 F | OXYGEN SATURATION: 96 % | SYSTOLIC BLOOD PRESSURE: 130 MMHG | RESPIRATION RATE: 16 BRPM | BODY MASS INDEX: 44.82 KG/M2 | HEART RATE: 64 BPM | WEIGHT: 312.38 LBS

## 2024-06-22 DIAGNOSIS — R93.89 ABNORMAL FINDING ON IMAGING: Primary | ICD-10-CM

## 2024-06-22 DIAGNOSIS — R10.32 LEFT GROIN PAIN: ICD-10-CM

## 2024-06-22 LAB
ALBUMIN SERPL BCP-MCNC: 3.8 G/DL (ref 3.5–5.2)
ALP SERPL-CCNC: 47 U/L (ref 55–135)
ALT SERPL W/O P-5'-P-CCNC: 29 U/L (ref 10–44)
ANION GAP SERPL CALC-SCNC: 11 MMOL/L (ref 8–16)
AST SERPL-CCNC: 25 U/L (ref 10–40)
BASOPHILS # BLD AUTO: 0.12 K/UL (ref 0–0.2)
BASOPHILS NFR BLD: 1.2 % (ref 0–1.9)
BILIRUB SERPL-MCNC: 0.5 MG/DL (ref 0.1–1)
BILIRUB UR QL STRIP: NEGATIVE
BUN SERPL-MCNC: 19 MG/DL (ref 8–23)
CALCIUM SERPL-MCNC: 10 MG/DL (ref 8.7–10.5)
CHLORIDE SERPL-SCNC: 101 MMOL/L (ref 95–110)
CLARITY UR: CLEAR
CO2 SERPL-SCNC: 26 MMOL/L (ref 23–29)
COLOR UR: YELLOW
CREAT SERPL-MCNC: 1 MG/DL (ref 0.5–1.4)
DIFFERENTIAL METHOD BLD: ABNORMAL
EOSINOPHIL # BLD AUTO: 0.6 K/UL (ref 0–0.5)
EOSINOPHIL NFR BLD: 5.9 % (ref 0–8)
ERYTHROCYTE [DISTWIDTH] IN BLOOD BY AUTOMATED COUNT: 13.9 % (ref 11.5–14.5)
EST. GFR  (NO RACE VARIABLE): >60 ML/MIN/1.73 M^2
GLUCOSE SERPL-MCNC: 102 MG/DL (ref 70–110)
GLUCOSE UR QL STRIP: NEGATIVE
HCT VFR BLD AUTO: 49.5 % (ref 40–54)
HGB BLD-MCNC: 16.6 G/DL (ref 14–18)
HGB UR QL STRIP: NEGATIVE
IMM GRANULOCYTES # BLD AUTO: 0.02 K/UL (ref 0–0.04)
IMM GRANULOCYTES NFR BLD AUTO: 0.2 % (ref 0–0.5)
KETONES UR QL STRIP: NEGATIVE
LEUKOCYTE ESTERASE UR QL STRIP: NEGATIVE
LYMPHOCYTES # BLD AUTO: 1.7 K/UL (ref 1–4.8)
LYMPHOCYTES NFR BLD: 16.8 % (ref 18–48)
MCH RBC QN AUTO: 29.2 PG (ref 27–31)
MCHC RBC AUTO-ENTMCNC: 33.5 G/DL (ref 32–36)
MCV RBC AUTO: 87 FL (ref 82–98)
MONOCYTES # BLD AUTO: 1.1 K/UL (ref 0.3–1)
MONOCYTES NFR BLD: 10.9 % (ref 4–15)
NEUTROPHILS # BLD AUTO: 6.5 K/UL (ref 1.8–7.7)
NEUTROPHILS NFR BLD: 65 % (ref 38–73)
NITRITE UR QL STRIP: NEGATIVE
NRBC BLD-RTO: 0 /100 WBC
PH UR STRIP: 7 [PH] (ref 5–8)
PLATELET # BLD AUTO: 279 K/UL (ref 150–450)
PMV BLD AUTO: 9.7 FL (ref 9.2–12.9)
POTASSIUM SERPL-SCNC: 4.3 MMOL/L (ref 3.5–5.1)
PROT SERPL-MCNC: 7.2 G/DL (ref 6–8.4)
PROT UR QL STRIP: NEGATIVE
RBC # BLD AUTO: 5.68 M/UL (ref 4.6–6.2)
SODIUM SERPL-SCNC: 138 MMOL/L (ref 136–145)
SP GR UR STRIP: 1.01 (ref 1–1.03)
URN SPEC COLLECT METH UR: NORMAL
UROBILINOGEN UR STRIP-ACNC: NEGATIVE EU/DL
WBC # BLD AUTO: 10.01 K/UL (ref 3.9–12.7)

## 2024-06-22 PROCEDURE — 94760 N-INVAS EAR/PLS OXIMETRY 1: CPT

## 2024-06-22 PROCEDURE — 99285 EMERGENCY DEPT VISIT HI MDM: CPT | Mod: 25

## 2024-06-22 PROCEDURE — 80053 COMPREHEN METABOLIC PANEL: CPT | Performed by: EMERGENCY MEDICINE

## 2024-06-22 PROCEDURE — 25500020 PHARM REV CODE 255: Performed by: EMERGENCY MEDICINE

## 2024-06-22 PROCEDURE — 25000003 PHARM REV CODE 250: Performed by: EMERGENCY MEDICINE

## 2024-06-22 PROCEDURE — 81003 URINALYSIS AUTO W/O SCOPE: CPT | Performed by: EMERGENCY MEDICINE

## 2024-06-22 PROCEDURE — 96374 THER/PROPH/DIAG INJ IV PUSH: CPT

## 2024-06-22 PROCEDURE — 85025 COMPLETE CBC W/AUTO DIFF WBC: CPT | Performed by: EMERGENCY MEDICINE

## 2024-06-22 PROCEDURE — 96375 TX/PRO/DX INJ NEW DRUG ADDON: CPT

## 2024-06-22 PROCEDURE — 63600175 PHARM REV CODE 636 W HCPCS: Performed by: EMERGENCY MEDICINE

## 2024-06-22 RX ORDER — HYDROMORPHONE HYDROCHLORIDE 1 MG/ML
0.5 INJECTION, SOLUTION INTRAMUSCULAR; INTRAVENOUS; SUBCUTANEOUS
Status: COMPLETED | OUTPATIENT
Start: 2024-06-22 | End: 2024-06-22

## 2024-06-22 RX ORDER — HYDROCODONE BITARTRATE AND ACETAMINOPHEN 5; 325 MG/1; MG/1
1 TABLET ORAL EVERY 6 HOURS PRN
Qty: 12 TABLET | Refills: 0 | Status: SHIPPED | OUTPATIENT
Start: 2024-06-22 | End: 2024-06-25

## 2024-06-22 RX ORDER — DIAZEPAM 2 MG/1
2 TABLET ORAL
Status: COMPLETED | OUTPATIENT
Start: 2024-06-22 | End: 2024-06-22

## 2024-06-22 RX ORDER — ONDANSETRON HYDROCHLORIDE 2 MG/ML
8 INJECTION, SOLUTION INTRAVENOUS
Status: COMPLETED | OUTPATIENT
Start: 2024-06-22 | End: 2024-06-22

## 2024-06-22 RX ADMIN — IOHEXOL 30 ML: 350 INJECTION, SOLUTION INTRAVENOUS at 09:06

## 2024-06-22 RX ADMIN — DIAZEPAM 2 MG: 2 TABLET ORAL at 08:06

## 2024-06-22 RX ADMIN — IOHEXOL 100 ML: 350 INJECTION, SOLUTION INTRAVENOUS at 09:06

## 2024-06-22 RX ADMIN — ONDANSETRON 8 MG: 2 INJECTION INTRAMUSCULAR; INTRAVENOUS at 08:06

## 2024-06-22 RX ADMIN — HYDROMORPHONE HYDROCHLORIDE 0.5 MG: 1 INJECTION, SOLUTION INTRAMUSCULAR; INTRAVENOUS; SUBCUTANEOUS at 08:06

## 2024-06-22 NOTE — DISCHARGE INSTRUCTIONS
You have a cyst on the left kidney, follow up with your primary care doctor to observe it  Return if symptoms worsen

## 2024-06-22 NOTE — ED PROVIDER NOTES
Encounter Date: 2024       History     Chief Complaint   Patient presents with    Groin Pain     Pt c/o left groin pain radiating into thigh that has worsened over the past couple days. Pt reports history of sciatica problem.     HPI    Patient is a 68y WM hx COPD, HTN, HLD, CAD presenting with left groin pain worsening for the past 2 days.  Complains of ongoing left groin pain that radiates to the left testicle for the past year.  Thinks the inciting event was wearing his [employer] gun belt.  Pain initially was in the buttock and radiated down the posterior leg.  Yesterday it started wrapping around to the left groin.  Today when he woke up he was in so much pain he had difficulty getting out of bed.    Review of patient's allergies indicates:   Allergen Reactions    Niaspan extended-release [niacin] Anaphylaxis     Other reaction(s): Flushing (skin)    Amoxicillin-pot clavulanate Hives and Other (See Comments)    Nystatin Other (See Comments)     Past Medical History:   Diagnosis Date    Atrial fibrillation     COPD (chronic obstructive pulmonary disease)     Coronary artery disease     GERD (gastroesophageal reflux disease)     Hyperlipidemia     Hypertension     Sleep apnea      Past Surgical History:   Procedure Laterality Date    CARDIOVERSION      CHOLECYSTECTOMY      COLONOSCOPY      CORONARY ANGIOPLASTY WITH STENT PLACEMENT      ROTATOR CUFF REPAIR Left     x2    ROTATOR CUFF REPAIR Right 2017    SINUS SURGERY      TONSILLECTOMY       Family History   Problem Relation Name Age of Onset    Asthma Mother      COPD Mother      Heart disease Father          chf     Social History     Tobacco Use    Smoking status: Former     Current packs/day: 0.00     Types: Cigarettes     Quit date:      Years since quittin.4    Smokeless tobacco: Never   Substance Use Topics    Alcohol use: Not Currently    Drug use: Never     Review of Systems   Constitutional:  Negative for chills and fever.   Respiratory:   Negative for cough.    Cardiovascular:  Negative for chest pain.   Genitourinary:  Positive for testicular pain.   Musculoskeletal:  Negative for back pain.   Skin:  Negative for wound.   All other systems reviewed and are negative.      Physical Exam     Initial Vitals   BP Pulse Resp Temp SpO2   06/22/24 0808 06/22/24 0808 06/22/24 0808 06/22/24 0808 06/22/24 0807   131/76 77 16 97.9 °F (36.6 °C) 97 %      MAP       --                Physical Exam    Nursing note and vitals reviewed.  Constitutional: He appears well-developed and well-nourished.   HENT:   Head: Normocephalic and atraumatic.   Mouth/Throat: Oropharynx is clear and moist.   Eyes: EOM are normal. Pupils are equal, round, and reactive to light.   Neck:   Normal range of motion.  Cardiovascular:  Normal rate and intact distal pulses.           Pulmonary/Chest: Breath sounds normal.   Abdominal: Abdomen is soft. There is no abdominal tenderness. There is no rebound.   Musculoskeletal:         General: Normal range of motion.      Cervical back: Normal range of motion.     Neurological: He is alert and oriented to person, place, and time. GCS score is 15. GCS eye subscore is 4. GCS verbal subscore is 5. GCS motor subscore is 6.   Skin: Skin is warm. Capillary refill takes less than 2 seconds.         ED Course   Procedures  Labs Reviewed   CBC W/ AUTO DIFFERENTIAL - Abnormal; Notable for the following components:       Result Value    Mono # 1.1 (*)     Eos # 0.6 (*)     Lymph % 16.8 (*)     All other components within normal limits   COMPREHENSIVE METABOLIC PANEL - Abnormal; Notable for the following components:    Alkaline Phosphatase 47 (*)     All other components within normal limits   URINALYSIS, REFLEX TO URINE CULTURE    Narrative:     Specimen Source->Urine          Imaging Results              US Scrotum And Testicles (Final result)  Result time 06/22/24 10:43:31      Final result by Sun Berger MD (06/22/24 10:43:31)                    Impression:      No evidence of epididymitis, torsion or mass.  Slightly heterogeneous echotexture of both testicles which has developed since the previous ultrasound, perhaps the sequela of prior episodes of infection.      Electronically signed by: Sun Berger  Date:    06/22/2024  Time:    10:43               Narrative:    EXAMINATION:  US SCROTUM AND TESTICLES    CLINICAL HISTORY:  Left lower quadrant pain    TECHNIQUE:  Sonography of the scrotum and testes.    COMPARISON:  03/07/2007, CT of the abdomen and pelvis today    FINDINGS:  Right Testicle:    *Size: 3.5 x 1.8 x 3.4 cm  *Appearance: Mildly heterogeneous echogenicity without focal mass.  This may be the sequela of previous episodes of infection..  *Flow: Normal arterial and venous flow  *Epididymis: Normal.  *Hydrocele: There is no significant hydrocele  *Varicocele: None.  .    Left Testicle:    *Size: 3.9 x 2.2 x 3.1 cm  *Appearance: Mildly heterogeneous echogenicity similar to the contralateral side.  No focal mass.  *Flow: Normal arterial and venous flow  *Epididymis: 5 mm epididymal head cyst  *Hydrocele: None.  *Varicocele: None.  .    Other findings: None.                                       CT Abdomen Pelvis With IV Contrast Routine Oral Contrast (Final result)  Result time 06/22/24 10:06:29      Final result by Sun Berger MD (06/22/24 10:06:29)                   Impression:      No acute abnormality.    Small hiatal hernia.  Atherosclerosis.  Prominent prostate.  Colonic diverticula.    Findings in the left kidney for which follow-up MRI is recommended on nonemergent basis.      Electronically signed by: Sun Berger  Date:    06/22/2024  Time:    10:06               Narrative:    EXAMINATION:  CT ABDOMEN PELVIS WITH IV CONTRAST    CLINICAL HISTORY:  left groin pain;    TECHNIQUE:  Low dose axial images, sagittal and coronal reformations were obtained from the lung bases to the pubic symphysis following the IV  administration of 100 mL of Omnipaque 350 and the oral administration of 30 mL of Omnipaque 350.    COMPARISON:  None.    FINDINGS:  There is a small hiatal hernia.  Lung bases are clear.  Coronary artery calcifications are noted.    There is fatty infiltration of the liver.  The gallbladder is surgically absent.    The spleen, adrenal glands, pancreas are normal.  There is no hydronephrosis, hydroureter or ureteral calculus.  There are bilateral renal cysts which are simple in appearance with the exception of the 14 mm structure at the lower pole of the left kidney which has a density of a 30 Hounsfield units, and the 2.1 cm exophytic structure at the posterior left midpole, with a density of 32 Hounsfield units.  These are likely complicated cysts, but should be followed up with MRI on a nonemergent basis, to rule out cystic neoplasm.    The aorta is normal in caliber with scattered calcific plaque.    Urinary bladder unremarkable.  Prostate mildly prominent.  There is no inguinal adenopathy, mass or fluid collection.  There are tiny fat-containing inguinal hernias bilaterally.    There are innumerable colonic diverticula, particularly in the sigmoid colon.  However, there is no evidence of acute diverticulitis.  No bowel obstruction is present.    There is no free fluid or free air.                                       Medications   diazePAM tablet 2 mg (2 mg Oral Given 6/22/24 0830)   HYDROmorphone injection 0.5 mg (0.5 mg Intravenous Given 6/22/24 0830)   ondansetron injection 8 mg (8 mg Intravenous Given 6/22/24 0829)   iohexoL (OMNIPAQUE 350) injection 30 mL (30 mLs Oral Given 6/22/24 0945)   iohexoL (OMNIPAQUE 350) injection 100 mL (100 mLs Intravenous Given 6/22/24 0945)     Medical Decision Making    This is an emergent evaluation of a 68y WM hx  COPD, HTN, HLD, CAD presenting with left groin pain worsening for the past 2 days.  Exam is non focal, no reproducible abdominal tenderness, no CVA tenderness.    Labs without leukocytosis, anemia or electrolyte abnormalities.  UA negative for infection or blood.  CT with evidence of acute findings.  US did not show acute testicle findings.  Patient will be discharged with short course of norco and work excuse.  Symptoms likely related to wear of heavy gun belt.  Instructed to follow up with PCP.    DDX: hernia, epididymitis, diverticulitis, nephrolithiasis, IT band syndrome    Amount and/or Complexity of Data Reviewed  Labs: ordered.  Radiology: ordered.    Risk  Prescription drug management.                                      Clinical Impression:  Final diagnoses:  [R10.32] Left groin pain  [R93.89] Abnormal finding on imaging (Primary)          ED Disposition Condition    Discharge Stable          ED Prescriptions       Medication Sig Dispense Start Date End Date Auth. Provider    HYDROcodone-acetaminophen (NORCO) 5-325 mg per tablet Take 1 tablet by mouth every 6 (six) hours as needed. 12 tablet 6/22/2024 6/25/2024 Meggan Vazquez MD          Follow-up Information       Follow up With Specialties Details Why Contact Info    Jaison Tesfaye MD Family Medicine Schedule an appointment as soon as possible for a visit in 1 day As needed [address]  [facility] [address]  [phone]               Meggan Vazquez MD  06/22/24 2240

## 2024-06-22 NOTE — Clinical Note
"Shawn HANNAH Sr "Alli Hand was seen and treated in our emergency department on 6/22/2024.  He may return to work on 06/24/2024.  Medications that will show + for opiates have been prescribed for Mr. Hand     If you have any questions or concerns, please don't hesitate to call.      Meggan Vazquez MD"

## 2024-06-24 ENCOUNTER — OFFICE VISIT (OUTPATIENT)
Dept: FAMILY MEDICINE | Facility: CLINIC | Age: 69
End: 2024-06-24
Payer: COMMERCIAL

## 2024-06-24 ENCOUNTER — APPOINTMENT (OUTPATIENT)
Dept: RADIOLOGY | Facility: CLINIC | Age: 69
End: 2024-06-24
Attending: FAMILY MEDICINE
Payer: COMMERCIAL

## 2024-06-24 VITALS
HEIGHT: 70 IN | SYSTOLIC BLOOD PRESSURE: 124 MMHG | HEART RATE: 87 BPM | DIASTOLIC BLOOD PRESSURE: 62 MMHG | BODY MASS INDEX: 45.1 KG/M2 | RESPIRATION RATE: 17 BRPM | OXYGEN SATURATION: 96 % | WEIGHT: 315 LBS

## 2024-06-24 DIAGNOSIS — J44.1 CHRONIC OBSTRUCTIVE PULMONARY DISEASE WITH ACUTE EXACERBATION: ICD-10-CM

## 2024-06-24 DIAGNOSIS — M79.605 LEFT LEG PAIN: ICD-10-CM

## 2024-06-24 DIAGNOSIS — M16.0 PRIMARY OSTEOARTHRITIS OF BOTH HIPS: ICD-10-CM

## 2024-06-24 DIAGNOSIS — J43.9 PULMONARY EMPHYSEMA, UNSPECIFIED EMPHYSEMA TYPE: ICD-10-CM

## 2024-06-24 DIAGNOSIS — M25.552 LEFT HIP PAIN: Primary | ICD-10-CM

## 2024-06-24 DIAGNOSIS — G57.12 NEUROPATHIC PAIN INVOLVING LEFT LATERAL FEMORAL CUTANEOUS NERVE: ICD-10-CM

## 2024-06-24 DIAGNOSIS — F40.240 CLAUSTROPHOBIA: ICD-10-CM

## 2024-06-24 PROCEDURE — 3078F DIAST BP <80 MM HG: CPT | Mod: CPTII,S$GLB,, | Performed by: FAMILY MEDICINE

## 2024-06-24 PROCEDURE — 3008F BODY MASS INDEX DOCD: CPT | Mod: CPTII,S$GLB,, | Performed by: FAMILY MEDICINE

## 2024-06-24 PROCEDURE — 1101F PT FALLS ASSESS-DOCD LE1/YR: CPT | Mod: CPTII,S$GLB,, | Performed by: FAMILY MEDICINE

## 2024-06-24 PROCEDURE — 72100 X-RAY EXAM L-S SPINE 2/3 VWS: CPT | Mod: TC,PO

## 2024-06-24 PROCEDURE — 99999 PR PBB SHADOW E&M-EST. PATIENT-LVL V: CPT | Mod: PBBFAC,,, | Performed by: FAMILY MEDICINE

## 2024-06-24 PROCEDURE — 72100 X-RAY EXAM L-S SPINE 2/3 VWS: CPT | Mod: 26,,, | Performed by: RADIOLOGY

## 2024-06-24 PROCEDURE — 3074F SYST BP LT 130 MM HG: CPT | Mod: CPTII,S$GLB,, | Performed by: FAMILY MEDICINE

## 2024-06-24 PROCEDURE — 3288F FALL RISK ASSESSMENT DOCD: CPT | Mod: CPTII,S$GLB,, | Performed by: FAMILY MEDICINE

## 2024-06-24 PROCEDURE — 99214 OFFICE O/P EST MOD 30 MIN: CPT | Mod: S$GLB,,, | Performed by: FAMILY MEDICINE

## 2024-06-24 PROCEDURE — 1125F AMNT PAIN NOTED PAIN PRSNT: CPT | Mod: CPTII,S$GLB,, | Performed by: FAMILY MEDICINE

## 2024-06-24 PROCEDURE — 1159F MED LIST DOCD IN RCRD: CPT | Mod: CPTII,S$GLB,, | Performed by: FAMILY MEDICINE

## 2024-06-24 RX ORDER — ESOMEPRAZOLE MAGNESIUM 40 MG/1
40 CAPSULE, DELAYED RELEASE ORAL
Qty: 90 CAPSULE | Refills: 3 | Status: SHIPPED | OUTPATIENT
Start: 2024-06-24

## 2024-06-24 RX ORDER — GABAPENTIN 300 MG/1
300 CAPSULE ORAL 3 TIMES DAILY
Qty: 90 CAPSULE | Refills: 5 | Status: SHIPPED | OUTPATIENT
Start: 2024-06-24 | End: 2025-06-24

## 2024-06-24 RX ORDER — BUDESONIDE AND FORMOTEROL FUMARATE DIHYDRATE 160; 4.5 UG/1; UG/1
2 AEROSOL RESPIRATORY (INHALATION) EVERY 12 HOURS
Qty: 10.2 G | Refills: 11 | Status: SHIPPED | OUTPATIENT
Start: 2024-06-24 | End: 2025-06-24

## 2024-06-24 RX ORDER — ALPRAZOLAM 1 MG/1
1 TABLET ORAL
Qty: 1 TABLET | Refills: 0 | Status: SHIPPED | OUTPATIENT
Start: 2024-06-24 | End: 2024-07-24

## 2024-06-24 RX ORDER — DILTIAZEM HYDROCHLORIDE 360 MG/1
360 CAPSULE, EXTENDED RELEASE ORAL
COMMUNITY
Start: 2024-06-11

## 2024-06-24 RX ORDER — BUDESONIDE AND FORMOTEROL FUMARATE DIHYDRATE 160; 4.5 UG/1; UG/1
2 AEROSOL RESPIRATORY (INHALATION) EVERY 12 HOURS
Qty: 10.2 G | Refills: 5 | Status: SHIPPED | OUTPATIENT
Start: 2024-06-24 | End: 2025-06-24

## 2024-06-24 NOTE — PROGRESS NOTES
Subjective:       Patient ID: Shawn Hand is a 68 y.o. male.    Chief Complaint: Follow-up (Pt here for ER f/u. )    Pt is a 68 y.o. male who presents for evaluation and management of   Encounter Diagnoses   Name Primary?    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 Denies any side effects. Prevention is up to date.    Review of Systems   Musculoskeletal:  Positive for arthralgias and gait problem.   Neurological:  Positive for weakness and numbness.       Objective:      Physical Exam  Constitutional:       Appearance: Normal appearance. He is well-developed. He is not ill-appearing.   HENT:      Head: Normocephalic and atraumatic.      Right Ear: External ear normal.      Left Ear: External ear normal.      Nose: Nose normal.      Mouth/Throat:      Mouth: Mucous membranes are moist.      Pharynx: No oropharyngeal exudate or posterior oropharyngeal erythema.   Eyes:      General: No scleral icterus.        Right eye: No discharge.         Left eye: No discharge.      Conjunctiva/sclera: Conjunctivae normal.      Pupils: Pupils are equal, round, and reactive to light.   Neck:      Thyroid: No thyromegaly.      Vascular: No JVD.      Trachea: No tracheal deviation.   Cardiovascular:      Rate and Rhythm: Normal rate and regular rhythm.      Heart sounds: Normal heart sounds. No murmur heard.  Pulmonary:      Effort: Pulmonary effort is normal. No respiratory distress.      Breath sounds: Normal breath sounds. No wheezing or rales.   Chest:      Chest wall: No tenderness.   Abdominal:      General: Bowel sounds are normal. There is no distension.      Palpations: Abdomen is soft. There is no mass.      Tenderness: There is no abdominal  tenderness. There is no guarding or rebound.   Musculoskeletal:      Cervical back: Normal range of motion and neck supple.      Right lower leg: No edema.      Left lower leg: No edema.      Comments: Neg SLR   Anesthesia of left lateral thigh   Pain in left groin with int/ext rotation of hip    Lymphadenopathy:      Cervical: No cervical adenopathy.   Skin:     General: Skin is warm and dry.   Neurological:      Mental Status: He is alert and oriented to person, place, and time.      Cranial Nerves: No cranial nerve deficit.      Motor: No abnormal muscle tone.      Coordination: Coordination normal.      Deep Tendon Reflexes: Reflexes are normal and symmetric. Reflexes normal.   Psychiatric:         Behavior: Behavior normal.         Thought Content: Thought content normal.         Judgment: Judgment normal.         Assessment:       1. Left hip pain    2. Pulmonary emphysema, unspecified emphysema type    3. Left leg pain    4. Primary osteoarthritis of both hips    5. 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 Claustrophobia    7. Chronic obstructive pulmonary disease with acute exacerbation        Plan:   1. Left hip pain  -     Cancel: X-Ray Hip 1 View Left (with Pelvis when performed); Future; Expected date: 06/24/2024  -     X-Ray Hip 2 or 3 views Left with Pelvis when performed; Future; Expected date: 06/24/2024  -     MRI Hip Without Contrast Left; Future; Expected date: 06/24/2024    2. Pulmonary emphysema, unspecified emphysema type  Overview:  Symbicort       Orders:  -     budesonide-formoterol 160-4.5 mcg (SYMBICORT) 160-4.5 mcg/actuation HFAA; Inhale 2 puffs into the lungs every 12 (twelve) hours. Controller  Dispense: 10.2 g; Refill: 5    3. Left leg pain  -     X-Ray Lumbar Spine AP And Lateral; Future; Expected date: 06/24/2024    4. Primary osteoarthritis of both hips  -     MRI Hip Without Contrast Left; Future; Expected date: 06/24/2024    5. Neuropathic pain  involving left lateral femoral cutaneous nerve  -     gabapentin (NEURONTIN) 300 MG capsule; Take 1 capsule (300 mg total) by mouth 3 (three) times daily.  Dispense: 90 capsule; Refill: 5    6. Claustrophobia  -     ALPRAZolam (XANAX) 1 MG tablet; Take 1 tablet (1 mg total) by mouth On call Procedure for Anxiety (claustrophobia).  Dispense: 1 tablet; Refill: 0    7. Chronic obstructive pulmonary disease with acute exacerbation  -     SYMBICORT 160-4.5 mcg/actuation HFAA; Inhale 2 puffs into the lungs every 12 (twelve) hours. Controller  Dispense: 10.2 g; Refill: 11    Other orders  -     esomeprazole (NEXIUM) 40 MG capsule; Take 1 capsule (40 mg total) by mouth before breakfast.  Dispense: 90 capsule; Refill: 3      No follow-ups on file.

## 2024-06-25 ENCOUNTER — TELEPHONE (OUTPATIENT)
Dept: FAMILY MEDICINE | Facility: CLINIC | Age: 69
End: 2024-06-25
Payer: COMMERCIAL

## 2024-06-25 NOTE — TELEPHONE ENCOUNTER
----- Message from Mark Watt sent at 2024  4:48 PM CDT -----  Contact: Brylee Charles CAMDEN Hand  MRN: [id number]  : 1955  PCP: Jaison Tesfaye  Home Phone      [phone]  Work Phone      Not on file.  Mobile          [phone]      MESSAGE:     Brylee with [facility] called wanting to speak with nurse about medication SYMBICORT 160-4.5 mcg/actuation HFAA. They need a PA in order to fill the medication through them.        Please advise  Brylee  [phone]

## 2024-06-27 ENCOUNTER — TELEPHONE (OUTPATIENT)
Dept: BARIATRICS | Facility: CLINIC | Age: 69
End: 2024-06-27
Payer: COMMERCIAL

## 2024-07-08 ENCOUNTER — HOSPITAL ENCOUNTER (OUTPATIENT)
Dept: RADIOLOGY | Facility: HOSPITAL | Age: 69
Discharge: HOME | End: 2024-07-08
Attending: FAMILY MEDICINE
Payer: COMMERCIAL

## 2024-07-08 DIAGNOSIS — M25.552 LEFT HIP PAIN: ICD-10-CM

## 2024-07-08 DIAGNOSIS — M16.0 PRIMARY OSTEOARTHRITIS OF BOTH HIPS: ICD-10-CM

## 2024-07-08 PROCEDURE — 73721 MRI JNT OF LWR EXTRE W/O DYE: CPT | Mod: TC,LT

## 2024-07-08 PROCEDURE — 73721 MRI JNT OF LWR EXTRE W/O DYE: CPT | Mod: 26,LT,, | Performed by: RADIOLOGY

## 2024-07-09 ENCOUNTER — PATIENT OUTREACH (OUTPATIENT)
Dept: ADMINISTRATIVE | Facility: HOSPITAL | Age: 69
End: 2024-07-09
Payer: COMMERCIAL

## 2024-07-09 NOTE — PROGRESS NOTES
Received external Lipid Panel collected/completed on 12/04/2023, updated to .  Uploaded Colonoscopy completed on 12/14/2016, updated to .

## 2024-07-10 ENCOUNTER — TELEPHONE (OUTPATIENT)
Dept: FAMILY MEDICINE | Facility: CLINIC | Age: 69
End: 2024-07-10
Payer: COMMERCIAL

## 2024-07-10 DIAGNOSIS — G57.12 NEUROPATHIC PAIN INVOLVING LEFT LATERAL FEMORAL CUTANEOUS NERVE: ICD-10-CM

## 2024-07-10 DIAGNOSIS — M16.0 PRIMARY OSTEOARTHRITIS OF BOTH HIPS: Primary | ICD-10-CM

## 2024-07-10 RX ORDER — GABAPENTIN 300 MG/1
600 CAPSULE ORAL 3 TIMES DAILY
Qty: 180 CAPSULE | Refills: 11 | Status: SHIPPED | OUTPATIENT
Start: 2024-07-10 | End: 2025-07-10

## 2024-07-10 NOTE — TELEPHONE ENCOUNTER
----- Message from Luz Maria Ludwig sent at 7/10/2024  9:17 AM CDT -----  Contact: Sarah-wife  Shawn Hand  MRN: [id number]  : 1955  PCP: Jaison Tesfaye  Home Phone      [phone]  Work Phone      Not on file.  Mobile          [phone]      MESSAGE:   Patient needs the quantity of the Gabapentin increased as well as a refill sent to the [facility].    Please advise    LOV 24      [phone]

## 2024-07-10 NOTE — TELEPHONE ENCOUNTER
Pt requesting to increase current dosage of Gabapentin 300 mg , 1 tab TID. Refill needing sent to [facility] in [place].    Please advise. Thanks

## 2024-07-10 NOTE — PROGRESS NOTES
He has DJD in his hip and some pulled muscles in there as well. I am putting in a referral to a hip specialists, Dr. Jordan. He may be able to help him with a joint injection in the hip. I dont think he is in need of a hip replacement yet.

## 2024-07-12 ENCOUNTER — TELEPHONE (OUTPATIENT)
Dept: FAMILY MEDICINE | Facility: CLINIC | Age: 69
End: 2024-07-12

## 2024-07-12 NOTE — TELEPHONE ENCOUNTER
Shawn HANNAH Sr Yazan  MRN: [id number]  : 1955  PCP: Jaison Tesfaye  Home Phone [phone]   Work Phone Not on file.   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      Please advise    Phone [phone]

## 2024-07-17 ENCOUNTER — TELEPHONE (OUTPATIENT)
Dept: FAMILY MEDICINE | Facility: CLINIC | Age: 69
End: 2024-07-17
Payer: COMMERCIAL

## 2024-07-28 ENCOUNTER — PATIENT MESSAGE (OUTPATIENT)
Dept: FAMILY MEDICINE | Facility: CLINIC | Age: 69
End: 2024-07-28
Payer: COMMERCIAL

## 2024-07-28 DIAGNOSIS — F40.240 CLAUSTROPHOBIA: Primary | ICD-10-CM

## 2024-07-29 RX ORDER — ALPRAZOLAM 1 MG/1
1 TABLET ORAL
Qty: 1 TABLET | Refills: 0 | Status: SHIPPED | OUTPATIENT
Start: 2024-07-29 | End: 2024-08-28

## 2024-07-31 ENCOUNTER — HOSPITAL ENCOUNTER (OUTPATIENT)
Dept: RADIOLOGY | Facility: HOSPITAL | Age: 69
Discharge: HOME | End: 2024-07-31
Attending: CHIROPRACTOR
Payer: COMMERCIAL

## 2024-07-31 DIAGNOSIS — M54.16 LUMBAR RADICULOPATHY: ICD-10-CM

## 2024-07-31 PROCEDURE — 72148 MRI LUMBAR SPINE W/O DYE: CPT | Mod: TC

## 2024-08-05 ENCOUNTER — OFFICE VISIT (OUTPATIENT)
Dept: FAMILY MEDICINE | Facility: CLINIC | Age: 69
End: 2024-08-05
Payer: COMMERCIAL

## 2024-08-05 VITALS
DIASTOLIC BLOOD PRESSURE: 64 MMHG | HEIGHT: 70 IN | RESPIRATION RATE: 17 BRPM | OXYGEN SATURATION: 96 % | SYSTOLIC BLOOD PRESSURE: 124 MMHG | BODY MASS INDEX: 45.1 KG/M2 | HEART RATE: 89 BPM | WEIGHT: 315 LBS

## 2024-08-05 DIAGNOSIS — M48.062 SPINAL STENOSIS OF LUMBAR REGION WITH NEUROGENIC CLAUDICATION: Primary | ICD-10-CM

## 2024-08-05 DIAGNOSIS — M47.22 OSTEOARTHRITIS OF SPINE WITH RADICULOPATHY, CERVICAL REGION: ICD-10-CM

## 2024-08-05 PROCEDURE — 1125F AMNT PAIN NOTED PAIN PRSNT: CPT | Mod: CPTII,S$GLB,, | Performed by: FAMILY MEDICINE

## 2024-08-05 PROCEDURE — 3074F SYST BP LT 130 MM HG: CPT | Mod: CPTII,S$GLB,, | Performed by: FAMILY MEDICINE

## 2024-08-05 PROCEDURE — 1160F RVW MEDS BY RX/DR IN RCRD: CPT | Mod: CPTII,S$GLB,, | Performed by: FAMILY MEDICINE

## 2024-08-05 PROCEDURE — 3008F BODY MASS INDEX DOCD: CPT | Mod: CPTII,S$GLB,, | Performed by: FAMILY MEDICINE

## 2024-08-05 PROCEDURE — 99213 OFFICE O/P EST LOW 20 MIN: CPT | Mod: S$GLB,,, | Performed by: FAMILY MEDICINE

## 2024-08-05 PROCEDURE — 3288F FALL RISK ASSESSMENT DOCD: CPT | Mod: CPTII,S$GLB,, | Performed by: FAMILY MEDICINE

## 2024-08-05 PROCEDURE — 1101F PT FALLS ASSESS-DOCD LE1/YR: CPT | Mod: CPTII,S$GLB,, | Performed by: FAMILY MEDICINE

## 2024-08-05 PROCEDURE — 99999 PR PBB SHADOW E&M-EST. PATIENT-LVL V: CPT | Mod: PBBFAC,,, | Performed by: FAMILY MEDICINE

## 2024-08-05 PROCEDURE — 3078F DIAST BP <80 MM HG: CPT | Mod: CPTII,S$GLB,, | Performed by: FAMILY MEDICINE

## 2024-08-05 PROCEDURE — 1159F MED LIST DOCD IN RCRD: CPT | Mod: CPTII,S$GLB,, | Performed by: FAMILY MEDICINE

## 2024-08-05 RX ORDER — TRAMADOL HYDROCHLORIDE 50 MG/1
50 TABLET ORAL NIGHTLY PRN
Qty: 30 EACH | Refills: 0 | Status: SHIPPED | OUTPATIENT
Start: 2024-08-05

## 2024-08-12 ENCOUNTER — OFFICE VISIT (OUTPATIENT)
Dept: PAIN MEDICINE | Facility: CLINIC | Age: 69
End: 2024-08-12
Payer: COMMERCIAL

## 2024-08-12 ENCOUNTER — TELEPHONE (OUTPATIENT)
Dept: PAIN MEDICINE | Facility: CLINIC | Age: 69
End: 2024-08-12
Payer: COMMERCIAL

## 2024-08-12 VITALS — WEIGHT: 315 LBS | BODY MASS INDEX: 45.57 KG/M2

## 2024-08-12 DIAGNOSIS — M47.816 LUMBAR FACET ARTHROPATHY: ICD-10-CM

## 2024-08-12 DIAGNOSIS — M25.552 LEFT HIP PAIN: Primary | ICD-10-CM

## 2024-08-12 DIAGNOSIS — M54.16 LUMBAR RADICULITIS: ICD-10-CM

## 2024-08-12 DIAGNOSIS — G89.29 CHRONIC LEFT HIP PAIN: Primary | ICD-10-CM

## 2024-08-12 DIAGNOSIS — M25.552 CHRONIC LEFT HIP PAIN: Primary | ICD-10-CM

## 2024-08-12 PROCEDURE — 3008F BODY MASS INDEX DOCD: CPT | Mod: CPTII,S$GLB,, | Performed by: ANESTHESIOLOGY

## 2024-08-12 PROCEDURE — 99204 OFFICE O/P NEW MOD 45 MIN: CPT | Mod: S$GLB,,, | Performed by: ANESTHESIOLOGY

## 2024-08-12 PROCEDURE — 1101F PT FALLS ASSESS-DOCD LE1/YR: CPT | Mod: CPTII,S$GLB,, | Performed by: ANESTHESIOLOGY

## 2024-08-12 PROCEDURE — 1159F MED LIST DOCD IN RCRD: CPT | Mod: CPTII,S$GLB,, | Performed by: ANESTHESIOLOGY

## 2024-08-12 PROCEDURE — 3288F FALL RISK ASSESSMENT DOCD: CPT | Mod: CPTII,S$GLB,, | Performed by: ANESTHESIOLOGY

## 2024-08-12 PROCEDURE — 99999 PR PBB SHADOW E&M-EST. PATIENT-LVL IV: CPT | Mod: PBBFAC,,, | Performed by: ANESTHESIOLOGY

## 2024-08-12 PROCEDURE — 1160F RVW MEDS BY RX/DR IN RCRD: CPT | Mod: CPTII,S$GLB,, | Performed by: ANESTHESIOLOGY

## 2024-08-12 NOTE — PROGRESS NOTES
New patient evaluation  [facility]    Shawn Hand  : 1955  Date: 2024     CHIEF COMPLAINT:  No chief complaint on file.    Referring Physician: Jaison Tesfaye MD  Primary Care Physician: Jaison Tesfaye MD    HPI:  This is a 68 y.o. male with a chief complaint of No chief complaint on file.  . The patient has Past medical history/Past surgical history of  COPD, hypertension, obesity, acid reflux    Patient was evaluated and referred by  primary care provider for low back pain   MRI lumbar spine completed in  showed multilevel degenerative change of the lumbar spine, multilevel facet arthropathy,  most significant at L5-S1 with moderate central canal stenosis and moderate bilateral neural foramina narrowing.  MRI bilateral hip showed moderate degenerative change of the left hip  Diabetic: {GAYes/No/NA:00385}    {Anticogualtion drugs:49889}    Allergy To Iodine: {GAYes/No/NA:42787}    Currently on Antibiotic: {GAYes/No/NA:00463}    Current Description of Pain Symptoms:    History of Recent Fall or Trauma: {GAYes/No/NA:78617}   Onset: Chronic, started ***  Pain Location: ***  Radiates/associated symptoms: ***.   Pain is Getting worse over the last *** months    The pain is described as {Desc; pain character:81629}.   Exacerbating factors: {Causes; Pain:13627}.   Mitigating factors ***.   Symptoms interfere with daily activity, sleeping, and ***.   The patient feels like symptoms have been {IUW:64718}.   Patient {Denies / Reports:41528} {RED FLAGS:97912}.    Pain score:   Current: {PAIN 0-10:21060}/10  Best: {PAIN 0-10:82092}/10  Worst: {PAIN 0-10:98779}/10    Current pain medications:      gabapentin (NEURONTIN) 300 MG capsule,     traMADoL (ULTRAM) 50 mg tablet,     Current Narcotics/Opioid /benzo Medications:  Opioids- {GAopioid:26579}  Benzodiazepines: No    UDS:  NA    PDMP:  Reviewed and consistent with medication use as prescribed.     Previous  Chronic Pain Treatment History:  Physical Therapy/HEP/Physician Lead Exercise Program:  Over the past 12 months, Patient has done  *** sessions.  PT response: {PT response:15143} Helpful.   Dates of the PT sessions: ***, ***.  Is patient participating in home exercise program (HEP)/ physician led exercise program: {GAYes/No/NA:11338}.    Non-interventional Pain Therapy:  []Chiropractor.   []Acupuncture/Dry needle.  []TENS unit.  []Heat/ICE.  []Back Brace.    Medications previously tried:  NSAIDs: {GANSIAD:59235}  Topical Agent: {GAYes/No/NA:48244}  TCA/SSRI/SNRI: {GATCA/SSRI/SNRI:26892}  Anti-convulsants: {GAAnticonvulsants:69410}  Muscle Relaxants: {GAmuscle Relaxant:84225}  Opioids- {GAopioid:78979}.    Interventional Pain Procedures:  ***    Previous spine/Relevant joint surgery:   Bilateral rotator cuff surgery  Surgical History:   has a past surgical history that includes Rotator cuff repair (Left); Cholecystectomy; Tonsillectomy; Coronary angioplasty with stent; Colonoscopy; Sinus surgery; Cardioversion; and Rotator cuff repair (Right, 06/27/2017).  Medical History:   has a past medical history of Atrial fibrillation, COPD (chronic obstructive pulmonary disease), Coronary artery disease, GERD (gastroesophageal reflux disease), Hyperlipidemia, Hypertension, and Sleep apnea.  Family History:  family history includes Asthma in his mother; COPD in his mother; Heart disease in his father.  Allergies:  Niaspan extended-release [niacin], Amoxicillin-pot clavulanate, and Nystatin   Social History/SUBSTANCE ABUSE HISTORY:  Personal history of substance abuse: No   reports that he quit smoking about 9 years ago. His smoking use included cigarettes. He has been exposed to tobacco smoke. He has never used smokeless tobacco. He reports that he does not currently use alcohol. He reports that he does not use drugs.  LABS:  CBC  Lab Results   Component Value Date    WBC 10.01 06/22/2024    HGB 16.6 06/22/2024    HCT 49.5  "06/22/2024     Coagulation Profile   Lab Results   Component Value Date     06/22/2024     No results found for: "PT", "PTT", "INR"  CMP:  BMP  Lab Results   Component Value Date     06/22/2024    K 4.3 06/22/2024     06/22/2024    CO2 26 06/22/2024    BUN 19 06/22/2024    CREATININE 1.0 06/22/2024    CALCIUM 10.0 06/22/2024    ANIONGAP 11 06/22/2024    EGFRNORACEVR >60 06/22/2024     Lab Results   Component Value Date    ALT 29 06/22/2024    AST 25 06/22/2024    ALKPHOS 47 (L) 06/22/2024    BILITOT 0.5 06/22/2024     HGBA1C:  Lab Results   Component Value Date    HGBA1C 5.5 08/11/2023       ROS:    Review of Systems   GENERAL:  No weight loss, malaise or fevers.  HEENT:   No recent changes in vision or hearing  NECK:  Negative for lumps, no difficulty with swallowing.  RESPIRATORY:  Negative for cough, wheezing or shortness of breath, patient denies any recent URI.  CARDIOVASCULAR:  Negative for chest pain or palpitations.  GI:  Negative for abdominal discomfort, blood in stools or black stools or change in bowel habits.  MUSCULOSKELETAL:  See HPI.  SKIN:  Negative for lesions, rash, and itching.  PSYCH:  No mood disorder or recent psychosocial stressors.   HEMATOLOGY/LYMPHOLOGY:  See the blood thinner sectioned in HPI.  NEURO:  See HPI  All other reviewed and negative other than HPI.    PHYSICAL EXAM:  VITALS: There were no vitals taken for this visit.  There is no height or weight on file to calculate BMI.  GENERAL: Well appearing, in no acute distress, alert and oriented x3, answers questions appropriately.   PSYCH: Flat affect.  SKIN: Skin color, texture, turgor normal, no rashes or lesions.  HEAD/FACE:  Normocephalic, atraumatic. Cranial nerves grossly intact.  CV: Regular rate  PULM: No evidence of respiratory difficulty, symmetric chest rise.  GI:  Soft and non-Distended.  BACK/SIJ/HIP:  Lumbar Spine Exam:       Inspection: No erythema, bruising.       Palpation: (***) TTP of lumbar " paraspinals bilaterally      ROM:  Limited in flexion, extension, lateral bending.       (***) Facet loading bilaterally      (***) Straight Leg Raise bilaterally      (***) JIM bilaterally, Tenderness over the PSIS, Yeoman test  Hip Exam:      Inspection: No gross deformity or apparent leg length discrepancy      Palpation:  No TTP to bilateral greater trochanteric bursas.       ROM:  No limitation or pain in internal rotation, external rotation b/l  Neurologic Exam:     Alert. Speech is fluent and appropriate.     Strength: ***/5 in *** hip flexion and knee extension     Sensation:  Grossly intact to light touch in bilateral lower extremities     Tone: No abnormality appreciated in bilateral lower extremities    GAIT: ***    DIAGNOSTIC STUDIES AND MEDICAL RECORDS REVIEW:  I have personally reviewed and interpreted relevant radiology reports and reviewed relevant records from other services in the EMR.   MRI lumbar spine 2024      At T12-L1, no disc herniation, central canal stenosis or neural foraminal narrowing.     At L1-2, no disc herniation, central canal stenosis or neural foraminal narrowing.     At L2-3 circumferential disc bulge with facet arthropathy contributing to mild left neural foraminal narrowing.  No central canal stenosis.     At L3-4, circumferential disc bulge extending into both neural foramina with facet arthropathy contributing to mild central canal stenosis with mild left neural foraminal narrowing.     At L4-5, circumferential disc bulge with ligamentum flavum hypertrophy and facet arthropathy contributing to minimal central canal stenosis without neural foraminal narrowing.     At L5-S1, circumferential disc bulge extending into both neural foramina with osseous spurring in the neural foramina and facet arthropathy contributing to moderate central canal stenosis with moderate bilateral neural foraminal narrowing, worse on the right.    MRI bilateral hip 2024  No acetabular labral tear is  "visible on this non-arthrographic exam.     The bones exhibit normal marrow signal without evidence for fracture, stress fracture, or osseous contusion.     The right hip demonstrates moderate chondral thinning without high-grade chondral defect identified.  There is mild subchondral cystic change of the acetabular roof.    There are low-grade strains of the left gluteus medius and minimus tendons at their greater trochanteric insertions.     Impression:     Moderate degenerative change of the left hip.  Low-grade strains of the left gluteus medius and minimus tendons.    Clinical Impression:  This is a pleasant 68 y.o. male patient with PMH/PSH of ***, presenting with***.     We discussed the underlying diagnoses and multiple treatment options including non-opioid medications, interventional procedures, physical therapy, home exercise, core muscle enhancement, and weight loss.  The risks and benefits of each treatment option were discussed and all questions were answered.      Encounter Diagnosis:  Shawn Hand is a 68 y.o. male with the following diagnoses based on history, exam, and imaging:  There are no diagnoses linked to this encounter.   ---------------------------------------------------------------------      Treatment Plan:    Diagnostics/Referrals: {gaimage:15694}    Medications:    NSAIDs: {GANSIAD:83010}  Topical Agent: {GAYes/No/NA:44483}  TCA/SSRI/SNRI: {GATCA/SSRI/SNRI:40405}  Anti-convulsants: {GAAnticonvulsants:35264}  Muscle Relaxants: {GAmuscle Relaxant:35046}  Opioids: {GAopioid:35691::"None"}  Patient was educated about the risk and benefit of chronic opioid therapy including dependency, addiction, diversion, and opioid hyperalgesia.  Interventional Therapy: {GAProcedure:65325}.  Sedation: {GAsedation:40988}.   {Anticogualtion drugs:09494}-Clearance to stop Blood thinner:***     Regarding the above interventions, the patient has been educated regarding the risks (including bleeding, " infection, increased pain, nerve damage, or allergic reaction), benefits, and alternatives. The patient states he understands and is eager to proceed.    Physical Rehabilitation: {GAPT:32876}    Patient Education: Counseled patient regarding the importance of {:47190}, I have stressed the importance of physical activity and a home exercise plan to help with pain and improve health.    Follow-up: RTC ***.    May consider:     I would like to thank Jaison Tesfaye MD for the opportunity to assist in the care of this patient. We had a very nice visit and I look forward to continuing their care. Please let me know if I can be of further assistance.     Stu Reed MD  Anesthesiologist  Interventional Pain Medicine  08/12/2024    Disclaimer:  This note was prepared using voice recognition system and is likely to have sound alike errors that may have been overlooked even after proof reading.  Please call me with any questions.

## 2024-08-12 NOTE — PROGRESS NOTES
New patient evaluation  [facility]    Shawn Hand  : 1955  Date: 2024     CHIEF COMPLAINT:  Back Pain    Referring Physician: Jaison Tesfaye MD  Primary Care Physician: Jaison Tesfaye MD    HPI:  This is a 68 y.o. male with a chief complaint of Back Pain  . The patient has Past medical history/Past surgical history of  COPD, hypertension, obesity, acid reflux, long-term anticoagulation on Eliquis due to AFib    Patient was evaluated and referred by  primary care provider for low back pain,  left hip pain and left thigh pain.   MRI lumbar spine completed in  showed multilevel degenerative change of the lumbar spine, multilevel facet arthropathy,  most significant at L5-S1 with moderate central canal stenosis and moderate bilateral neural foramina narrowing.  MRI bilateral hip showed moderate degenerative change of the left hip.    Diabetic: No    Anticogualtion drugs: Eliquis    Allergy To Iodine: No    Currently on Antibiotic: No    Current Description of Pain Symptoms:    History of Recent Fall or Trauma: No   Onset: Chronic, started a few years but unable vinnie walk without cane starting on 2024  Pain Location: lower back  Radiates/associated symptoms: radiates down the left thigh,  in addition to decreased sensation to the anterior aspect of the left thigh, tenderness over the left GTB   Pain is Getting worse over the last 2 months    The pain is described as aching, burning, numbing, stabbing, and tingling.   Exacerbating factors:  sitting for a long period of time standing for a long period of time  Mitigating factors Nothing specific.   Symptoms interfere with daily activity, sleeping, and work([employer] office, back to light duty).   The patient feels like symptoms have been worsening.   Patient denies night fever/night sweats, urinary incontinence, bowel incontinence, significant weight loss, significant motor weakness, and loss of  sensations.    Pain score:   Current: 7/10  Best: 4/10  Worst: 8/10    Current pain medications:      traMADoL (ULTRAM) 50 mg tablet, PRN    Current Narcotics/Opioid /benzo Medications:  Opioids- Tramadol (Ultram)  Benzodiazepines: No    UDS:  NA    PDMP:  Reviewed and consistent with medication use as prescribed.     Previous Chronic Pain Treatment History:  Physical Therapy/HEP/Physician Lead Exercise Program:  Over the past 12 months, Patient has done 0 sessions.  PT response: NA  Dates of the PT sessions: NA  Is patient participating in home exercise program (HEP)/ physician led exercise program: No.    Non-interventional Pain Therapy:  [x]Chiropractor: 2024, 15 sessions in July and August 2024  []Acupuncture/Dry needle.  [x]TENS unit.  []Heat/ICE.  []Back Brace.    Medications previously tried:  NSAIDs: None  Topical Agent: Yes  TCA/SSRI/SNRI: None  Anti-convulsants: Gabapentin  due to SE and weight gain.  Muscle Relaxants: None  Opioids- Hydrocodone with Acetaminophen (Norco) and Tramadol (Ultram).    Interventional Pain Procedures:  Left Hip GTB Injection [id number]- mild relief    Previous spine/Relevant joint surgery:   Bilateral rotator cuff surgery  Surgical History:   has a past surgical history that includes Rotator cuff repair (Left); Cholecystectomy; Tonsillectomy; Coronary angioplasty with stent; Colonoscopy; Sinus surgery; Cardioversion; and Rotator cuff repair (Right, 06/27/2017).  Medical History:   has a past medical history of Atrial fibrillation, COPD (chronic obstructive pulmonary disease), Coronary artery disease, GERD (gastroesophageal reflux disease), Hyperlipidemia, Hypertension, and Sleep apnea.  Family History:  family history includes Asthma in his mother; COPD in his mother; Heart disease in his father.  Allergies:  Niaspan extended-release [niacin], Amoxicillin-pot clavulanate, and Nystatin   Social History/SUBSTANCE ABUSE HISTORY:  Personal history of substance abuse: No   reports that  "he quit smoking about 9 years ago. His smoking use included cigarettes. He has been exposed to tobacco smoke. He has never used smokeless tobacco. He reports that he does not currently use alcohol. He reports that he does not use drugs.  LABS:  CBC  Lab Results   Component Value Date    WBC 10.01 06/22/2024    HGB 16.6 06/22/2024    HCT 49.5 06/22/2024     Coagulation Profile   Lab Results   Component Value Date     06/22/2024     No results found for: "PT", "PTT", "INR"  CMP:  BMP  Lab Results   Component Value Date     06/22/2024    K 4.3 06/22/2024     06/22/2024    CO2 26 06/22/2024    BUN 19 06/22/2024    CREATININE 1.0 06/22/2024    CALCIUM 10.0 06/22/2024    ANIONGAP 11 06/22/2024    EGFRNORACEVR >60 06/22/2024     Lab Results   Component Value Date    ALT 29 06/22/2024    AST 25 06/22/2024    ALKPHOS 47 (L) 06/22/2024    BILITOT 0.5 06/22/2024     HGBA1C:  Lab Results   Component Value Date    HGBA1C 5.5 08/11/2023       ROS:    Review of Systems   GENERAL:  No weight loss, malaise or fevers.  HEENT:   No recent changes in vision or hearing  NECK:  Negative for lumps, no difficulty with swallowing.  RESPIRATORY:  Negative for cough, wheezing or shortness of breath, patient denies any recent URI.  CARDIOVASCULAR:  Negative for chest pain or palpitations.  GI:  Negative for abdominal discomfort, blood in stools or black stools or change in bowel habits.  MUSCULOSKELETAL:  See HPI.  SKIN:  Negative for lesions, rash, and itching.  PSYCH:  No mood disorder or recent psychosocial stressors.   HEMATOLOGY/LYMPHOLOGY:  See the blood thinner sectioned in HPI.  NEURO:  See HPI  All other reviewed and negative other than HPI.    PHYSICAL EXAM:  VITALS: Wt (!) 144.1 kg (317 lb 9.6 oz)   BMI 45.57 kg/m²   Body mass index is 45.57 kg/m².  GENERAL: Well appearing, in no acute distress, alert and oriented x3, answers questions appropriately.   PSYCH: Flat affect.  SKIN: Skin color, texture, turgor " normal, no rashes or lesions.  HEAD/FACE:  Normocephalic, atraumatic. Cranial nerves grossly intact.  CV: Regular rate  PULM: No evidence of respiratory difficulty, symmetric chest rise.  GI:  Soft and non-Distended.  BACK/SIJ/HIP:  Lumbar Spine Exam:       Inspection: No erythema, bruising.       Palpation: (+) TTP of lumbar paraspinals bilaterally      ROM:  Limited in flexion, extension, lateral bending.       (+) Facet loading bilaterally      (NA) Straight Leg Raise bilaterally      (NA) JIM bilaterally, Tenderness over the PSIS, Yeoman test  Hip Exam:      Inspection: No gross deformity or apparent leg length discrepancy      Palpation:  No TTP to bilateral greater trochanteric bursas.       ROM:  No limitation or pain in internal rotation, external rotation b/l  Neurologic Exam:     Alert. Speech is fluent and appropriate.     Strength: 3/5 in  left hip flexion and knee extension     Sensation:   decreased sensation to the left lower extremity      Tone: No abnormality appreciated in bilateral lower extremities  GAIT: Antalgic gait, unsteady gait, using cane    DIAGNOSTIC STUDIES AND MEDICAL RECORDS REVIEW:  I have personally reviewed and interpreted relevant radiology reports and reviewed relevant records from other services in the EMR.   MRI lumbar spine 2024      At T12-L1, no disc herniation, central canal stenosis or neural foraminal narrowing.     At L1-2, no disc herniation, central canal stenosis or neural foraminal narrowing.     At L2-3 circumferential disc bulge with facet arthropathy contributing to mild left neural foraminal narrowing.  No central canal stenosis.     At L3-4, circumferential disc bulge extending into both neural foramina with facet arthropathy contributing to mild central canal stenosis with mild left neural foraminal narrowing.     At L4-5, circumferential disc bulge with ligamentum flavum hypertrophy and facet arthropathy contributing to minimal central canal stenosis without  neural foraminal narrowing.     At L5-S1, circumferential disc bulge extending into both neural foramina with osseous spurring in the neural foramina and facet arthropathy contributing to moderate central canal stenosis with moderate bilateral neural foraminal narrowing, worse on the right.    MRI bilateral hip   No acetabular labral tear is visible on this non-arthrographic exam.     The bones exhibit normal marrow signal without evidence for fracture, stress fracture, or osseous contusion.     The right hip demonstrates moderate chondral thinning without high-grade chondral defect identified.  There is mild subchondral cystic change of the acetabular roof.    There are low-grade strains of the left gluteus medius and minimus tendons at their greater trochanteric insertions.     Impression:     Moderate degenerative change of the left hip.  Low-grade strains of the left gluteus medius and minimus tendons.    Clinical Impression:  This is a pleasant 68 y.o. male patient with PMH/PSH of COPD, hypertension, obesity, acid reflux, long-term anticoagulation on Eliquis due to AFib, presenting with left hip pain due to moderate degenerative changes of the left hip, he also reported decreased sensation over the left thigh possible related to the lateral femoral cutaneous nerve entrapment.     Encounter Diagnosis:  Shawn Hand is a 68 y.o. male with the following diagnoses based on history, exam, and imagin. Chronic left hip pain  - Ambulatory referral/consult to Pain Clinic    2. Lumbar facet arthropathy    3. Lumbar radiculitis   ---------------------------------------------------------    Treatment Plan:    Diagnostics/Referrals: None    Medications:    NSAIDs: None  Topical Agent: No  TCA/SSRI/SNRI: None  Anti-convulsants: None  Muscle Relaxants: None  Opioids: tramadol Per PCP    Interventional Therapy:  Left hip injection and Left GTB  Sedation: Oral Sedation.   Anticogualtion drugs: Eliquis-Clearance  to stop Blood thinner:NA     Regarding the above interventions, the patient has been educated regarding the risks (including bleeding, infection, increased pain, nerve damage, or allergic reaction), benefits, and alternatives. The patient states he understands and is eager to proceed.    Physical Rehabilitation: Physician led exercise program and home exercise    Patient Education: Counseled patient regarding the importance of activity modification, I have stressed the importance of physical activity and a home exercise plan to help with pain and improve health.    Follow-up: RTC 4 weeks to discuss Hip block, LESI.    May consider: Left LFCN block vs LESI, Hip ablation     I would like to thank Jaison Tesfaye MD for the opportunity to assist in the care of this patient. We had a very nice visit and I look forward to continuing their care. Please let me know if I can be of further assistance.     Stu Reed MD  Anesthesiologist  Interventional Pain Medicine  08/12/2024    Disclaimer:  This note was prepared using voice recognition system and is likely to have sound alike errors that may have been overlooked even after proof reading.  Please call me with any questions.

## 2024-08-12 NOTE — H&P (VIEW-ONLY)
New patient evaluation  [facility]    Shawn Hand  : 1955  Date: 2024     CHIEF COMPLAINT:  Back Pain    Referring Physician: Jaison Tesfaye MD  Primary Care Physician: Jaison Tesfaye MD    HPI:  This is a 68 y.o. male with a chief complaint of Back Pain  . The patient has Past medical history/Past surgical history of  COPD, hypertension, obesity, acid reflux, long-term anticoagulation on Eliquis due to AFib    Patient was evaluated and referred by  primary care provider for low back pain,  left hip pain and left thigh pain.   MRI lumbar spine completed in  showed multilevel degenerative change of the lumbar spine, multilevel facet arthropathy,  most significant at L5-S1 with moderate central canal stenosis and moderate bilateral neural foramina narrowing.  MRI bilateral hip showed moderate degenerative change of the left hip.    Diabetic: No    Anticogualtion drugs: Eliquis    Allergy To Iodine: No    Currently on Antibiotic: No    Current Description of Pain Symptoms:    History of Recent Fall or Trauma: No   Onset: Chronic, started a few years but unable vinnie walk without cane starting on 2024  Pain Location: lower back  Radiates/associated symptoms: radiates down the left thigh,  in addition to decreased sensation to the anterior aspect of the left thigh, tenderness over the left GTB   Pain is Getting worse over the last 2 months    The pain is described as aching, burning, numbing, stabbing, and tingling.   Exacerbating factors:  sitting for a long period of time standing for a long period of time  Mitigating factors Nothing specific.   Symptoms interfere with daily activity, sleeping, and work([employer] office, back to light duty).   The patient feels like symptoms have been worsening.   Patient denies night fever/night sweats, urinary incontinence, bowel incontinence, significant weight loss, significant motor weakness, and loss of  sensations.    Pain score:   Current: 7/10  Best: 4/10  Worst: 8/10    Current pain medications:      traMADoL (ULTRAM) 50 mg tablet, PRN    Current Narcotics/Opioid /benzo Medications:  Opioids- Tramadol (Ultram)  Benzodiazepines: No    UDS:  NA    PDMP:  Reviewed and consistent with medication use as prescribed.     Previous Chronic Pain Treatment History:  Physical Therapy/HEP/Physician Lead Exercise Program:  Over the past 12 months, Patient has done 0 sessions.  PT response: NA  Dates of the PT sessions: NA  Is patient participating in home exercise program (HEP)/ physician led exercise program: No.    Non-interventional Pain Therapy:  [x]Chiropractor: 2024, 15 sessions in July and August 2024  []Acupuncture/Dry needle.  [x]TENS unit.  []Heat/ICE.  []Back Brace.    Medications previously tried:  NSAIDs: None  Topical Agent: Yes  TCA/SSRI/SNRI: None  Anti-convulsants: Gabapentin  due to SE and weight gain.  Muscle Relaxants: None  Opioids- Hydrocodone with Acetaminophen (Norco) and Tramadol (Ultram).    Interventional Pain Procedures:  Left Hip GTB Injection 790530- mild relief    Previous spine/Relevant joint surgery:   Bilateral rotator cuff surgery  Surgical History:   has a past surgical history that includes Rotator cuff repair (Left); Cholecystectomy; Tonsillectomy; Coronary angioplasty with stent; Colonoscopy; Sinus surgery; Cardioversion; and Rotator cuff repair (Right, 06/27/2017).  Medical History:   has a past medical history of Atrial fibrillation, COPD (chronic obstructive pulmonary disease), Coronary artery disease, GERD (gastroesophageal reflux disease), Hyperlipidemia, Hypertension, and Sleep apnea.  Family History:  family history includes Asthma in his mother; COPD in his mother; Heart disease in his father.  Allergies:  Niaspan extended-release [niacin], Amoxicillin-pot clavulanate, and Nystatin   Social History/SUBSTANCE ABUSE HISTORY:  Personal history of substance abuse: No   reports that  "he quit smoking about 9 years ago. His smoking use included cigarettes. He has been exposed to tobacco smoke. He has never used smokeless tobacco. He reports that he does not currently use alcohol. He reports that he does not use drugs.  LABS:  CBC  Lab Results   Component Value Date    WBC 10.01 06/22/2024    HGB 16.6 06/22/2024    HCT 49.5 06/22/2024     Coagulation Profile   Lab Results   Component Value Date     06/22/2024     No results found for: "PT", "PTT", "INR"  CMP:  BMP  Lab Results   Component Value Date     06/22/2024    K 4.3 06/22/2024     06/22/2024    CO2 26 06/22/2024    BUN 19 06/22/2024    CREATININE 1.0 06/22/2024    CALCIUM 10.0 06/22/2024    ANIONGAP 11 06/22/2024    EGFRNORACEVR >60 06/22/2024     Lab Results   Component Value Date    ALT 29 06/22/2024    AST 25 06/22/2024    ALKPHOS 47 (L) 06/22/2024    BILITOT 0.5 06/22/2024     HGBA1C:  Lab Results   Component Value Date    HGBA1C 5.5 08/11/2023       ROS:    Review of Systems   GENERAL:  No weight loss, malaise or fevers.  HEENT:   No recent changes in vision or hearing  NECK:  Negative for lumps, no difficulty with swallowing.  RESPIRATORY:  Negative for cough, wheezing or shortness of breath, patient denies any recent URI.  CARDIOVASCULAR:  Negative for chest pain or palpitations.  GI:  Negative for abdominal discomfort, blood in stools or black stools or change in bowel habits.  MUSCULOSKELETAL:  See HPI.  SKIN:  Negative for lesions, rash, and itching.  PSYCH:  No mood disorder or recent psychosocial stressors.   HEMATOLOGY/LYMPHOLOGY:  See the blood thinner sectioned in HPI.  NEURO:  See HPI  All other reviewed and negative other than HPI.    PHYSICAL EXAM:  VITALS: Wt (!) 144.1 kg (317 lb 9.6 oz)   BMI 45.57 kg/m²   Body mass index is 45.57 kg/m².  GENERAL: Well appearing, in no acute distress, alert and oriented x3, answers questions appropriately.   PSYCH: Flat affect.  SKIN: Skin color, texture, turgor " normal, no rashes or lesions.  HEAD/FACE:  Normocephalic, atraumatic. Cranial nerves grossly intact.  CV: Regular rate  PULM: No evidence of respiratory difficulty, symmetric chest rise.  GI:  Soft and non-Distended.  BACK/SIJ/HIP:  Lumbar Spine Exam:       Inspection: No erythema, bruising.       Palpation: (+) TTP of lumbar paraspinals bilaterally      ROM:  Limited in flexion, extension, lateral bending.       (+) Facet loading bilaterally      (NA) Straight Leg Raise bilaterally      (NA) JIM bilaterally, Tenderness over the PSIS, Yeoman test  Hip Exam:      Inspection: No gross deformity or apparent leg length discrepancy      Palpation:  No TTP to bilateral greater trochanteric bursas.       ROM:  No limitation or pain in internal rotation, external rotation b/l  Neurologic Exam:     Alert. Speech is fluent and appropriate.     Strength: 3/5 in  left hip flexion and knee extension     Sensation:   decreased sensation to the left lower extremity      Tone: No abnormality appreciated in bilateral lower extremities  GAIT: Antalgic gait, unsteady gait, using cane    DIAGNOSTIC STUDIES AND MEDICAL RECORDS REVIEW:  I have personally reviewed and interpreted relevant radiology reports and reviewed relevant records from other services in the EMR.   MRI lumbar spine 2024      At T12-L1, no disc herniation, central canal stenosis or neural foraminal narrowing.     At L1-2, no disc herniation, central canal stenosis or neural foraminal narrowing.     At L2-3 circumferential disc bulge with facet arthropathy contributing to mild left neural foraminal narrowing.  No central canal stenosis.     At L3-4, circumferential disc bulge extending into both neural foramina with facet arthropathy contributing to mild central canal stenosis with mild left neural foraminal narrowing.     At L4-5, circumferential disc bulge with ligamentum flavum hypertrophy and facet arthropathy contributing to minimal central canal stenosis without  neural foraminal narrowing.     At L5-S1, circumferential disc bulge extending into both neural foramina with osseous spurring in the neural foramina and facet arthropathy contributing to moderate central canal stenosis with moderate bilateral neural foraminal narrowing, worse on the right.    MRI bilateral hip   No acetabular labral tear is visible on this non-arthrographic exam.     The bones exhibit normal marrow signal without evidence for fracture, stress fracture, or osseous contusion.     The right hip demonstrates moderate chondral thinning without high-grade chondral defect identified.  There is mild subchondral cystic change of the acetabular roof.    There are low-grade strains of the left gluteus medius and minimus tendons at their greater trochanteric insertions.     Impression:     Moderate degenerative change of the left hip.  Low-grade strains of the left gluteus medius and minimus tendons.    Clinical Impression:  This is a pleasant 68 y.o. male patient with PMH/PSH of COPD, hypertension, obesity, acid reflux, long-term anticoagulation on Eliquis due to AFib, presenting with left hip pain due to moderate degenerative changes of the left hip, he also reported decreased sensation over the left thigh possible related to the lateral femoral cutaneous nerve entrapment.     Encounter Diagnosis:  Shawn Hand is a 68 y.o. male with the following diagnoses based on history, exam, and imagin. Chronic left hip pain  - Ambulatory referral/consult to Pain Clinic    2. Lumbar facet arthropathy    3. Lumbar radiculitis   ---------------------------------------------------------    Treatment Plan:    Diagnostics/Referrals: None    Medications:    NSAIDs: None  Topical Agent: No  TCA/SSRI/SNRI: None  Anti-convulsants: None  Muscle Relaxants: None  Opioids: tramadol Per PCP    Interventional Therapy:  Left hip injection and Left GTB  Sedation: Oral Sedation.   Anticogualtion drugs: Eliquis-Clearance  to stop Blood thinner:NA     Regarding the above interventions, the patient has been educated regarding the risks (including bleeding, infection, increased pain, nerve damage, or allergic reaction), benefits, and alternatives. The patient states he understands and is eager to proceed.    Physical Rehabilitation: Physician led exercise program and home exercise    Patient Education: Counseled patient regarding the importance of activity modification, I have stressed the importance of physical activity and a home exercise plan to help with pain and improve health.    Follow-up: RTC 4 weeks to discuss Hip block, LESI.    May consider: Left LFCN block vs LESI, Hip ablation     I would like to thank Jaison Tesfaye MD for the opportunity to assist in the care of this patient. We had a very nice visit and I look forward to continuing their care. Please let me know if I can be of further assistance.     Stu Reed MD  Anesthesiologist  Interventional Pain Medicine  08/12/2024    Disclaimer:  This note was prepared using voice recognition system and is likely to have sound alike errors that may have been overlooked even after proof reading.  Please call me with any questions.

## 2024-08-12 NOTE — TELEPHONE ENCOUNTER
----- Message from Stu Reed MD sent at 8/12/2024  8:55 AM CDT -----  · Interventional Therapy:  Left hip injection and Left GTB this Friday   · Sedation: Oral Sedation.  ·  Anticogualtion drugs: Eliquis-Clearance to stop Blood thinner:NA

## 2024-08-14 NOTE — PROGRESS NOTES
August 19, 2024       Juan Reed MD  [address] [facility] [address]  Via In Basket      Patient: RASHIDA Castle   YOB: 2024   Date of Visit: 2024       Dear Dr. Reed:    Thank you for referring RASHIDA Castle to me for evaluation. Below are my notes for this visit with her.    If you have questions, please do not hesitate to call me. I look forward to following your patient along with you.      Sincerely,        Emily Morelos MD        CC: No Recipients    Emily Morelos MD  2024 11:25 AM  Signed  Patient ID: RASHIDA is a 6 month old female.    Chief Complaint   Patient presents with   • Consultation     Umbilical and inguinal hernia   • Office Visit     Rashida is a 6 month old female , 28w0d GA, here for evaluation for an umbilical and possible inguinal hernia.  The inguinal hernia was not documented on an imaging study.  It was possibly noted on a clinical exam one time while pt was in OSH NICU. Parents have not noted a bulge in either groin since being home.  Rashida has hx of BPD, currently on medications and 1/8th of L O2 at night.  She has been cleared by cardiology service for PDA with no contraindications to surgery.  She is growing well.  She has been stooling with aid of glycerin daily but has not needed that in past couple of days.        RASHIDA AYON does not have a problem list on file.  RASHIDA  has no past surgical history on file.  Her family history includes Allergic Rhinitis in her maternal grandfather and paternal grandfather; Asthma in her paternal grandmother.  RASHIDA AYON has a current medication list which includes the following prescription(s): fluticasone propionate, glycerin pediatric, potassium chloride, chlorothiazide (diuril), albuterol, spacer/aero-hold chamber mask, chlorothiazide (diuril), and fluticasone-salmeterol.  RASHIDA has No Known Allergies.    Review of Systems:  Review of Systems   Unable to perform ROS: Age  Subjective:       Patient ID: Shawn Hand is a 66 y.o. male.    Chief Complaint: Back Pain (Lower back pain radiating to the front lower abd, x2-3 days)    Pt is a 66 y.o. male who presents for evaluation and management of   Encounter Diagnosis   Name Primary?    Left flank pain Yes   .3 days   Started after cutting grass.   Radiates to his left testicle   Some nausea. No vomiting   Aggravated by riding on  and wearing 23# utility belt at work ().     Doing well on current meds. Denies any side effects. Prevention is up to date.      Review of Systems   Gastrointestinal: Positive for nausea.   Genitourinary: Positive for flank pain and testicular pain. Negative for difficulty urinating, dysuria and hematuria.       Objective:      Physical Exam  Constitutional:       Appearance: He is well-developed.   HENT:      Head: Normocephalic and atraumatic.      Right Ear: External ear normal.      Left Ear: External ear normal.      Nose: Nose normal.   Eyes:      General: No scleral icterus.        Right eye: No discharge.         Left eye: No discharge.      Conjunctiva/sclera: Conjunctivae normal.      Pupils: Pupils are equal, round, and reactive to light.   Neck:      Thyroid: No thyromegaly.      Vascular: No JVD.      Trachea: No tracheal deviation.   Cardiovascular:      Rate and Rhythm: Normal rate and regular rhythm.      Heart sounds: Normal heart sounds. No murmur heard.  Pulmonary:      Effort: Pulmonary effort is normal. No respiratory distress.      Breath sounds: Normal breath sounds. No wheezing or rales.   Chest:      Chest wall: No tenderness.   Abdominal:      General: Bowel sounds are normal. There is no distension.      Palpations: Abdomen is soft. There is no mass.      Tenderness: There is no abdominal tenderness. There is no guarding or rebound.      Comments: No inguinal hernia bilaterally   Genitourinary:     Comments: No inguinal hernia   No testicular TTP, no epididymal TTP        Physical:  Visit Vitals  Wt 5.935 kg (13 lb 1.4 oz)     Physical Exam  Constitutional:       Appearance: Normal appearance.   Cardiovascular:      Rate and Rhythm: Normal rate and regular rhythm.   Pulmonary:      Effort: Pulmonary effort is normal.      Breath sounds: Normal breath sounds.   Abdominal:      General: Abdomen is flat.      Palpations: Abdomen is soft.      Comments: +umbilical hernia   Genitourinary:     Comments: Prominent suprapubic fat pad, no palpable bilateral inguinal hernias noted.  Skin:     General: Skin is warm and dry.   Neurological:      Mental Status: She is alert.         GABO was seen today for consultation and office visit.    Diagnoses and all orders for this visit:    Bulge in groin area    Plan  I could not palpate an inguinal hernia today even with infant upset dduring exam. Father instructed regarding symptoms of an inguinal hernia and incarcerated inguinal hernia, all questions answered.  Follow up as needed.  Father will contact office if rectal biopsy needed as he continues follow up with Dr Wolf.    On 2024, IKary CNS scribed the services personally performed by Emily Morelos MD     The documentation recorded by the scribe accurately and completely reflects the service(s) I personally performed and the decisions made by me.              Musculoskeletal:         General: Normal range of motion.      Cervical back: Normal range of motion and neck supple.   Lymphadenopathy:      Cervical: No cervical adenopathy.   Skin:     General: Skin is warm and dry.   Neurological:      Mental Status: He is alert and oriented to person, place, and time.      Cranial Nerves: No cranial nerve deficit.      Motor: No abnormal muscle tone.      Coordination: Coordination normal.      Deep Tendon Reflexes: Reflexes are normal and symmetric. Reflexes normal.   Psychiatric:         Behavior: Behavior normal.         Thought Content: Thought content normal.         Judgment: Judgment normal.         Assessment:       1. Left flank pain        Plan:   Shawn HANNAH Sr was seen today for back pain.    Diagnoses and all orders for this visit:    Left flank pain  -     POCT urinalysis, dipstick or tablet reag; Future  -     POCT URINE SEDIMENT EXAM; Future  -     X-Ray Abdomen AP 1 View; Future  -     HYDROcodone-acetaminophen (NORCO) 7.5-325 mg per tablet; Take 1 tablet by mouth every 8 (eight) hours as needed for Pain.  -     triamcinolone acetonide injection 60 mg      Problem List Items Addressed This Visit     Left flank pain - Primary    Overview     Radiates to testicle   U/A negative as is KUB. Testicular u/s in past has been negative.   His hx is not classic for nephrolithiasis.   Will give him norco prn for severe pain   Consider CT            Relevant Medications    HYDROcodone-acetaminophen (NORCO) 7.5-325 mg per tablet    triamcinolone acetonide injection 60 mg (Start on 4/27/2022  3:00 PM)    Other Relevant Orders    POCT urinalysis, dipstick or tablet reag    POCT URINE SEDIMENT EXAM    X-Ray Abdomen AP 1 View (Completed)            No follow-ups on file.

## 2024-08-15 ENCOUNTER — OFFICE VISIT (OUTPATIENT)
Dept: FAMILY MEDICINE | Facility: CLINIC | Age: 69
End: 2024-08-15
Payer: COMMERCIAL

## 2024-08-15 VITALS
OXYGEN SATURATION: 96 % | BODY MASS INDEX: 45.1 KG/M2 | WEIGHT: 315 LBS | SYSTOLIC BLOOD PRESSURE: 136 MMHG | HEART RATE: 92 BPM | HEIGHT: 70 IN | RESPIRATION RATE: 18 BRPM | DIASTOLIC BLOOD PRESSURE: 72 MMHG

## 2024-08-15 DIAGNOSIS — M16.0 PRIMARY OSTEOARTHRITIS OF BOTH HIPS: ICD-10-CM

## 2024-08-15 DIAGNOSIS — M48.062 SPINAL STENOSIS OF LUMBAR REGION WITH NEUROGENIC CLAUDICATION: ICD-10-CM

## 2024-08-15 DIAGNOSIS — G47.01 INSOMNIA DUE TO MEDICAL CONDITION: Primary | ICD-10-CM

## 2024-08-15 PROCEDURE — 1125F AMNT PAIN NOTED PAIN PRSNT: CPT | Mod: CPTII,S$GLB,, | Performed by: FAMILY MEDICINE

## 2024-08-15 PROCEDURE — 1159F MED LIST DOCD IN RCRD: CPT | Mod: CPTII,S$GLB,, | Performed by: FAMILY MEDICINE

## 2024-08-15 PROCEDURE — 99999 PR PBB SHADOW E&M-EST. PATIENT-LVL IV: CPT | Mod: PBBFAC,,, | Performed by: FAMILY MEDICINE

## 2024-08-15 PROCEDURE — 3008F BODY MASS INDEX DOCD: CPT | Mod: CPTII,S$GLB,, | Performed by: FAMILY MEDICINE

## 2024-08-15 PROCEDURE — 3078F DIAST BP <80 MM HG: CPT | Mod: CPTII,S$GLB,, | Performed by: FAMILY MEDICINE

## 2024-08-15 PROCEDURE — 3288F FALL RISK ASSESSMENT DOCD: CPT | Mod: CPTII,S$GLB,, | Performed by: FAMILY MEDICINE

## 2024-08-15 PROCEDURE — 1160F RVW MEDS BY RX/DR IN RCRD: CPT | Mod: CPTII,S$GLB,, | Performed by: FAMILY MEDICINE

## 2024-08-15 PROCEDURE — 1101F PT FALLS ASSESS-DOCD LE1/YR: CPT | Mod: CPTII,S$GLB,, | Performed by: FAMILY MEDICINE

## 2024-08-15 PROCEDURE — 99213 OFFICE O/P EST LOW 20 MIN: CPT | Mod: S$GLB,,, | Performed by: FAMILY MEDICINE

## 2024-08-15 PROCEDURE — 3075F SYST BP GE 130 - 139MM HG: CPT | Mod: CPTII,S$GLB,, | Performed by: FAMILY MEDICINE

## 2024-08-15 NOTE — PROGRESS NOTES
Subjective:       Patient ID: Shawn Hand is a 68 y.o. male.    Chief Complaint: Consult (Pt here to have paperwork filled. )    Pt is a 68 y.o. male who presents for evaluation and management of   Encounter Diagnoses   Name Primary?    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  Ambulating with cane   Severe pain in his left hip and lower back   Unable to sit or stand for longer than 15 minutes     Doing well on current meds. Denies any side effects. Prevention is up to date.  Review of Systems   Musculoskeletal:  Positive for arthralgias and gait problem.       Objective:      Physical Exam  Constitutional:       Appearance: Normal appearance. He is well-developed. He is obese. He is not ill-appearing.   HENT:      Head: Normocephalic and atraumatic.      Right Ear: External ear normal.      Left Ear: External ear normal.      Nose: Nose normal.      Mouth/Throat:      Mouth: Mucous membranes are moist.      Pharynx: No oropharyngeal exudate or posterior oropharyngeal erythema.   Eyes:      General: No scleral icterus.        Right eye: No discharge.         Left eye: No discharge.      Conjunctiva/sclera: Conjunctivae normal.      Pupils: Pupils are equal, round, and reactive to light.   Neck:      Thyroid: No thyromegaly.      Vascular: No JVD.      Trachea: No tracheal deviation.   Cardiovascular:      Rate and Rhythm: Normal rate and regular rhythm.      Heart sounds: Normal heart sounds. No murmur heard.  Pulmonary:      Effort: Pulmonary effort is normal. No respiratory distress.      Breath sounds: Normal breath sounds. No wheezing or rales.   Chest:      Chest wall: No tenderness.   Abdominal:      General: Bowel sounds are normal. There is no distension.      Palpations: Abdomen is soft. There is no mass.      Tenderness: There is no abdominal tenderness. There is no guarding or rebound.    Musculoskeletal:         General: Normal range of motion.      Cervical back: Normal range of motion and neck supple.      Right lower leg: No edema.      Left lower leg: No edema.      Comments: Neg SLR   Anesthesia of left lateral thigh   Pain in left groin with int/ext rotation of hip    Lymphadenopathy:      Cervical: No cervical adenopathy.   Skin:     General: Skin is warm and dry.   Neurological:      Mental Status: He is alert and oriented to person, place, and time.      Cranial Nerves: No cranial nerve deficit.      Motor: No abnormal muscle tone.      Coordination: Coordination normal.      Deep Tendon Reflexes: Reflexes are normal and symmetric. Reflexes normal.   Psychiatric:         Behavior: Behavior normal.         Thought Content: Thought content normal.         Judgment: Judgment normal.         Assessment:       1. Insomnia due to medical condition    2. Spinal stenosis of lumbar region with neurogenic claudication    3. Primary osteoarthritis of both hips        Plan:   1. Insomnia due to medical condition    2. Spinal stenosis of lumbar region with neurogenic claudication    3. Primary osteoarthritis of both hips    He is unable to work in his current job until he gets proper pain relief. Pain mngt is working with patient. Will keep him out of work until his pain and mobility or improved     No follow-ups on file.

## 2024-08-16 ENCOUNTER — HOSPITAL ENCOUNTER (OUTPATIENT)
Facility: HOSPITAL | Age: 69
Discharge: HOME | End: 2024-08-16
Attending: ANESTHESIOLOGY | Admitting: ANESTHESIOLOGY
Payer: COMMERCIAL

## 2024-08-16 ENCOUNTER — TELEPHONE (OUTPATIENT)
Dept: PHARMACY | Facility: CLINIC | Age: 69
End: 2024-08-16
Payer: COMMERCIAL

## 2024-08-16 ENCOUNTER — HOSPITAL ENCOUNTER (OUTPATIENT)
Dept: RADIOLOGY | Facility: HOSPITAL | Age: 69
Discharge: HOME | End: 2024-08-16
Attending: ANESTHESIOLOGY | Admitting: ANESTHESIOLOGY
Payer: COMMERCIAL

## 2024-08-16 VITALS
DIASTOLIC BLOOD PRESSURE: 58 MMHG | RESPIRATION RATE: 15 BRPM | SYSTOLIC BLOOD PRESSURE: 111 MMHG | OXYGEN SATURATION: 93 % | HEART RATE: 71 BPM

## 2024-08-16 DIAGNOSIS — M25.552 CHRONIC LEFT HIP PAIN: Primary | ICD-10-CM

## 2024-08-16 DIAGNOSIS — G89.29 CHRONIC LEFT HIP PAIN: Primary | ICD-10-CM

## 2024-08-16 DIAGNOSIS — F41.9 ANXIETY: ICD-10-CM

## 2024-08-16 DIAGNOSIS — M25.559 HIP PAIN, UNSPECIFIED LATERALITY: ICD-10-CM

## 2024-08-16 DIAGNOSIS — M25.552 LEFT HIP PAIN: ICD-10-CM

## 2024-08-16 PROCEDURE — 25500020 PHARM REV CODE 255: Performed by: ANESTHESIOLOGY

## 2024-08-16 PROCEDURE — 63600175 PHARM REV CODE 636 W HCPCS: Mod: JZ,TB | Performed by: ANESTHESIOLOGY

## 2024-08-16 PROCEDURE — 25000003 PHARM REV CODE 250: Performed by: ANESTHESIOLOGY

## 2024-08-16 PROCEDURE — 77002 NEEDLE LOCALIZATION BY XRAY: CPT | Mod: 26,,, | Performed by: ANESTHESIOLOGY

## 2024-08-16 PROCEDURE — 20610 DRAIN/INJ JOINT/BURSA W/O US: CPT | Mod: LT,,, | Performed by: ANESTHESIOLOGY

## 2024-08-16 PROCEDURE — 76000 FLUOROSCOPY <1 HR PHYS/QHP: CPT | Mod: TC

## 2024-08-16 PROCEDURE — 20610 DRAIN/INJ JOINT/BURSA W/O US: CPT | Mod: LT | Performed by: ANESTHESIOLOGY

## 2024-08-16 RX ORDER — METHYLPREDNISOLONE ACETATE 40 MG/ML
INJECTION, SUSPENSION INTRA-ARTICULAR; INTRALESIONAL; INTRAMUSCULAR; SOFT TISSUE
Status: DISCONTINUED | OUTPATIENT
Start: 2024-08-16 | End: 2024-08-16 | Stop reason: HOSPADM

## 2024-08-16 RX ORDER — ALPRAZOLAM 0.25 MG/1
0.5 TABLET, ORALLY DISINTEGRATING ORAL
Status: DISCONTINUED | OUTPATIENT
Start: 2024-08-16 | End: 2024-08-16 | Stop reason: HOSPADM

## 2024-08-16 RX ORDER — BUPIVACAINE HYDROCHLORIDE 5 MG/ML
INJECTION, SOLUTION EPIDURAL; INTRACAUDAL
Status: DISCONTINUED | OUTPATIENT
Start: 2024-08-16 | End: 2024-08-16 | Stop reason: HOSPADM

## 2024-08-16 RX ORDER — LIDOCAINE HYDROCHLORIDE 20 MG/ML
INJECTION, SOLUTION INFILTRATION; PERINEURAL
Status: DISCONTINUED | OUTPATIENT
Start: 2024-08-16 | End: 2024-08-16 | Stop reason: HOSPADM

## 2024-08-16 RX ADMIN — ALPRAZOLAM 0.5 MG: 0.25 TABLET, ORALLY DISINTEGRATING ORAL at 09:08

## 2024-08-16 NOTE — DISCHARGE SUMMARY
Discharge Note  Short Stay    Admit Date: 8/16/2024    Attending Physician: Stu Reed    Discharge Physician: Stu Reed    Discharge Date: 8/16/2024 10:35 AM    Procedure(s) (LRB):  HIP AND GREATER TROCHANTERIC BURSA INJECTION (ELIQUIS) ORAL SEDATION (Left)    Final Diagnosis: Left hip pain [M25.552]    Disposition: Home or self care    Patient Instructions:   Current Discharge Medication List        CONTINUE these medications which have NOT CHANGED    Details   allopurinoL (ZYLOPRIM) 100 MG tablet Take 1 tablet (100 mg total) by mouth once daily.  Qty: 90 tablet, Refills: 0    Associated Diagnoses: Gout, arthritis      b complex vitamins capsule Take 1 capsule by mouth once daily.       !! budesonide-formoterol 160-4.5 mcg (SYMBICORT) 160-4.5 mcg/actuation HFAA Inhale 2 puffs into the lungs every 12 (twelve) hours. Controller  Qty: 10.2 g, Refills: 5    Associated Diagnoses: Pulmonary emphysema, unspecified emphysema type      cetirizine (ZYRTEC) 10 MG tablet Take 1 tablet by mouth Daily. 1 Tablet Oral Every day      colchicine (COLCRYS) 0.6 mg tablet TAKE 1 TABLET BY MOUTH EVERY DAY AS NEEDED FOR GOUT PAIN  Qty: 30 tablet, Refills: 5    Associated Diagnoses: Gout, arthritis      diltiaZEM (TIAZAC) 360 MG Cs24 Take 360 mg by mouth.      ELIQUIS 5 mg Tab Take 5 mg by mouth 2 (two) times daily.      esomeprazole (NEXIUM) 40 MG capsule Take 1 capsule (40 mg total) by mouth before breakfast.  Qty: 90 capsule, Refills: 3      fish oil-dha-epa 1,200-144-216 mg Cap Take 1 tablet by mouth Daily. 2 Capsule Oral Every day      mometasone-formoterol (DULERA) 50-5 mcg/actuation HFAA Inhale 2 puffs into the lungs 2 (two) times a day.  Qty: 13 g, Refills: 5      montelukast (SINGULAIR) 10 mg tablet Take 1 tablet (10 mg total) by mouth every evening.  Qty: 90 tablet, Refills: 3    Associated Diagnoses: Seasonal allergic rhinitis due to pollen      multivitamin (THERAGRAN) per tablet Take 2 tablets by mouth Daily. 1 Tablet  Oral Every day      olmesartan-hydrochlorothiazide (BENICAR HCT) 40-25 mg per tablet Take 1 tablet by mouth Daily. 1 Tablet Oral Every day      pravastatin (PRAVACHOL) 40 MG tablet Take 1 tablet by mouth Daily. 1 Tablet Oral Every day      albuterol (PROVENTIL) 2.5 mg /3 mL (0.083 %) nebulizer solution Take 3 mLs (2.5 mg total) by nebulization every 6 (six) hours as needed for Wheezing. Rescue  Qty: 90 mL, Refills: 11      albuterol (VENTOLIN HFA) 90 mcg/actuation inhaler Inhale 2 puffs into the lungs every 6 (six) hours as needed for Wheezing. Rescue  Qty: 18 g, Refills: 5    Associated Diagnoses: Pulmonary emphysema, unspecified emphysema type      sildenafil (VIAGRA) 100 MG tablet PLEASE SEE ATTACHED FOR DETAILED DIRECTIONS  Refills: 3      !! SYMBICORT 160-4.5 mcg/actuation HFAA Inhale 2 puffs into the lungs every 12 (twelve) hours. Controller  Qty: 10.2 g, Refills: 11    Associated Diagnoses: Chronic obstructive pulmonary disease with acute exacerbation      traMADoL (ULTRAM) 50 mg tablet Take 1 tablet (50 mg total) by mouth nightly as needed for Pain.  Qty: 30 each, Refills: 0    Comments: Quantity prescribed more than 7 day supply? Yes, quantity medically necessary  Associated Diagnoses: Spinal stenosis of lumbar region with neurogenic claudication; Osteoarthritis of spine with radiculopathy, cervical region       !! - Potential duplicate medications found. Please discuss with provider.          Discharge Diagnosis: Left hip pain [M25.552]  Condition on Discharge: Stable with no complications to procedure   Diet on Discharge: Same as before.  Activity: as per instruction sheet.  Discharge to: Home with a responsible adult.  Follow up: 2-4 weeks       Please call my office or pager at [phone] if experienced any weakness or loss of sensation, fever > 101.5, pain uncontrolled with oral medications, persistent nausea/vomiting/or diarrhea, redness or drainage from the incisions, or any other worrisome  concerns. If physician on call was not reached or could not communicate with our office for any reason please go to the nearest emergency department.     Stu Reed  08/16/2024

## 2024-08-16 NOTE — TELEPHONE ENCOUNTER
[facility]/Population Health Chart Review & Patient Outreach Details For Medication Adherence Project    Reason for Outreach Encounter: 3rd Party payor non-compliance report ([employer], [employer], [facility], etc)  2.  Patient Outreach Method: SaveMeetinghart message  3.   Medication in question: pravastatin 40 mg    LAST FILLED: 1/15/24 for 90 day supply  Hyperlipidemia Medications               fish oil-dha-epa 1,200-144-216 mg Cap Take 1 tablet by mouth Daily. 2 Capsule Oral Every day    pravastatin (PRAVACHOL) 40 MG tablet Take 1 tablet by mouth Daily. 1 Tablet Oral Every day               4.  Reviewed and or Updates Made To: Patient Chart  5. Outreach Outcomes and/or actions taken: Sent inquiry to patient: Waiting for response.

## 2024-08-16 NOTE — DISCHARGE INSTRUCTIONS
DIET: You may resume your normal diet today.    BATHING: You may resume your normal bathing.          You may shower, no hot water directly on site for 24 hours.    DRESSING: You may remove your bandage today.    ACTIVITY LEVEL: You may resume your normal activities 24 hours after your  procedure.    If you have received sedation or an anesthetic, you may feel sleepy for several hours. Rest until you are more awake. Gradually resume your normal activities tomorrow.    If you have received sedation or an anesthetic, do not drive or operate heavy machinery for at least 24 hours.    MEDICATION: You may resume your normal medications today.    You will receive instructions for any pain prescriptions. Pain medications should be taken only as directed.    SPECIAL INSTRUCTIONS: No heat to the injection site for 24 hours including: bath or shower, heating pad, moist heat, hot tubs.    Use ice pack to injection site for any pain or discomfort. Apply ice pack to 20 minutes then remove for 20 minutes before re-applying to site.    WHEN TO CALL DOCTOR: Redness or swelling around injection site    Fever of 101F    Drainage (pus) from the injection site    For any continuous bleeding (some dried blood over the incision is normal).    FOLLOW UP: Follow up phone call will be made by office.    FOR EMERGENCIES: If any unusual problems or difficulties occur during clinic hours, call [phone] or [phone]. DIET: You may resume your normal diet today.    BATHING: You may resume your normal bathing.          You may shower, no hot water directly on site for 24 hours.    DRESSING: You may remove your bandage today.    ACTIVITY LEVEL: You may resume your normal activities 24 hours after your  procedure.    If you have received sedation or an anesthetic, you may feel sleepy for several hours. Rest until you are more awake. Gradually resume your normal activities tomorrow.    If you have received sedation or an anesthetic, do not drive or  operate heavy machinery for at least 24 hours.    MEDICATION: You may resume your normal medications today.    You will receive instructions for any pain prescriptions. Pain medications should be taken only as directed.    SPECIAL INSTRUCTIONS: No heat to the injection site for 24 hours including: bath or shower, heating pad, moist heat, hot tubs.    Use ice pack to injection site for any pain or discomfort. Apply ice pack to 20 minutes then remove for 20 minutes before re-applying to site.    WHEN TO CALL DOCTOR: Redness or swelling around injection site    Fever of 101F    Drainage (pus) from the injection site    For any continuous bleeding (some dried blood over the incision is normal).    FOLLOW UP: Follow up phone call will be made by office.    FOR EMERGENCIES: If any unusual problems or difficulties occur during clinic hours, call [phone] or [phone].

## 2024-08-16 NOTE — OP NOTE
Hip Joint Injection under Fluoroscopic Guidance and GTB injection    The procedure, risks, benefits, and options were discussed with the patient. There are no contraindications to the procedure. The patent expressed understanding and agreed to the procedure. Informed written consent was obtained prior to the start of the procedure and can be found in the patient's chart.    PATIENT NAME: Shawn Hand   MRN: [id number]     DATE OF PROCEDURE: 08/16/2024    PROCEDURE: Left Hip Joint Injection under Fluoroscopic Guidance and GTB Injection    PRE-OP DIAGNOSIS: Left hip pain [M25.552]    POST-OP DIAGNOSIS: Left hip pain [M25.552]    PHYSICIAN: Stu Reed MD  MEDICATIONS INJECTED: Preservative-free Kenalog 40mg with 3cc of Bupivacine 0.25%     LOCAL ANESTHETIC INJECTED: Xylocaine 2%     SEDATION: None    ESTIMATED BLOOD LOSS: None    COMPLICATIONS: None    TECHNIQUE: Time-out was performed to identify the patient and procedure to be performed. With the patient laying in a supine position, the surgical area was prepped and draped in the usual sterile fashion using ChloraPrep and a fenestrated drape. The area overlying the hip joint was determined under fluoroscopy guidance. Skin anesthesia was achieved by injecting Lidocaine 2% over the injection site. A 22 gauge, 5 inch spinal quinke needle was introduced under fluoroscopy until the tip reached the greater trochanter. The tip of the needle was hinged cephalad from the greater trochanter into the joint space.  When the needle tip was in the appropriate position, and there was no blood aspiration, Contrast dye  Omnipaque (300mg/mL) was injected to confirm placement and there was no vascular runoff. 3 mL of the medication mixture listed above was injected slowly. The needles were removed and bleeding was nil. A sterile dressing was applied. No specimens collected. The patient tolerated the procedure well.      The patient was monitored after the procedure in the  recovery area. They were given post-procedure and discharge instructions to follow at home. The patient was discharged in a stable condition.    Stu Reed MD

## 2024-09-05 ENCOUNTER — TELEPHONE (OUTPATIENT)
Dept: PAIN MEDICINE | Facility: CLINIC | Age: 69
End: 2024-09-05
Payer: COMMERCIAL

## 2024-09-05 ENCOUNTER — OFFICE VISIT (OUTPATIENT)
Dept: PAIN MEDICINE | Facility: CLINIC | Age: 69
End: 2024-09-05
Payer: COMMERCIAL

## 2024-09-05 VITALS — WEIGHT: 315 LBS | BODY MASS INDEX: 46.25 KG/M2

## 2024-09-05 DIAGNOSIS — M47.816 LUMBAR FACET ARTHROPATHY: ICD-10-CM

## 2024-09-05 DIAGNOSIS — M54.16 LUMBAR RADICULITIS: Primary | ICD-10-CM

## 2024-09-05 DIAGNOSIS — G89.29 CHRONIC LEFT HIP PAIN: ICD-10-CM

## 2024-09-05 DIAGNOSIS — M25.552 LEFT HIP PAIN: ICD-10-CM

## 2024-09-05 DIAGNOSIS — M25.552 CHRONIC LEFT HIP PAIN: ICD-10-CM

## 2024-09-05 PROCEDURE — 99999 PR PBB SHADOW E&M-EST. PATIENT-LVL III: CPT | Mod: PBBFAC,,, | Performed by: ANESTHESIOLOGY

## 2024-09-05 NOTE — PROGRESS NOTES
New patient evaluation  [facility]    Shawn Hand  : 1955  Date: 2024     CHIEF COMPLAINT:  No chief complaint on file.    Referring Physician: No ref. provider found  Primary Care Physician: Jaison Tesfaye MD    HPI:  This is a 68 y.o. male with a chief complaint of No chief complaint on file.  . The patient has Past medical history/Past surgical history of  COPD, hypertension, obesity, acid reflux, long-term anticoagulation on Eliquis due to AFib    Patient was evaluated and referred by  primary care provider for low back pain,  left hip pain and left thigh pain.   MRI lumbar spine completed in  showed multilevel degenerative change of the lumbar spine, multilevel facet arthropathy,  most significant at L5-S1 with moderate central canal stenosis and moderate bilateral neural foramina narrowing.  MRI bilateral hip showed moderate degenerative change of the left hip.    Interval History 2024:  Shawn Hand is here for  postprocedure follow up visit after left hip injection and left GTB injection under fluoroscopic guidance, patient reported*** % improvement in pain and functionality      Current pain score: ***/10      Diabetic: No    Anticogualtion drugs: Eliquis    Allergy To Iodine: No    Currently on Antibiotic: No    Current Description of Pain Symptoms:    History of Recent Fall or Trauma: No   Onset: Chronic, started a few years but unable vinnie walk without cane starting on 2024  Pain Location: lower back  Radiates/associated symptoms: radiates down the left thigh,  in addition to decreased sensation to the anterior aspect of the left thigh, tenderness over the left GTB   Pain is Getting worse over the last 2 months    The pain is described as aching, burning, numbing, stabbing, and tingling.   Exacerbating factors:  sitting for a long period of time standing for a long period of time  Mitigating factors Nothing specific.    Symptoms interfere with daily activity, sleeping, and work([facility] [employer] office, back to light duty).   The patient feels like symptoms have been worsening.   Patient denies night fever/night sweats, urinary incontinence, bowel incontinence, significant weight loss, significant motor weakness, and loss of sensations.    Pain score:   Best: 4/10  Worst: 8/10    Current pain medications:      traMADoL (ULTRAM) 50 mg tablet, PRN    Current Narcotics/Opioid /benzo Medications:  Opioids- Tramadol (Ultram)  Benzodiazepines: No    UDS:  NA    PDMP:  Reviewed and consistent with medication use as prescribed.     Previous Chronic Pain Treatment History:  Physical Therapy/HEP/Physician Lead Exercise Program:  Over the past 12 months, Patient has done 0 sessions.  PT response: NA  Dates of the PT sessions: NA  Is patient participating in home exercise program (HEP)/ physician led exercise program: No.    Non-interventional Pain Therapy:  [x]Chiropractor: 2024, 15 sessions in July and August 2024  []Acupuncture/Dry needle.  [x]TENS unit.  []Heat/ICE.  []Back Brace.    Medications previously tried:  NSAIDs: None  Topical Agent: Yes  TCA/SSRI/SNRI: None  Anti-convulsants: Gabapentin  due to SE and weight gain.  Muscle Relaxants: None  Opioids- Hydrocodone with Acetaminophen (Norco) and Tramadol (Ultram).    Interventional Pain Procedures:  Left Hip GTB Injection 07/2024- mild relief  08/2024: left hip injection and left GTB injection under fluoroscopic guidance-  Previous spine/Relevant joint surgery:  Bilateral rotator cuff surgery  Surgical History:   has a past surgical history that includes Rotator cuff repair (Left); Cholecystectomy; Tonsillectomy; Coronary angioplasty with stent; Colonoscopy; Sinus surgery; Cardioversion; Rotator cuff repair (Right, 06/27/2017); and Injection of joint (Left, 8/16/2024).  Medical History:   has a past medical history of Atrial fibrillation, COPD (chronic obstructive pulmonary disease), Coronary  "artery disease, GERD (gastroesophageal reflux disease), Hyperlipidemia, Hypertension, and Sleep apnea.  Family History:  family history includes Asthma in his mother; COPD in his mother; Heart disease in his father.  Allergies:  Niaspan extended-release [niacin], Amoxicillin-pot clavulanate, and Nystatin   Social History/SUBSTANCE ABUSE HISTORY:  Personal history of substance abuse: No   reports that he quit smoking about 9 years ago. His smoking use included cigarettes. He has been exposed to tobacco smoke. He has never used smokeless tobacco. He reports that he does not currently use alcohol. He reports that he does not use drugs.  LABS:  CBC  Lab Results   Component Value Date    WBC 10.01 06/22/2024    HGB 16.6 06/22/2024    HCT 49.5 06/22/2024     Coagulation Profile   Lab Results   Component Value Date     06/22/2024     No results found for: "PT", "PTT", "INR"  CMP:  BMP  Lab Results   Component Value Date     06/22/2024    K 4.3 06/22/2024     06/22/2024    CO2 26 06/22/2024    BUN 19 06/22/2024    CREATININE 1.0 06/22/2024    CALCIUM 10.0 06/22/2024    ANIONGAP 11 06/22/2024    EGFRNORACEVR >60 06/22/2024     Lab Results   Component Value Date    ALT 29 06/22/2024    AST 25 06/22/2024    ALKPHOS 47 (L) 06/22/2024    BILITOT 0.5 06/22/2024     HGBA1C:  Lab Results   Component Value Date    HGBA1C 5.5 08/11/2023       ROS:    Review of Systems   GENERAL:  No weight loss, malaise or fevers.  HEENT:   No recent changes in vision or hearing  NECK:  Negative for lumps, no difficulty with swallowing.  RESPIRATORY:  Negative for cough, wheezing or shortness of breath, patient denies any recent URI.  CARDIOVASCULAR:  Negative for chest pain or palpitations.  GI:  Negative for abdominal discomfort, blood in stools or black stools or change in bowel habits.  MUSCULOSKELETAL:  See HPI.  SKIN:  Negative for lesions, rash, and itching.  PSYCH:  No mood disorder or recent psychosocial stressors. "   HEMATOLOGY/LYMPHOLOGY:  See the blood thinner sectioned in HPI.  NEURO:  See HPI  All other reviewed and negative other than HPI.    PHYSICAL EXAM:  VITALS: There were no vitals taken for this visit.  There is no height or weight on file to calculate BMI.  GENERAL: Well appearing, in no acute distress, alert and oriented x3, answers questions appropriately.   PSYCH: Flat affect.  SKIN: Skin color, texture, turgor normal, no rashes or lesions.  HEAD/FACE:  Normocephalic, atraumatic. Cranial nerves grossly intact.  CV: Regular rate  PULM: No evidence of respiratory difficulty, symmetric chest rise.  GI:  Soft and non-Distended.  BACK/SIJ/HIP:  Lumbar Spine Exam:       Inspection: No erythema, bruising.       Palpation: (+) TTP of lumbar paraspinals bilaterally      ROM:  Limited in flexion, extension, lateral bending.       (+) Facet loading bilaterally      (NA) Straight Leg Raise bilaterally      (NA) JIM bilaterally, Tenderness over the PSIS, Yeoman test  Hip Exam:      Inspection: No gross deformity or apparent leg length discrepancy      Palpation:  No TTP to bilateral greater trochanteric bursas.       ROM:  No limitation or pain in internal rotation, external rotation b/l  Neurologic Exam:     Alert. Speech is fluent and appropriate.     Strength: 3/5 in  left hip flexion and knee extension     Sensation:   decreased sensation to the left lower extremity      Tone: No abnormality appreciated in bilateral lower extremities  GAIT: Antalgic gait, unsteady gait, using cane    DIAGNOSTIC STUDIES AND MEDICAL RECORDS REVIEW:  I have personally reviewed and interpreted relevant radiology reports and reviewed relevant records from other services in the EMR.   MRI lumbar spine 2024    At T12-L1, no disc herniation, central canal stenosis or neural foraminal narrowing.     At L1-2, no disc herniation, central canal stenosis or neural foraminal narrowing.     At L2-3 circumferential disc bulge with facet arthropathy  contributing to mild left neural foraminal narrowing.  No central canal stenosis.     At L3-4, circumferential disc bulge extending into both neural foramina with facet arthropathy contributing to mild central canal stenosis with mild left neural foraminal narrowing.     At L4-5, circumferential disc bulge with ligamentum flavum hypertrophy and facet arthropathy contributing to minimal central canal stenosis without neural foraminal narrowing.     At L5-S1, circumferential disc bulge extending into both neural foramina with osseous spurring in the neural foramina and facet arthropathy contributing to moderate central canal stenosis with moderate bilateral neural foraminal narrowing, worse on the right.    MRI bilateral hip   No acetabular labral tear is visible on this non-arthrographic exam.     The bones exhibit normal marrow signal without evidence for fracture, stress fracture, or osseous contusion.     The right hip demonstrates moderate chondral thinning without high-grade chondral defect identified.  There is mild subchondral cystic change of the acetabular roof.    There are low-grade strains of the left gluteus medius and minimus tendons at their greater trochanteric insertions.     Impression:     Moderate degenerative change of the left hip.  Low-grade strains of the left gluteus medius and minimus tendons.    Clinical Impression:  This is a pleasant 68 y.o. male patient with PMH/PSH of COPD, hypertension, obesity, acid reflux, long-term anticoagulation on Eliquis due to AFib, presenting with left hip pain due to moderate degenerative changes of the left hip, he also reported decreased sensation over the left thigh possible related to the lateral femoral cutaneous nerve entrapment.     Encounter Diagnosis:  Shawn Hand is a 68 y.o. male with the following diagnoses based on history, exam, and imagin. Left hip pain    2. Chronic left hip pain    3. Lumbar facet arthropathy      ---------------------------------------------------------    Treatment Plan:    Diagnostics/Referrals: None    Medications:    NSAIDs: None  Topical Agent: No  TCA/SSRI/SNRI: None  Anti-convulsants: None  Muscle Relaxants: None  Opioids: tramadol Per PCP    Interventional Therapy:  Left hip injection and Left GTB  Sedation: Oral Sedation.   Anticogualtion drugs: Eliquis-Clearance to stop Blood thinner:NA     Regarding the above interventions, the patient has been educated regarding the risks (including bleeding, infection, increased pain, nerve damage, or allergic reaction), benefits, and alternatives. The patient states he understands and is eager to proceed.    Physical Rehabilitation: Physician led exercise program and home exercise    Patient Education: Counseled patient regarding the importance of activity modification, I have stressed the importance of physical activity and a home exercise plan to help with pain and improve health.    Follow-up: RTC 4 weeks to discuss Hip block, LESI.    May consider: Left LFCN block vs LESI, Hip ablation      Stu Reed MD  Anesthesiologist  Interventional Pain Medicine  09/05/2024    Disclaimer:  This note was prepared using voice recognition system and is likely to have sound alike errors that may have been overlooked even after proof reading.  Please call me with any questions.

## 2024-09-05 NOTE — H&P (VIEW-ONLY)
EST patient evaluation  [facility]    Shawn Hand  : 1955  Date: 2024     CHIEF COMPLAINT:  Follow-up (Proc )    Referring Physician: No ref. provider found  Primary Care Physician: Jaison Tesfaye MD    HPI:  This is a 68 y.o. male with a chief complaint of Follow-up (Proc )  . The patient has Past medical history/Past surgical history of  COPD, hypertension, obesity, acid reflux, long-term anticoagulation on Eliquis due to AFib    Patient was evaluated and referred by  primary care provider for low back pain,  left hip pain and left thigh pain.   MRI lumbar spine completed in  showed multilevel degenerative change of the lumbar spine, multilevel facet arthropathy,  most significant at L5-S1 with moderate central canal stenosis and moderate bilateral neural foramina narrowing.  MRI bilateral hip showed moderate degenerative change of the left hip.    Interval History 2024:  Shawn Hand is here for  postprocedure follow up visit after left hip injection and left GTB injection under fluoroscopic guidance, patient reported 50% improvement in pain and functionality lasting 3-4 days.Current pain score: 4/10      Diabetic: No    Anticogualtion drugs: Eliquis    Allergy To Iodine: No    Currently on Antibiotic: No    Current Description of Pain Symptoms:    History of Recent Fall or Trauma: No   Onset: Chronic, started a few years but unable to walk without cane starting on 2024  Pain Location: lower back  Radiates/associated symptoms: radiates down the left thigh,  in addition to decreased sensation to the anterior aspect of the left thigh, tenderness over the left GTB,  he describes his left lower extremity symptoms as more uncomfortable feeling and  skin crawling,  that comes and goes completely unpredictable,  associated with pulling feeling of the medial aspect of the left thigh  Pain is Getting worse over the last 2 months    The pain  is described as aching, burning, numbing, stabbing, and tingling.   Exacerbating factors:  sitting for a long period of time -standing for a long period of time  Mitigating factors Nothing specific.   Symptoms interfere with daily activity, sleeping, and work( office, back to light duty).   The patient feels like symptoms have been worsening.   Patient denies night fever/night sweats, urinary incontinence, bowel incontinence, significant weight loss, significant motor weakness, and loss of sensations.    Pain score:   Best: 4/10  Worst: 8/10    Current pain medications:   Over-the-counter Tylenol  Current Narcotics/Opioid /benzo Medications:  Opioids- No  Benzodiazepines: No    UDS:  NA    PDMP:  Reviewed and consistent with medication use as prescribed.     Previous Chronic Pain Treatment History:  Physical Therapy/HEP/Physician Lead Exercise Program:  Over the past 12 months, Patient has done 0 sessions.  PT response: NA  Dates of the PT sessions: NA  Is patient participating in home exercise program (HEP)/ physician led exercise program: No.    Non-interventional Pain Therapy:  [x]Chiropractor: 2024, 15 sessions in July and August 2024  []Acupuncture/Dry needle.  [x]TENS unit.  []Heat/ICE.  []Back Brace.    Medications previously tried:  NSAIDs: None  Topical Agent: Yes  TCA/SSRI/SNRI: None  Anti-convulsants: Gabapentin  due to SE and weight gain.  Muscle Relaxants: None  Opioids- Hydrocodone with Acetaminophen (Norco) and Tramadol (Ultram).    Interventional Pain Procedures:  Left Hip GTB Injection 07/2024- mild relief  08/2024: left hip injection and left GTB injection under fluoroscopic guidance- 50% relief for  few days  Previous spine/Relevant joint surgery:  Bilateral rotator cuff surgery  Surgical History:   has a past surgical history that includes Rotator cuff repair (Left); Cholecystectomy; Tonsillectomy; Coronary angioplasty with stent; Colonoscopy; Sinus surgery; Cardioversion; Rotator cuff  "repair (Right, 06/27/2017); and Injection of joint (Left, 8/16/2024).  Medical History:   has a past medical history of Atrial fibrillation, COPD (chronic obstructive pulmonary disease), Coronary artery disease, GERD (gastroesophageal reflux disease), Hyperlipidemia, Hypertension, and Sleep apnea.  Family History:  family history includes Asthma in his mother; COPD in his mother; Heart disease in his father.  Allergies:  Niaspan extended-release [niacin], Amoxicillin-pot clavulanate, and Nystatin   Social History/SUBSTANCE ABUSE HISTORY:  Personal history of substance abuse: No   reports that he quit smoking about 9 years ago. His smoking use included cigarettes. He has been exposed to tobacco smoke. He has never used smokeless tobacco. He reports that he does not currently use alcohol. He reports that he does not use drugs.  LABS:  CBC  Lab Results   Component Value Date    WBC 10.01 06/22/2024    HGB 16.6 06/22/2024    HCT 49.5 06/22/2024     Coagulation Profile   Lab Results   Component Value Date     06/22/2024     No results found for: "PT", "PTT", "INR"  CMP:  BMP  Lab Results   Component Value Date     06/22/2024    K 4.3 06/22/2024     06/22/2024    CO2 26 06/22/2024    BUN 19 06/22/2024    CREATININE 1.0 06/22/2024    CALCIUM 10.0 06/22/2024    ANIONGAP 11 06/22/2024    EGFRNORACEVR >60 06/22/2024     Lab Results   Component Value Date    ALT 29 06/22/2024    AST 25 06/22/2024    ALKPHOS 47 (L) 06/22/2024    BILITOT 0.5 06/22/2024     HGBA1C:  Lab Results   Component Value Date    HGBA1C 5.5 08/11/2023       ROS:    Review of Systems   GENERAL:  No weight loss, malaise or fevers.  HEENT:   No recent changes in vision or hearing  NECK:  Negative for lumps, no difficulty with swallowing.  RESPIRATORY:  Negative for cough, wheezing or shortness of breath, patient denies any recent URI.  CARDIOVASCULAR:  Negative for chest pain or palpitations.  GI:  Negative for abdominal discomfort, blood " in stools or black stools or change in bowel habits.  MUSCULOSKELETAL:  See HPI.  SKIN:  Negative for lesions, rash, and itching.  PSYCH:  No mood disorder or recent psychosocial stressors.   HEMATOLOGY/LYMPHOLOGY:  See the blood thinner sectioned in HPI.  NEURO:  See HPI  All other reviewed and negative other than HPI.    PHYSICAL EXAM:  VITALS: Wt (!) 146.2 kg (322 lb 4.8 oz)   BMI 46.25 kg/m²   Body mass index is 46.25 kg/m².  GENERAL: Well appearing, in no acute distress, alert and oriented x3, answers questions appropriately.   PSYCH: Flat affect.  SKIN: Skin color, texture, turgor normal, no rashes or lesions.  HEAD/FACE:  Normocephalic, atraumatic. Cranial nerves grossly intact.  CV: Regular rate  PULM: No evidence of respiratory difficulty, symmetric chest rise.  GI:  Soft and non-Distended.  BACK/SIJ/HIP:  Lumbar Spine Exam:       Inspection: No erythema, bruising.       Palpation: (+) TTP of lumbar paraspinals bilaterally      ROM:  Limited in flexion, extension, lateral bending.       (+) Facet loading bilaterally      (NA) Straight Leg Raise bilaterally      (NA) JIM bilaterally, Tenderness over the PSIS, Yeoman test  Hip Exam:      Inspection: No gross deformity or apparent leg length discrepancy      Palpation:  No TTP to bilateral greater trochanteric bursas.       ROM:  No limitation or pain in internal rotation, external rotation b/l  Neurologic Exam:     Alert. Speech is fluent and appropriate.     Strength: 3/5 in  left hip flexion and knee extension     Sensation:   decreased sensation to the left lower extremity      Tone: No abnormality appreciated in bilateral lower extremities  GAIT: Antalgic gait, unsteady gait, using cane    DIAGNOSTIC STUDIES AND MEDICAL RECORDS REVIEW:  I have personally reviewed and interpreted relevant radiology reports and reviewed relevant records from other services in the EMR.   MRI lumbar spine 2024    At T12-L1, no disc herniation, central canal stenosis or  neural foraminal narrowing.     At L1-2, no disc herniation, central canal stenosis or neural foraminal narrowing.     At L2-3 circumferential disc bulge with facet arthropathy contributing to mild left neural foraminal narrowing.  No central canal stenosis.     At L3-4, circumferential disc bulge extending into both neural foramina with facet arthropathy contributing to mild central canal stenosis with mild left neural foraminal narrowing.     At L4-5, circumferential disc bulge with ligamentum flavum hypertrophy and facet arthropathy contributing to minimal central canal stenosis without neural foraminal narrowing.     At L5-S1, circumferential disc bulge extending into both neural foramina with osseous spurring in the neural foramina and facet arthropathy contributing to moderate central canal stenosis with moderate bilateral neural foraminal narrowing, worse on the right.    MRI bilateral hip 2024  No acetabular labral tear is visible on this non-arthrographic exam.     The bones exhibit normal marrow signal without evidence for fracture, stress fracture, or osseous contusion.     The right hip demonstrates moderate chondral thinning without high-grade chondral defect identified.  There is mild subchondral cystic change of the acetabular roof.    There are low-grade strains of the left gluteus medius and minimus tendons at their greater trochanteric insertions.     Impression:     Moderate degenerative change of the left hip.  Low-grade strains of the left gluteus medius and minimus tendons.    Clinical Impression:  This is a pleasant 68 y.o. male patient with PMH/PSH of COPD, hypertension, obesity, acid reflux, long-term anticoagulation on Eliquis due to AFib, presenting with  axial low back pain in addition to uncomfortable /skin crawling feeling of the left thigh in addition to pulling sensation in medial aspect of the left thigh, he had limited relief from left hip injection which rule out  hip pathology,  his MRI lumbar spine showed disc herniation at L4-L5 with bilateral neural foramina narrowing, worse in the right side,  I will address his low back pain 1st by providing him with a lumbar epidural steroid injection, if he has no relief can consider nerve conduction study in the left lower extremities in addition to lumbar medial branch block  Encounter Diagnosis:  Shawn Hand is a 68 y.o. male with the following diagnoses based on history, exam, and imagin. Lumbar radiculitis    2. Left hip pain    3. Chronic left hip pain    4. Lumbar facet arthropathy  --------------------------------------------------------    Treatment Plan:    Diagnostics/Referrals: continue with a home exercise    Medications:    NSAIDs: None  Topical Agent: No  TCA/SSRI/SNRI: None  Anti-convulsants: None  Muscle Relaxants: None  Opioids:  none    Interventional Therapy:  please schedule for lumbar epidural steroid injection at L5-S1  Sedation: Oral Sedation.   Anticogualtion drugs: Eliquis-Clearance to stop Blood thinner: 3 days    Regarding the above interventions, the patient has been educated regarding the risks (including bleeding, infection, increased pain, nerve damage, or allergic reaction), benefits, and alternatives. The patient states he understands and is eager to proceed.    Physical Rehabilitation: Physician led exercise program and home exercise    Patient Education: Counseled patient regarding the importance of activity modification, I have stressed the importance of physical activity and a home exercise plan to help with pain and improve health.    Follow-up: RTC 4 weeks    May consider:  EMG/nerve conduction study Left lower extremity, lumbar medial branch block, neurology referral, may consider Giuseppe Reed MD  Anesthesiologist  Interventional Pain Medicine  2024    Disclaimer:  This note was prepared using voice recognition system and is likely to have sound alike errors that may have been  overlooked even after proof reading.  Please call me with any questions.

## 2024-09-05 NOTE — TELEPHONE ENCOUNTER
----- Message from Stu Reed MD sent at 9/5/2024  9:36 AM CDT -----  · Interventional Therapy:  please schedule for lumbar epidural steroid injection at L5-S1  · Sedation: Oral Sedation.  ·  Anticogualtion drugs: Eliquis-Clearance to stop Blood thinner: 3 days

## 2024-09-13 ENCOUNTER — HOSPITAL ENCOUNTER (OUTPATIENT)
Dept: RADIOLOGY | Facility: HOSPITAL | Age: 69
Discharge: HOME | End: 2024-09-13
Attending: ANESTHESIOLOGY
Payer: COMMERCIAL

## 2024-09-13 ENCOUNTER — HOSPITAL ENCOUNTER (OUTPATIENT)
Facility: HOSPITAL | Age: 69
Discharge: HOME | End: 2024-09-13
Attending: ANESTHESIOLOGY | Admitting: ANESTHESIOLOGY
Payer: COMMERCIAL

## 2024-09-13 VITALS
RESPIRATION RATE: 15 BRPM | SYSTOLIC BLOOD PRESSURE: 132 MMHG | OXYGEN SATURATION: 96 % | DIASTOLIC BLOOD PRESSURE: 92 MMHG | HEART RATE: 78 BPM

## 2024-09-13 DIAGNOSIS — M54.16 LUMBAR RADICULITIS: ICD-10-CM

## 2024-09-13 DIAGNOSIS — F41.9 ANXIETY: ICD-10-CM

## 2024-09-13 DIAGNOSIS — M54.16 LUMBAR RADICULOPATHY: Primary | ICD-10-CM

## 2024-09-13 DIAGNOSIS — G89.29 CHRONIC PAIN: ICD-10-CM

## 2024-09-13 PROCEDURE — 63600175 PHARM REV CODE 636 W HCPCS: Performed by: ANESTHESIOLOGY

## 2024-09-13 PROCEDURE — 25500020 PHARM REV CODE 255: Performed by: ANESTHESIOLOGY

## 2024-09-13 PROCEDURE — 76000 FLUOROSCOPY <1 HR PHYS/QHP: CPT | Mod: TC

## 2024-09-13 PROCEDURE — 25000003 PHARM REV CODE 250: Performed by: ANESTHESIOLOGY

## 2024-09-13 PROCEDURE — 62323 NJX INTERLAMINAR LMBR/SAC: CPT | Performed by: ANESTHESIOLOGY

## 2024-09-13 PROCEDURE — 62323 NJX INTERLAMINAR LMBR/SAC: CPT | Mod: ,,, | Performed by: ANESTHESIOLOGY

## 2024-09-13 RX ORDER — LIDOCAINE HYDROCHLORIDE 10 MG/ML
INJECTION, SOLUTION EPIDURAL; INFILTRATION; INTRACAUDAL; PERINEURAL
Status: DISCONTINUED | OUTPATIENT
Start: 2024-09-13 | End: 2024-09-13 | Stop reason: HOSPADM

## 2024-09-13 RX ORDER — DEXAMETHASONE SODIUM PHOSPHATE 10 MG/ML
INJECTION INTRAMUSCULAR; INTRAVENOUS
Status: DISCONTINUED | OUTPATIENT
Start: 2024-09-13 | End: 2024-09-13 | Stop reason: HOSPADM

## 2024-09-13 RX ORDER — LIDOCAINE HYDROCHLORIDE 20 MG/ML
INJECTION, SOLUTION INFILTRATION; PERINEURAL
Status: DISCONTINUED | OUTPATIENT
Start: 2024-09-13 | End: 2024-09-13 | Stop reason: HOSPADM

## 2024-09-13 RX ORDER — ALPRAZOLAM 0.25 MG/1
0.5 TABLET, ORALLY DISINTEGRATING ORAL
Status: DISCONTINUED | OUTPATIENT
Start: 2024-09-13 | End: 2024-09-13 | Stop reason: HOSPADM

## 2024-09-13 RX ADMIN — ALPRAZOLAM 0.5 MG: 0.25 TABLET, ORALLY DISINTEGRATING ORAL at 09:09

## 2024-09-13 NOTE — DISCHARGE SUMMARY
Discharge Note  Short Stay    Admit Date: 9/13/2024    Attending Physician: Stu Reed    Discharge Physician: Stu Reed    Discharge Date: 9/13/2024 9:57 AM    Procedure(s) (LRB):  LUMBAR EPIDURAL STEROID INJECTION (L5-S1) (ORAL SEDATION) (ELIQUIS 3 DAYS) (N/A)    Final Diagnosis: Lumbar radiculitis [M54.16]    Disposition: Home or self care    Patient Instructions:   Current Discharge Medication List        CONTINUE these medications which have NOT CHANGED    Details   albuterol (PROVENTIL) 2.5 mg /3 mL (0.083 %) nebulizer solution Take 3 mLs (2.5 mg total) by nebulization every 6 (six) hours as needed for Wheezing. Rescue  Qty: 90 mL, Refills: 11      albuterol (VENTOLIN HFA) 90 mcg/actuation inhaler Inhale 2 puffs into the lungs every 6 (six) hours as needed for Wheezing. Rescue  Qty: 18 g, Refills: 5    Associated Diagnoses: Pulmonary emphysema, unspecified emphysema type      allopurinoL (ZYLOPRIM) 100 MG tablet Take 1 tablet (100 mg total) by mouth once daily.  Qty: 90 tablet, Refills: 0    Associated Diagnoses: Gout, arthritis      b complex vitamins capsule Take 1 capsule by mouth once daily.       !! budesonide-formoterol 160-4.5 mcg (SYMBICORT) 160-4.5 mcg/actuation HFAA Inhale 2 puffs into the lungs every 12 (twelve) hours. Controller  Qty: 10.2 g, Refills: 5    Associated Diagnoses: Pulmonary emphysema, unspecified emphysema type      cetirizine (ZYRTEC) 10 MG tablet Take 1 tablet by mouth Daily. 1 Tablet Oral Every day      diltiaZEM (TIAZAC) 360 MG Cs24 Take 360 mg by mouth.      esomeprazole (NEXIUM) 40 MG capsule Take 1 capsule (40 mg total) by mouth before breakfast.  Qty: 90 capsule, Refills: 3      fish oil-dha-epa 1,200-144-216 mg Cap Take 1 tablet by mouth Daily. 2 Capsule Oral Every day      mometasone-formoterol (DULERA) 50-5 mcg/actuation HFAA Inhale 2 puffs into the lungs 2 (two) times a day.  Qty: 13 g, Refills: 5      montelukast (SINGULAIR) 10 mg tablet Take 1 tablet (10 mg total)  by mouth every evening.  Qty: 90 tablet, Refills: 3    Associated Diagnoses: Seasonal allergic rhinitis due to pollen      multivitamin (THERAGRAN) per tablet Take 2 tablets by mouth Daily. 1 Tablet Oral Every day      olmesartan-hydrochlorothiazide (BENICAR HCT) 40-25 mg per tablet Take 1 tablet by mouth Daily. 1 Tablet Oral Every day      pravastatin (PRAVACHOL) 40 MG tablet Take 1 tablet by mouth Daily. 1 Tablet Oral Every day      !! SYMBICORT 160-4.5 mcg/actuation HFAA Inhale 2 puffs into the lungs every 12 (twelve) hours. Controller  Qty: 10.2 g, Refills: 11    Associated Diagnoses: Chronic obstructive pulmonary disease with acute exacerbation      colchicine (COLCRYS) 0.6 mg tablet TAKE 1 TABLET BY MOUTH EVERY DAY AS NEEDED FOR GOUT PAIN  Qty: 30 tablet, Refills: 5    Associated Diagnoses: Gout, arthritis      ELIQUIS 5 mg Tab Take 5 mg by mouth 2 (two) times daily.      sildenafil (VIAGRA) 100 MG tablet PLEASE SEE ATTACHED FOR DETAILED DIRECTIONS  Refills: 3       !! - Potential duplicate medications found. Please discuss with provider.          Discharge Diagnosis: Lumbar radiculitis [M54.16]  Condition on Discharge: Stable with no complications to procedure   Diet on Discharge: Same as before.  Activity: as per instruction sheet.  Discharge to: Home with a responsible adult.  Follow up: 2-4 weeks       Please call my office or pager at [phone] if experienced any weakness or loss of sensation, fever > 101.5, pain uncontrolled with oral medications, persistent nausea/vomiting/or diarrhea, redness or drainage from the incisions, or any other worrisome concerns. If physician on call was not reached or could not communicate with our office for any reason please go to the nearest emergency department.     Stu Reed  09/13/2024

## 2024-09-13 NOTE — OP NOTE
Lumbar Interlaminar Epidural Steroid Injection under Fluoroscopic Guidance    The procedure, risks, benefits, and options were discussed with the patient. There are no contraindications to the procedure. The patent expressed understanding and agreed to the procedure. Informed written consent was obtained prior to the start of the procedure and can be found in the patient's chart.    PATIENT NAME: Shawn Hand   MRN: [id number]     DATE OF PROCEDURE: 09/13/2024    PROCEDURE: Lumbar Interlaminar Epidural Steroid Injection L5/S1 under Fluoroscopic Guidance    PRE-OP DIAGNOSIS: Lumbar radiculitis [M54.16] Lumbar radiculopathy [M54.16]    POST-OP DIAGNOSIS: Same    PHYSICIAN: Stu Reed MD    MEDICATIONS INJECTED: Preservative-free Decadron 10mg with 4cc of Lidocaine 1% MPF and preservative free normal saline    LOCAL ANESTHETIC INJECTED: Xylocaine 2%     SEDATION: None    ESTIMATED BLOOD LOSS: None    COMPLICATIONS: None    TECHNIQUE: Time-out was performed to identify the patient and procedure to be performed. With the patient laying in a prone position, the surgical area was prepped and draped in the usual sterile fashion using ChloraPrep and a fenestrated drape. The level was determined under fluoroscopy guidance. Skin anesthesia was achieved by injecting Lidocaine 2% over the injection site. The interlaminar space was then approached with a 20 gauge,  5 inch Tuohy needle that was introduced under fluoroscopic guidance in the AP, lateral and/or contralateral oblique imaging. Once the Ligamentum flavum was encountered loss of resistance to air was used to enter the epidural space. With positive loss of resistance and negative aspiration for CSF or Blood, contrast dye Omnipaque (300mg/mL) was injected to confirm placement and there was no vascular runoff. 4 mL of the medication mixture listed above was injected slowly. Displacement of the radio opaque contrast after injection of the medication confirmed that  the medication went into the area of the epidural space. The needles were removed and bleeding was nil. A sterile dressing was applied. No specimens collected. The patient tolerated the procedure well.       The patient was monitored after the procedure in the recovery area. They were given post-procedure and discharge instructions to follow at home. The patient was discharged in a stable condition.    Stu Reed MD

## 2024-09-13 NOTE — DISCHARGE INSTRUCTIONS
DIET: You may resume your normal diet today.    BATHING: You may resume your normal bathing.          You may shower, no hot water directly on site for 24 hours.    DRESSING: You may remove your bandage today.    ACTIVITY LEVEL: You may resume your normal activities 24 hours after your  procedure.    If you have received sedation or an anesthetic, you may feel sleepy for several hours. Rest until you are more awake. Gradually resume your normal activities tomorrow.    If you have received sedation or an anesthetic, do not drive or operate heavy machinery for at least 24 hours.    MEDICATION: You may resume your normal medications today.    You will receive instructions for any pain prescriptions. Pain medications should be taken only as directed.    SPECIAL INSTRUCTIONS: No heat to the injection site for 24 hours including: bath or shower, heating pad, moist heat, hot tubs.    Use ice pack to injection site for any pain or discomfort. Apply ice pack to 20 minutes then remove for 20 minutes before re-applying to site.    WHEN TO CALL DOCTOR: Redness or swelling around injection site    Fever of 101F    Drainage (pus) from the injection site    For any continuous bleeding (some dried blood over the incision is normal).    FOLLOW UP: Follow up phone call will be made by office.    FOR EMERGENCIES: If any unusual problems or difficulties occur during clinic hours, call [phone] or [phone]. DIET: You may resume your normal diet today.    BATHING: You may resume your normal bathing.          You may shower, no hot water directly on site for 24 hours.    DRESSING: You may remove your bandage today.    ACTIVITY LEVEL: You may resume your normal activities 24 hours after your  procedure.    If you have received sedation or an anesthetic, you may feel sleepy for several hours. Rest until you are more awake. Gradually resume your normal activities tomorrow.    If you have received sedation or an anesthetic, do not drive or  operate heavy machinery for at least 24 hours.    MEDICATION: You may resume your normal medications today.    You will receive instructions for any pain prescriptions. Pain medications should be taken only as directed.    SPECIAL INSTRUCTIONS: No heat to the injection site for 24 hours including: bath or shower, heating pad, moist heat, hot tubs.    Use ice pack to injection site for any pain or discomfort. Apply ice pack to 20 minutes then remove for 20 minutes before re-applying to site.    WHEN TO CALL DOCTOR: Redness or swelling around injection site    Fever of 101F    Drainage (pus) from the injection site    For any continuous bleeding (some dried blood over the incision is normal).    FOLLOW UP: Follow up phone call will be made by office.    FOR EMERGENCIES: If any unusual problems or difficulties occur during clinic hours, call [phone] or [phone].

## 2024-10-14 ENCOUNTER — OFFICE VISIT (OUTPATIENT)
Dept: PAIN MEDICINE | Facility: CLINIC | Age: 69
End: 2024-10-14
Payer: COMMERCIAL

## 2024-10-14 VITALS — WEIGHT: 315 LBS | BODY MASS INDEX: 46.59 KG/M2

## 2024-10-14 DIAGNOSIS — M54.16 LUMBAR RADICULITIS: Primary | ICD-10-CM

## 2024-10-14 PROCEDURE — 1159F MED LIST DOCD IN RCRD: CPT | Mod: CPTII,S$GLB,, | Performed by: ANESTHESIOLOGY

## 2024-10-14 PROCEDURE — 3288F FALL RISK ASSESSMENT DOCD: CPT | Mod: CPTII,S$GLB,, | Performed by: ANESTHESIOLOGY

## 2024-10-14 PROCEDURE — 99213 OFFICE O/P EST LOW 20 MIN: CPT | Mod: S$GLB,,, | Performed by: ANESTHESIOLOGY

## 2024-10-14 PROCEDURE — 1101F PT FALLS ASSESS-DOCD LE1/YR: CPT | Mod: CPTII,S$GLB,, | Performed by: ANESTHESIOLOGY

## 2024-10-14 PROCEDURE — 3008F BODY MASS INDEX DOCD: CPT | Mod: CPTII,S$GLB,, | Performed by: ANESTHESIOLOGY

## 2024-10-14 PROCEDURE — 99999 PR PBB SHADOW E&M-EST. PATIENT-LVL III: CPT | Mod: PBBFAC,,, | Performed by: ANESTHESIOLOGY

## 2024-10-14 NOTE — PROGRESS NOTES
EST patient evaluation  [facility]    Shawn HANNAH  Yazan  : 1955  Date: 10/14/2024     CHIEF COMPLAINT:  No chief complaint on file.    Referring Physician: No ref. provider found  Primary Care Physician: Jaison Tesfaye MD    HPI:  This is a 68 y.o. male with a chief complaint of No chief complaint on file.  . The patient has Past medical history/Past surgical history of  COPD, hypertension, obesity, acid reflux, long-term anticoagulation on Eliquis due to AFib    Patient was evaluated and referred by  primary care provider for low back pain,  left hip pain and left thigh pain.   MRI lumbar spine completed in  showed multilevel degenerative change of the lumbar spine, multilevel facet arthropathy,  most significant at L5-S1 with moderate central canal stenosis and moderate bilateral neural foramina narrowing.  MRI bilateral hip showed moderate degenerative change of the left hip.    Interval History 2024:  Shawn HANNAH  Yazan is here for  postprocedure follow up visit after left hip injection and left GTB injection under fluoroscopic guidance, patient reported 50% improvement in pain and functionality lasting 3-4 days.Current pain score: 4/10    Interval History 10/14/2024:  Shawn CAMDEN Arzate Yazan is here for procedure follow up visit after lumbar epidural steroid injection at L5-S1 on 2024,  patient reported *** % improvement in pain and functionality.  Current pain score: ***/10      Diabetic: No    Anticogualtion drugs: Eliquis    Allergy To Iodine: No    Currently on Antibiotic: No    Current Description of Pain Symptoms:    History of Recent Fall or Trauma: No   Onset: Chronic, started a few years but unable to walk without cane starting on 2024  Pain Location: lower back  Radiates/associated symptoms: radiates down the left thigh,  in addition to decreased sensation to the anterior aspect of the left thigh, tenderness over the left GTB,   he describes his left lower extremity symptoms as more uncomfortable feeling and skin crawling,  that comes and goes completely unpredictable,  associated with pulling feeling of the medial aspect of the left thigh  Pain is Getting worse over the last 2 months    The pain is described as aching, burning, numbing, stabbing, and tingling.   Exacerbating factors:  sitting for a long period of time -standing for a long period of time  Mitigating factors Nothing specific.   Symptoms interfere with daily activity, sleeping, and work("[employer] office, back to light duty).   The patient feels like symptoms have been worsening.   Patient denies night fever/night sweats, urinary incontinence, bowel incontinence, significant weight loss, significant motor weakness, and loss of sensations.    Pain score:   Best: 4/10  Worst: 8/10    Current pain medications:   Over-the-counter Tylenol  Current Narcotics/Opioid /benzo Medications:  Opioids- No  Benzodiazepines: No    UDS:  NA    PDMP:  Reviewed and consistent with medication use as prescribed.     Previous Chronic Pain Treatment History:  Physical Therapy/HEP/Physician Lead Exercise Program:  Over the past 12 months, Patient has done 0 sessions.  PT response: NA  Dates of the PT sessions: NA  Is patient participating in home exercise program (HEP)/ physician led exercise program: No.    Non-interventional Pain Therapy:  [x]Chiropractor: 2024, 15 sessions in July and August 2024  []Acupuncture/Dry needle.  [x]TENS unit.  []Heat/ICE.  []Back Brace.    Medications previously tried:  NSAIDs: None  Topical Agent: Yes  TCA/SSRI/SNRI: None  Anti-convulsants: Gabapentin  due to SE and weight gain.  Muscle Relaxants: None  Opioids- Hydrocodone with Acetaminophen (Norco) and Tramadol (Ultram).    Interventional Pain Procedures:  Left Hip GTB Injection 07/2024- mild relief  08/2024: left hip injection and left GTB injection under fluoroscopic guidance- 50% relief for  few days  Previous  "spine/Relevant joint surgery:  Bilateral rotator cuff surgery  Surgical History:   has a past surgical history that includes Rotator cuff repair (Left); Cholecystectomy; Tonsillectomy; Coronary angioplasty with stent; Colonoscopy; Sinus surgery; Cardioversion; Rotator cuff repair (Right, 06/27/2017); Injection of joint (Left, 8/16/2024); and Epidural steroid injection into lumbar spine (N/A, 9/13/2024).  Medical History:   has a past medical history of Atrial fibrillation, COPD (chronic obstructive pulmonary disease), Coronary artery disease, GERD (gastroesophageal reflux disease), Hyperlipidemia, Hypertension, and Sleep apnea.  Family History:  family history includes Asthma in his mother; COPD in his mother; Heart disease in his father.  Allergies:  Niaspan extended-release [niacin], Amoxicillin-pot clavulanate, and Nystatin   Social History/SUBSTANCE ABUSE HISTORY:  Personal history of substance abuse: No   reports that he quit smoking about 9 years ago. His smoking use included cigarettes. He has been exposed to tobacco smoke. He has never used smokeless tobacco. He reports that he does not currently use alcohol. He reports that he does not use drugs.  LABS:  CBC  Lab Results   Component Value Date    WBC 10.01 06/22/2024    HGB 16.6 06/22/2024    HCT 49.5 06/22/2024     Coagulation Profile   Lab Results   Component Value Date     06/22/2024     No results found for: "PT", "PTT", "INR"  CMP:  BMP  Lab Results   Component Value Date     06/22/2024    K 4.3 06/22/2024     06/22/2024    CO2 26 06/22/2024    BUN 19 06/22/2024    CREATININE 1.0 06/22/2024    CALCIUM 10.0 06/22/2024    ANIONGAP 11 06/22/2024    EGFRNORACEVR >60 06/22/2024     Lab Results   Component Value Date    ALT 29 06/22/2024    AST 25 06/22/2024    ALKPHOS 47 (L) 06/22/2024    BILITOT 0.5 06/22/2024     HGBA1C:  Lab Results   Component Value Date    HGBA1C 5.5 08/11/2023       ROS:    Review of Systems   GENERAL:  No weight " loss, malaise or fevers.  HEENT:   No recent changes in vision or hearing  NECK:  Negative for lumps, no difficulty with swallowing.  RESPIRATORY:  Negative for cough, wheezing or shortness of breath, patient denies any recent URI.  CARDIOVASCULAR:  Negative for chest pain or palpitations.  GI:  Negative for abdominal discomfort, blood in stools or black stools or change in bowel habits.  MUSCULOSKELETAL:  See HPI.  SKIN:  Negative for lesions, rash, and itching.  PSYCH:  No mood disorder or recent psychosocial stressors.   HEMATOLOGY/LYMPHOLOGY:  See the blood thinner sectioned in HPI.  NEURO:  See HPI  All other reviewed and negative other than HPI.    PHYSICAL EXAM:  VITALS: There were no vitals taken for this visit.  There is no height or weight on file to calculate BMI.  GENERAL: Well appearing, in no acute distress, alert and oriented x3, answers questions appropriately.   PSYCH: Flat affect.  SKIN: Skin color, texture, turgor normal, no rashes or lesions.  HEAD/FACE:  Normocephalic, atraumatic. Cranial nerves grossly intact.  CV: Regular rate  PULM: No evidence of respiratory difficulty, symmetric chest rise.  GI:  Soft and non-Distended.  BACK/SIJ/HIP:  Lumbar Spine Exam:       Inspection: No erythema, bruising.       Palpation: (+) TTP of lumbar paraspinals bilaterally      ROM:  Limited in flexion, extension, lateral bending.       (+) Facet loading bilaterally      (NA) Straight Leg Raise bilaterally      (NA) JIM bilaterally, Tenderness over the PSIS, Yeoman test  Hip Exam:      Inspection: No gross deformity or apparent leg length discrepancy      Palpation:  No TTP to bilateral greater trochanteric bursas.       ROM:  No limitation or pain in internal rotation, external rotation b/l  Neurologic Exam:     Alert. Speech is fluent and appropriate.     Strength: 3/5 in  left hip flexion and knee extension     Sensation:   decreased sensation to the left lower extremity      Tone: No abnormality  appreciated in bilateral lower extremities  GAIT: Antalgic gait, unsteady gait, using cane    DIAGNOSTIC STUDIES AND MEDICAL RECORDS REVIEW:  I have personally reviewed and interpreted relevant radiology reports and reviewed relevant records from other services in the EMR.   MRI lumbar spine 2024    At T12-L1, no disc herniation, central canal stenosis or neural foraminal narrowing.     At L1-2, no disc herniation, central canal stenosis or neural foraminal narrowing.     At L2-3 circumferential disc bulge with facet arthropathy contributing to mild left neural foraminal narrowing.  No central canal stenosis.     At L3-4, circumferential disc bulge extending into both neural foramina with facet arthropathy contributing to mild central canal stenosis with mild left neural foraminal narrowing.     At L4-5, circumferential disc bulge with ligamentum flavum hypertrophy and facet arthropathy contributing to minimal central canal stenosis without neural foraminal narrowing.     At L5-S1, circumferential disc bulge extending into both neural foramina with osseous spurring in the neural foramina and facet arthropathy contributing to moderate central canal stenosis with moderate bilateral neural foraminal narrowing, worse on the right.    MRI bilateral hip 2024  No acetabular labral tear is visible on this non-arthrographic exam.     The bones exhibit normal marrow signal without evidence for fracture, stress fracture, or osseous contusion.     The right hip demonstrates moderate chondral thinning without high-grade chondral defect identified.  There is mild subchondral cystic change of the acetabular roof.    There are low-grade strains of the left gluteus medius and minimus tendons at their greater trochanteric insertions.     Impression:     Moderate degenerative change of the left hip.  Low-grade strains of the left gluteus medius and minimus tendons.    Clinical Impression:  This is a pleasant 68 y.o. male patient with  PMH/PSH of COPD, hypertension, obesity, acid reflux, long-term anticoagulation on Eliquis due to AFib, presenting with  axial low back pain in addition to uncomfortable /skin crawling feeling of the left thigh in addition to pulling sensation in medial aspect of the left thigh, he had limited relief from left hip injection which rule out  hip pathology, his MRI lumbar spine showed disc herniation at L4-L5 with bilateral neural foramina narrowing, worse in the right side,  I will address his low back pain 1st by providing him with a lumbar epidural steroid injection, if he has no relief can consider nerve conduction study in the left lower extremities in addition to lumbar medial branch block  Encounter Diagnosis:  Shawn Hand is a 68 y.o. male with the following diagnoses based on history, exam, and imaging:  There are no diagnoses linked to this encounter.  --------------------------------------------------------    Treatment Plan:    Diagnostics/Referrals: continue with a home exercise    Medications:    NSAIDs: None  Topical Agent: No  TCA/SSRI/SNRI: None  Anti-convulsants: None  Muscle Relaxants: None  Opioids:  none    Interventional Therapy:  please schedule for lumbar epidural steroid injection at L5-S1  Sedation: Oral Sedation.   Anticogualtion drugs: Eliquis-Clearance to stop Blood thinner: 3 days    Regarding the above interventions, the patient has been educated regarding the risks (including bleeding, infection, increased pain, nerve damage, or allergic reaction), benefits, and alternatives. The patient states he understands and is eager to proceed.    Physical Rehabilitation: Physician led exercise program and home exercise    Patient Education: Counseled patient regarding the importance of activity modification, I have stressed the importance of physical activity and a home exercise plan to help with pain and improve health.    Follow-up: RTC 4 weeks    May consider:  EMG/nerve conduction study  Left lower extremity, lumbar medial branch block, neurology referral, may consider Giuseppe Reed MD  Anesthesiologist  Interventional Pain Medicine  10/14/2024    Disclaimer:  This note was prepared using voice recognition system and is likely to have sound alike errors that may have been overlooked even after proof reading.  Please call me with any questions.

## 2024-10-14 NOTE — PROGRESS NOTES
EST patient evaluation  [facility] intervention[facility]    Shawn Hand  : 1955  Date: 10/14/2024     CHIEF COMPLAINT:  Follow-up (Injection )    Referring Physician: No ref. provider found  Primary Care Physician: Jaison Tesfaye MD    HPI:  This is a 68 y.o. male with a chief complaint of Follow-up (Injection )  . The patient has Past medical history/Past surgical history of  COPD, hypertension, obesity, acid reflux, long-term anticoagulation on Eliquis due to AFib    Patient was evaluated and referred by  primary care provider for low back pain,  left hip pain and left thigh pain.   MRI lumbar spine completed in  showed multilevel degenerative change of the lumbar spine, multilevel facet arthropathy,  most significant at L5-S1 with moderate central canal stenosis and moderate bilateral neural foramina narrowing.  MRI bilateral hip showed moderate degenerative change of the left hip.    Interval History 2024:  Shawn HANNAH  Yazan is here for  postprocedure follow up visit after left hip injection and left GTB injection under fluoroscopic guidance, patient reported 50% improvement in pain and functionality lasting 3-4 days.Current pain score: 4/10    Interval History 10/14/2024:  Shawn Hand is here for procedure follow up visit after lumbar epidural steroid injection at L5-S1 on 2024,  patient reported 80% improvement in pain and functionality. Patient is able to walk without his cane. Patient is still feeling a slight pain in the lower back and inner thighs after sitting or walking too long but it is no where as severe as bad as it was prior.  Current pain score: 1/10    Diabetic: No    Anticogualtion drugs: Eliquis    Allergy To Iodine: No    Currently on Antibiotic: No    Current Description of Pain Symptoms:    History of Recent Fall or Trauma: No   Onset: Chronic, started a few years but unable to walk without cane starting on 2024  Pain  Location: lower back  Radiates/associated symptoms: radiates down the left thigh,  in addition to decreased sensation to the anterior aspect of the left thigh, tenderness over the left GTB,  he describes his left lower extremity symptoms as more uncomfortable feeling and skin crawling,  that comes and goes completely unpredictable,  associated with pulling feeling of the medial aspect of the left thigh  Pain is Getting worse over the last 2 months    The pain is described as aching, burning, numbing, stabbing, and tingling.   Exacerbating factors:  sitting for a long period of time -standing for a long period of time  Mitigating factors Nothing specific.   Symptoms interfere with daily activity, sleeping, and work([employer] office, back to light duty).   The patient feels like symptoms have been worsening.   Patient denies night fever/night sweats, urinary incontinence, bowel incontinence, significant weight loss, significant motor weakness, and loss of sensations.    Pain score:   Best: 4/10  Worst: 8/10    Current pain medications:   Over-the-counter Tylenol  Current Narcotics/Opioid /benzo Medications:  Opioids- No  Benzodiazepines: No    UDS:  NA    PDMP:  Reviewed and consistent with medication use as prescribed.     Previous Chronic Pain Treatment History:  Physical Therapy/HEP/Physician Lead Exercise Program:  Over the past 12 months, Patient has done 0 sessions.  PT response: NA  Dates of the PT sessions: NA  Is patient participating in home exercise program (HEP)/ physician led exercise program: No.    Non-interventional Pain Therapy:  [x]Chiropractor: 2024, 15 sessions in July and August 2024  []Acupuncture/Dry needle.  [x]TENS unit.  []Heat/ICE.  []Back Brace.    Medications previously tried:  NSAIDs: None  Topical Agent: Yes  TCA/SSRI/SNRI: None  Anti-convulsants: Gabapentin  due to SE and weight gain.  Muscle Relaxants: None  Opioids- Hydrocodone with Acetaminophen (Norco) and Tramadol  "(Ultram).    Interventional Pain Procedures:  Left Hip GTB Injection 07/2024- mild relief  08/2024: left hip injection and left GTB injection under fluoroscopic guidance- 50% relief for  few days  09/13/2024: lumbar epidural steroid injection at L5-S1 -  80% relief   Previous spine/Relevant joint surgery:  Bilateral rotator cuff surgery  Surgical History:   has a past surgical history that includes Rotator cuff repair (Left); Cholecystectomy; Tonsillectomy; Coronary angioplasty with stent; Colonoscopy; Sinus surgery; Cardioversion; Rotator cuff repair (Right, 06/27/2017); Injection of joint (Left, 8/16/2024); and Epidural steroid injection into lumbar spine (N/A, 9/13/2024).  Medical History:   has a past medical history of Atrial fibrillation, COPD (chronic obstructive pulmonary disease), Coronary artery disease, GERD (gastroesophageal reflux disease), Hyperlipidemia, Hypertension, and Sleep apnea.  Family History:  family history includes Asthma in his mother; COPD in his mother; Heart disease in his father.  Allergies:  Niaspan extended-release [niacin], Amoxicillin-pot clavulanate, and Nystatin   Social History/SUBSTANCE ABUSE HISTORY:  Personal history of substance abuse: No   reports that he quit smoking about 9 years ago. His smoking use included cigarettes. He has been exposed to tobacco smoke. He has never used smokeless tobacco. He reports that he does not currently use alcohol. He reports that he does not use drugs.  LABS:  CBC  Lab Results   Component Value Date    WBC 10.01 06/22/2024    HGB 16.6 06/22/2024    HCT 49.5 06/22/2024     Coagulation Profile   Lab Results   Component Value Date     06/22/2024     No results found for: "PT", "PTT", "INR"  CMP:  BMP  Lab Results   Component Value Date     06/22/2024    K 4.3 06/22/2024     06/22/2024    CO2 26 06/22/2024    BUN 19 06/22/2024    CREATININE 1.0 06/22/2024    CALCIUM 10.0 06/22/2024    ANIONGAP 11 06/22/2024    EGFRNORACEVR >60 " 06/22/2024     Lab Results   Component Value Date    ALT 29 06/22/2024    AST 25 06/22/2024    ALKPHOS 47 (L) 06/22/2024    BILITOT 0.5 06/22/2024     HGBA1C:  Lab Results   Component Value Date    HGBA1C 5.5 08/11/2023       ROS:    Review of Systems   GENERAL:  No weight loss, malaise or fevers.  HEENT:   No recent changes in vision or hearing  NECK:  Negative for lumps, no difficulty with swallowing.  RESPIRATORY:  Negative for cough, wheezing or shortness of breath, patient denies any recent URI.  CARDIOVASCULAR:  Negative for chest pain or palpitations.  GI:  Negative for abdominal discomfort, blood in stools or black stools or change in bowel habits.  MUSCULOSKELETAL:  See HPI.  SKIN:  Negative for lesions, rash, and itching.  PSYCH:  No mood disorder or recent psychosocial stressors.   HEMATOLOGY/LYMPHOLOGY:  See the blood thinner sectioned in HPI.  NEURO:  See HPI  All other reviewed and negative other than HPI.    PHYSICAL EXAM:  VITALS: Wt (!) 147.3 kg (324 lb 11.2 oz)   BMI 46.59 kg/m²   Body mass index is 46.59 kg/m².  GENERAL: Well appearing, in no acute distress, alert and oriented x3, answers questions appropriately.   PSYCH: Flat affect.  SKIN: Skin color, texture, turgor normal, no rashes or lesions.  HEAD/FACE:  Normocephalic, atraumatic. Cranial nerves grossly intact.  CV: Regular rate  PULM: No evidence of respiratory difficulty, symmetric chest rise.  GI:  Soft and non-Distended.  BACK/SIJ/HIP:  Lumbar Spine Exam:       Inspection: No erythema, bruising.       Palpation: (+) TTP of lumbar paraspinals bilaterally      ROM:  Limited in flexion, extension, lateral bending.       (+) Facet loading bilaterally      (+) Straight Leg Raise bilaterally      (NA) JIM bilaterally, Tenderness over the PSIS, Yeoman test  Hip Exam:      Inspection: No gross deformity or apparent leg length discrepancy      Palpation:  No TTP to bilateral greater trochanteric bursas.       ROM:  No limitation or pain in  internal rotation, external rotation b/l  Neurologic Exam:     Alert. Speech is fluent and appropriate.     Strength: 3/5 in  left hip flexion and knee extension     Sensation:   decreased sensation to the left lower extremity      Tone: No abnormality appreciated in bilateral lower extremities  GAIT: Antalgic gait, unsteady gait, using cane    DIAGNOSTIC STUDIES AND MEDICAL RECORDS REVIEW:  I have personally reviewed and interpreted relevant radiology reports and reviewed relevant records from other services in the EMR.   MRI lumbar spine 2024    At T12-L1, no disc herniation, central canal stenosis or neural foraminal narrowing.     At L1-2, no disc herniation, central canal stenosis or neural foraminal narrowing.     At L2-3 circumferential disc bulge with facet arthropathy contributing to mild left neural foraminal narrowing.  No central canal stenosis.     At L3-4, circumferential disc bulge extending into both neural foramina with facet arthropathy contributing to mild central canal stenosis with mild left neural foraminal narrowing.     At L4-5, circumferential disc bulge with ligamentum flavum hypertrophy and facet arthropathy contributing to minimal central canal stenosis without neural foraminal narrowing.     At L5-S1, circumferential disc bulge extending into both neural foramina with osseous spurring in the neural foramina and facet arthropathy contributing to moderate central canal stenosis with moderate bilateral neural foraminal narrowing, worse on the right.    MRI bilateral hip 2024  No acetabular labral tear is visible on this non-arthrographic exam.     The bones exhibit normal marrow signal without evidence for fracture, stress fracture, or osseous contusion.     The right hip demonstrates moderate chondral thinning without high-grade chondral defect identified.  There is mild subchondral cystic change of the acetabular roof.    There are low-grade strains of the left gluteus medius and minimus  tendons at their greater trochanteric insertions.     Impression:     Moderate degenerative change of the left hip.  Low-grade strains of the left gluteus medius and minimus tendons.    Clinical Impression:  This is a pleasant 68 y.o. male patient with PMH/PSH of COPD, hypertension, obesity, acid reflux, long-term anticoagulation on Eliquis due to AFib, presenting with  axial low back pain in addition to uncomfortable /skin crawling feeling of the left thigh in addition to pulling sensation in medial aspect of the left thigh, he had limited relief from left hip injection which rule out  hip pathology, his MRI lumbar spine showed disc herniation at L4-L5 with bilateral neural foramina narrowing, worse in the right side,  he had lumbar epidural steroid injection at L5-S1 which provided him with significant improvement in pain and functionality, he does not use his cane anymore, he is very happy and satisfied with the current pain management plan.    Encounter Diagnosis:  Shawn Hand is a 68 y.o. male with the following diagnoses based on history, exam, and imagin. Lumbar radiculitis      Treatment Plan:    Diagnostics/Referrals: continue with a home exercise    Medications:    NSAIDs: None  Topical Agent: No  TCA/SSRI/SNRI: None  Anti-convulsants: None  Muscle Relaxants: None  Opioids:  none    Interventional Therapy:  please  consider repeating lumbar epidural steroid injection at L5-S1  Sedation: Oral Sedation.   Anticogualtion drugs: Eliquis-Clearance to stop Blood thinner: 3 days    Regarding the above interventions, the patient has been educated regarding the risks (including bleeding, infection, increased pain, nerve damage, or allergic reaction), benefits, and alternatives. The patient states he understands and is eager to proceed.    Physical Rehabilitation: Physician led exercise program and home exercise    Patient Education: Counseled patient regarding the importance of activity modification, I  have stressed the importance of physical activity and a home exercise plan to help with pain and improve health.    Follow-up: RTC three-months    May consider:  EMG/nerve conduction study Left lower extremity, lumbar medial branch block, neurology referral, may consider Giuseppe Reed MD  Anesthesiologist  Interventional Pain Medicine  10/14/2024    Disclaimer:  This note was prepared using voice recognition system and is likely to have sound alike errors that may have been overlooked even after proof reading.  Please call me with any questions.

## 2024-10-21 ENCOUNTER — TELEPHONE (OUTPATIENT)
Dept: PAIN MEDICINE | Facility: CLINIC | Age: 69
End: 2024-10-21
Payer: COMMERCIAL

## 2024-11-05 DIAGNOSIS — M10.9 GOUT, ARTHRITIS: ICD-10-CM

## 2024-11-05 DIAGNOSIS — J30.1 SEASONAL ALLERGIC RHINITIS DUE TO POLLEN: ICD-10-CM

## 2024-11-05 RX ORDER — ALLOPURINOL 100 MG/1
100 TABLET ORAL DAILY
Qty: 90 TABLET | Refills: 0 | Status: SHIPPED | OUTPATIENT
Start: 2024-11-05

## 2024-11-05 RX ORDER — MONTELUKAST SODIUM 10 MG/1
10 TABLET ORAL NIGHTLY
Qty: 90 TABLET | Refills: 3 | Status: SHIPPED | OUTPATIENT
Start: 2024-11-05

## 2024-11-05 NOTE — TELEPHONE ENCOUNTER
Pt requesting medication refill. LOV:  08/15/24  Pharm: [facility]ACY #[id number] - [place], [place] [id number]  Requested Prescriptions     Pending Prescriptions Disp Refills    montelukast (SINGULAIR) 10 mg tablet 90 tablet 3     Sig: Take 1 tablet (10 mg total) by mouth every evening.    allopurinoL (ZYLOPRIM) 100 MG tablet 90 tablet 0     Sig: Take 1 tablet (100 mg total) by mouth once daily.

## 2024-11-05 NOTE — TELEPHONE ENCOUNTER
Care Due:                  Date            Visit Type   Department     Provider  --------------------------------------------------------------------------------                                EP -                              PRIMARY      [facility]  Last Visit: 08-      CARE ([facility])   MEDICINE       Jaison Tesfaye  Next Visit: None Scheduled  None         None Found                                                            Last  Test          Frequency    Reason                     Performed    Due Date  --------------------------------------------------------------------------------    Uric Acid...  12 months..  allopurinoL..............  Not Found    Overdue    [employer] Embedded Care Due Messages. Reference number: [id number].   11/05/2024 8:36:14 AM CST

## 2024-11-05 NOTE — TELEPHONE ENCOUNTER
----- Message from Pilar sent at 2024  8:14 AM CST -----  Contact: self  Shawn Hand  MRN: [id number]  : 1955  PCP: Jaison Tesfaye  Home Phone      [phone]  Work Phone      Not on file.  Mobile          [phone]      MESSAGE:   Pt requesting refill or new Rx.   Is this a refill or new RX:  refill  RX name and strength: montelukast (SINGULAIR) 10 mg tablet allopurinoL (ZYLOPRIM) 100 MG tablet   Last office visit: 2024  Is this a 30-day or 90-day RX:  90  Pharmacy name and location:  [facility]ACY [id number] [address]      Comments:      Phone:  [phone]

## 2024-11-15 ENCOUNTER — TELEPHONE (OUTPATIENT)
Dept: PAIN MEDICINE | Facility: CLINIC | Age: 69
End: 2024-11-15

## 2024-11-15 NOTE — TELEPHONE ENCOUNTER
----- Message from BUCKY Aleman sent at 2024 11:56 AM CST -----  Contact: Wife, Sarah    ----- Message -----  From: Trixie Santos  Sent: 2024  11:09 AM CST  To: Brianna Peraza Staff    Shawn HANNAH Sr Yazan  MRN: [id number]  : 1955  PCP: Jaison Tesfaye  Home Phone      [phone]  Work Phone      Not on file.  Mobile          [phone]      MESSAGE: Patient's wife would like a returned call to discuss scheduling back injections for the patient.     PHONE; [phone]

## 2024-11-19 ENCOUNTER — TELEPHONE (OUTPATIENT)
Dept: PAIN MEDICINE | Facility: CLINIC | Age: 69
End: 2024-11-19

## 2024-11-19 ENCOUNTER — TELEPHONE (OUTPATIENT)
Dept: PAIN MEDICINE | Facility: CLINIC | Age: 69
End: 2024-11-19
Payer: COMMERCIAL

## 2024-11-19 ENCOUNTER — OFFICE VISIT (OUTPATIENT)
Dept: PAIN MEDICINE | Facility: CLINIC | Age: 69
End: 2024-11-19
Payer: COMMERCIAL

## 2024-11-19 VITALS — BODY MASS INDEX: 45.1 KG/M2 | WEIGHT: 315 LBS | HEIGHT: 70 IN

## 2024-11-19 DIAGNOSIS — M54.16 LUMBAR RADICULITIS: Primary | ICD-10-CM

## 2024-11-19 DIAGNOSIS — M47.816 LUMBAR FACET ARTHROPATHY: ICD-10-CM

## 2024-11-19 PROCEDURE — 1159F MED LIST DOCD IN RCRD: CPT | Mod: CPTII,S$GLB,, | Performed by: ANESTHESIOLOGY

## 2024-11-19 PROCEDURE — 99214 OFFICE O/P EST MOD 30 MIN: CPT | Mod: S$GLB,,, | Performed by: ANESTHESIOLOGY

## 2024-11-19 PROCEDURE — 3008F BODY MASS INDEX DOCD: CPT | Mod: CPTII,S$GLB,, | Performed by: ANESTHESIOLOGY

## 2024-11-19 PROCEDURE — 1160F RVW MEDS BY RX/DR IN RCRD: CPT | Mod: CPTII,S$GLB,, | Performed by: ANESTHESIOLOGY

## 2024-11-19 PROCEDURE — 99999 PR PBB SHADOW E&M-EST. PATIENT-LVL II: CPT | Mod: PBBFAC,,, | Performed by: ANESTHESIOLOGY

## 2024-11-19 NOTE — TELEPHONE ENCOUNTER
----- Message from Mya sent at 2024 10:45 AM CST -----  Contact: WIFE - OMAR Hand  MRN: [id number]  : 1955  PCP: Jaison Tesfaye  Home Phone      [phone]  Work Phone      Not on file.  Mobile          [phone]      MESSAGE:Wife states that the patient is in severe pain and really feels that the patient needs to be seen ASAP.        Phone: [phone]

## 2024-11-19 NOTE — TELEPHONE ENCOUNTER
----- Message from Stu Reed MD sent at 11/19/2024  3:31 PM CST -----  · Interventional Therapy:  lumbar epidural steroid injection at L5-S1, left sided   · Sedation: Oral Sedation.  ·  Anticogualtion drugs: Eliquis-Clearance to stop Blood thinner: 3 days

## 2024-11-19 NOTE — PROGRESS NOTES
EST patient evaluation  [facility]    Shawn Hand  : 1955  Date: 2024     CHIEF COMPLAINT:  No chief complaint on file.    Referring Physician: No ref. provider found  Primary Care Physician: Jaison Tesfaye MD    HPI:  This is a 68 y.o. male with a chief complaint of No chief complaint on file.  . The patient has Past medical history/Past surgical history of  COPD, hypertension, obesity, acid reflux, long-term anticoagulation on Eliquis due to AFib    Patient was evaluated and referred by  primary care provider for low back pain,  left hip pain and left thigh pain.   MRI lumbar spine completed in  showed multilevel degenerative change of the lumbar spine, multilevel facet arthropathy,  most significant at L5-S1 with moderate central canal stenosis and moderate bilateral neural foramina narrowing.  MRI bilateral hip showed moderate degenerative change of the left hip.    Interval History 2024:  Shawn Hand is here for  postprocedure follow up visit after left hip injection and left GTB injection under fluoroscopic guidance, patient reported 50% improvement in pain and functionality lasting 3-4 days.Current pain score: 410    Interval History 10/14/2024:  Shawn Hand is here for procedure follow up visit after lumbar epidural steroid injection at L5-S1 on 2024,  patient reported 80% improvement in pain and functionality. Patient is able to walk without his cane. Patient is still feeling a slight pain in the lower back and inner thighs after sitting or walking too long but it is no where as severe as bad as it was prior.  Current pain score: 10    Interval History 2024:  Shawn Hand is here for *** follow up visit after***    Shawn Hand is here for procedure follow up visit after***,  patient reported *** % improvement in pain and functionality.  Current pain score: ***/10      Diabetic:  No    Anticogualtion drugs: Eliquis    Allergy To Iodine: No    Currently on Antibiotic: No    Current Description of Pain Symptoms:    History of Recent Fall or Trauma: No   Onset: Chronic, started a few years but unable to walk without cane starting on June 28, 2024  Pain Location: lower back  Radiates/associated symptoms: radiates down the left thigh,  in addition to decreased sensation to the anterior aspect of the left thigh, tenderness over the left GTB,  he describes his left lower extremity symptoms as more uncomfortable feeling and skin crawling,  that comes and goes completely unpredictable,  associated with pulling feeling of the medial aspect of the left thigh  Pain is Getting worse over the last 2 months    The pain is described as aching, burning, numbing, stabbing, and tingling.   Exacerbating factors:  sitting for a long period of time -standing for a long period of time  Mitigating factors Nothing specific.   Symptoms interfere with daily activity, sleeping, and work([employer] office, back to light duty).   The patient feels like symptoms have been worsening.   Patient denies night fever/night sweats, urinary incontinence, bowel incontinence, significant weight loss, significant motor weakness, and loss of sensations.    Pain score:   Best: 4/10  Worst: 8/10    Current pain medications:   Over-the-counter Tylenol  Current Narcotics/Opioid /benzo Medications:  Opioids- No  Benzodiazepines: No    UDS:  NA    PDMP:  Reviewed and consistent with medication use as prescribed.     Previous Chronic Pain Treatment History:  Physical Therapy/HEP/Physician Lead Exercise Program:  Over the past 12 months, Patient has done 0 sessions.  PT response: NA  Dates of the PT sessions: NA  Is patient participating in home exercise program (HEP)/ physician led exercise program: No.    Non-interventional Pain Therapy:  [x]Chiropractor: 2024, 15 sessions in July and August 2024  []Acupuncture/Dry needle.  [x]TENS  "unit.  []Heat/ICE.  []Back Brace.    Medications previously tried:  NSAIDs: None  Topical Agent: Yes  TCA/SSRI/SNRI: None  Anti-convulsants: Gabapentin  due to SE and weight gain.  Muscle Relaxants: None  Opioids- Hydrocodone with Acetaminophen (Norco) and Tramadol (Ultram).    Interventional Pain Procedures:  Left Hip GTB Injection 07/2024- mild relief  08/2024: left hip injection and left GTB injection under fluoroscopic guidance- 50% relief for  few days  09/13/2024: lumbar epidural steroid injection at L5-S1 -  80% relief   Previous spine/Relevant joint surgery:  Bilateral rotator cuff surgery  Surgical History:   has a past surgical history that includes Rotator cuff repair (Left); Cholecystectomy; Tonsillectomy; Coronary angioplasty with stent; Colonoscopy; Sinus surgery; Cardioversion; Rotator cuff repair (Right, 06/27/2017); Injection of joint (Left, 8/16/2024); and Epidural steroid injection into lumbar spine (N/A, 9/13/2024).  Medical History:   has a past medical history of Atrial fibrillation, COPD (chronic obstructive pulmonary disease), Coronary artery disease, GERD (gastroesophageal reflux disease), Hyperlipidemia, Hypertension, and Sleep apnea.  Family History:  family history includes Asthma in his mother; COPD in his mother; Heart disease in his father.  Allergies:  Niaspan extended-release [niacin], Amoxicillin-pot clavulanate, and Nystatin   Social History/SUBSTANCE ABUSE HISTORY:  Personal history of substance abuse: No   reports that he quit smoking about 9 years ago. His smoking use included cigarettes. He has been exposed to tobacco smoke. He has never used smokeless tobacco. He reports that he does not currently use alcohol. He reports that he does not use drugs.  LABS:  CBC  Lab Results   Component Value Date    WBC 10.01 06/22/2024    HGB 16.6 06/22/2024    HCT 49.5 06/22/2024     Coagulation Profile   Lab Results   Component Value Date     06/22/2024     No results found for: "PT", " ""PTT", "INR"  CMP:  BMP  Lab Results   Component Value Date     06/22/2024    K 4.3 06/22/2024     06/22/2024    CO2 26 06/22/2024    BUN 19 06/22/2024    CREATININE 1.0 06/22/2024    CALCIUM 10.0 06/22/2024    ANIONGAP 11 06/22/2024    EGFRNORACEVR >60 06/22/2024     Lab Results   Component Value Date    ALT 29 06/22/2024    AST 25 06/22/2024    ALKPHOS 47 (L) 06/22/2024    BILITOT 0.5 06/22/2024     HGBA1C:  Lab Results   Component Value Date    HGBA1C 5.5 08/11/2023       ROS:    Review of Systems   GENERAL:  No weight loss, malaise or fevers.  HEENT:   No recent changes in vision or hearing  NECK:  Negative for lumps, no difficulty with swallowing.  RESPIRATORY:  Negative for cough, wheezing or shortness of breath, patient denies any recent URI.  CARDIOVASCULAR:  Negative for chest pain or palpitations.  GI:  Negative for abdominal discomfort, blood in stools or black stools or change in bowel habits.  MUSCULOSKELETAL:  See HPI.  SKIN:  Negative for lesions, rash, and itching.  PSYCH:  No mood disorder or recent psychosocial stressors.   HEMATOLOGY/LYMPHOLOGY:  See the blood thinner sectioned in HPI.  NEURO:  See HPI  All other reviewed and negative other than HPI.    PHYSICAL EXAM:  VITALS: There were no vitals taken for this visit.  There is no height or weight on file to calculate BMI.  GENERAL: Well appearing, in no acute distress, alert and oriented x3, answers questions appropriately.   PSYCH: Flat affect.  SKIN: Skin color, texture, turgor normal, no rashes or lesions.  HEAD/FACE:  Normocephalic, atraumatic. Cranial nerves grossly intact.  CV: Regular rate  PULM: No evidence of respiratory difficulty, symmetric chest rise.  GI:  Soft and non-Distended.  BACK/SIJ/HIP:  Lumbar Spine Exam:       Inspection: No erythema, bruising.       Palpation: (+) TTP of lumbar paraspinals bilaterally      ROM:  Limited in flexion, extension, lateral bending.       (+) Facet loading bilaterally      (+) " Straight Leg Raise bilaterally      (NA) JIM bilaterally, Tenderness over the PSIS, Yeoman test  Hip Exam:      Inspection: No gross deformity or apparent leg length discrepancy      Palpation:  No TTP to bilateral greater trochanteric bursas.       ROM:  No limitation or pain in internal rotation, external rotation b/l  Neurologic Exam:     Alert. Speech is fluent and appropriate.     Strength: 3/5 in  left hip flexion and knee extension     Sensation:   decreased sensation to the left lower extremity      Tone: No abnormality appreciated in bilateral lower extremities  GAIT: Antalgic gait, unsteady gait, using cane    DIAGNOSTIC STUDIES AND MEDICAL RECORDS REVIEW:  I have personally reviewed and interpreted relevant radiology reports and reviewed relevant records from other services in the EMR.   MRI lumbar spine 2024    At T12-L1, no disc herniation, central canal stenosis or neural foraminal narrowing.     At L1-2, no disc herniation, central canal stenosis or neural foraminal narrowing.     At L2-3 circumferential disc bulge with facet arthropathy contributing to mild left neural foraminal narrowing.  No central canal stenosis.     At L3-4, circumferential disc bulge extending into both neural foramina with facet arthropathy contributing to mild central canal stenosis with mild left neural foraminal narrowing.     At L4-5, circumferential disc bulge with ligamentum flavum hypertrophy and facet arthropathy contributing to minimal central canal stenosis without neural foraminal narrowing.     At L5-S1, circumferential disc bulge extending into both neural foramina with osseous spurring in the neural foramina and facet arthropathy contributing to moderate central canal stenosis with moderate bilateral neural foraminal narrowing, worse on the right.    MRI bilateral hip 2024  No acetabular labral tear is visible on this non-arthrographic exam.     The bones exhibit normal marrow signal without evidence for  fracture, stress fracture, or osseous contusion.     The right hip demonstrates moderate chondral thinning without high-grade chondral defect identified.  There is mild subchondral cystic change of the acetabular roof.    There are low-grade strains of the left gluteus medius and minimus tendons at their greater trochanteric insertions.     Impression:     Moderate degenerative change of the left hip.  Low-grade strains of the left gluteus medius and minimus tendons.    Clinical Impression:  This is a pleasant 68 y.o. male patient with PMH/PSH of COPD, hypertension, obesity, acid reflux, long-term anticoagulation on Eliquis due to AFib, presenting with  axial low back pain in addition to uncomfortable /skin crawling feeling of the left thigh in addition to pulling sensation in medial aspect of the left thigh, he had limited relief from left hip injection which rule out  hip pathology, his MRI lumbar spine showed disc herniation at L4-L5 with bilateral neural foramina narrowing, worse in the right side,  he had lumbar epidural steroid injection at L5-S1 which provided him with significant improvement in pain and functionality, he does not use his cane anymore, he is very happy and satisfied with the current pain management plan.    Encounter Diagnosis:  Shawn Hand is a 68 y.o. male with the following diagnoses based on history, exam, and imaging:  There are no diagnoses linked to this encounter.      Treatment Plan:    Diagnostics/Referrals: continue with a home exercise    Medications:    NSAIDs: None  Topical Agent: No  TCA/SSRI/SNRI: None  Anti-convulsants: None  Muscle Relaxants: None  Opioids:  none    Interventional Therapy:  please  consider repeating lumbar epidural steroid injection at L5-S1  Sedation: Oral Sedation.   Anticogualtion drugs: Eliquis-Clearance to stop Blood thinner: 3 days    Regarding the above interventions, the patient has been educated regarding the risks (including bleeding,  infection, increased pain, nerve damage, or allergic reaction), benefits, and alternatives. The patient states he understands and is eager to proceed.    Physical Rehabilitation: Physician led exercise program and home exercise    Patient Education: Counseled patient regarding the importance of activity modification, I have stressed the importance of physical activity and a home exercise plan to help with pain and improve health.    Follow-up: RTC three-months    May consider:  EMG/nerve conduction study Left lower extremity, lumbar medial branch block, neurology referral, may consider Giuseppe Reed MD  Anesthesiologist  Interventional Pain Medicine  11/19/2024    Disclaimer:  This note was prepared using voice recognition system and is likely to have sound alike errors that may have been overlooked even after proof reading.  Please call me with any questions.

## 2024-11-19 NOTE — H&P (VIEW-ONLY)
EST patient evaluation  [facility]    Shawn HANNAH  Yazan  : 1955  Date: 2024     CHIEF COMPLAINT:  Low-back Pain    Referring Physician: No ref. provider found  Primary Care Physician: Jaison Tesfaye MD    HPI:  This is a 68 y.o. male with a chief complaint of Low-back Pain  . The patient has Past medical history/Past surgical history of  COPD, hypertension, obesity, acid reflux, long-term anticoagulation on Eliquis due to AFib    Patient was evaluated and referred by  primary care provider for low back pain,  left hip pain and left thigh pain.   MRI lumbar spine completed in  showed multilevel degenerative change of the lumbar spine, multilevel facet arthropathy,  most significant at L5-S1 with moderate central canal stenosis and moderate bilateral neural foramina narrowing.  MRI bilateral hip showed moderate degenerative change of the left hip.    Interval History 2024:  Shawn HANNAH Sr Yazan is here for  postprocedure follow up visit after left hip injection and left GTB injection under fluoroscopic guidance, patient reported 50% improvement in pain and functionality lasting 3-4 days.Current pain score: 4/10    Interval History 10/14/2024:  Shawn CAMDEN Arzate Yazan is here for procedure follow up visit after lumbar epidural steroid injection at L5-S1 on 2024,  patient reported 80% improvement in pain and functionality. Patient is able to walk without his cane. Patient is still feeling a slight pain in the lower back and inner thighs after sitting or walking too long but it is no where as severe as bad as it was prior.  Current pain score: 1/10    Interval History 2024:  Shawn Geero is here c/o slip and fall 10/20/24. Reports is having pain in the lower back.   Current pain score: 5/10    Diabetic: No    Anticogualtion drugs: Eliquis    Allergy To Iodine: No    Currently on Antibiotic: No    Current Description of Pain  Symptoms:    History of Recent Fall or Trauma: YES   Onset: Chronic, started a few years but unable to walk without cane starting on June 28, 2024  Pain Location: lower back  Radiates/associated symptoms: No radiation. Pain stays in lower back at base of spine, in center of back. Depending on certain movements, it has shocking sensations to the left side.  Pain is Getting worse over the last 2 months    The pain is described as aching, burning, numbing, stabbing, and tingling.   Exacerbating factors:  sitting for a long period of time -standing for a long period of time  Mitigating factors Nothing specific.   Symptoms interfere with daily activity, sleeping  The patient feels like symptoms have been worsening.   Patient denies night fever/night sweats, urinary incontinence, bowel incontinence, significant weight loss, significant motor weakness, and loss of sensations.    Pain score:  Best: 4/10  Worst: 8/10    Current pain medications:   Over-the-counter Tylenol  Current Narcotics/Opioid /benzo Medications:  Opioids- No  Benzodiazepines: No    UDS:  NA    PDMP:  Reviewed and consistent with medication use as prescribed.     Previous Chronic Pain Treatment History:  Physical Therapy/HEP/Physician Lead Exercise Program:  Over the past 12 months, Patient has done 0 sessions.  PT response: NA  Dates of the PT sessions: NA  Is patient participating in home exercise program (HEP)/ physician led exercise program: No.    Non-interventional Pain Therapy:  [x]Chiropractor: 2024, 15 sessions in July and August 2024  []Acupuncture/Dry needle.  [x]TENS unit.  []Heat/ICE.  []Back Brace.    Medications previously tried:  NSAIDs: None  Topical Agent: Yes  TCA/SSRI/SNRI: None  Anti-convulsants: Gabapentin  due to SE and weight gain.  Muscle Relaxants: None  Opioids- Hydrocodone with Acetaminophen (Norco) and Tramadol (Ultram).    Interventional Pain Procedures:  Left Hip GTB Injection 07/2024- mild relief  08/2024: left hip  "injection and left GTB injection under fluoroscopic guidance- 50% relief for  few days  09/13/2024: lumbar epidural steroid injection at L5-S1 -  80% relief   Previous spine/Relevant joint surgery:  Bilateral rotator cuff surgery  Surgical History:   has a past surgical history that includes Rotator cuff repair (Left); Cholecystectomy; Tonsillectomy; Coronary angioplasty with stent; Colonoscopy; Sinus surgery; Cardioversion; Rotator cuff repair (Right, 06/27/2017); Injection of joint (Left, 8/16/2024); and Epidural steroid injection into lumbar spine (N/A, 9/13/2024).  Medical History:   has a past medical history of Atrial fibrillation, COPD (chronic obstructive pulmonary disease), Coronary artery disease, GERD (gastroesophageal reflux disease), Hyperlipidemia, Hypertension, and Sleep apnea.  Family History:  family history includes Asthma in his mother; COPD in his mother; Heart disease in his father.  Allergies:  Niaspan extended-release [niacin], Amoxicillin-pot clavulanate, and Nystatin   Social History/SUBSTANCE ABUSE HISTORY:  Personal history of substance abuse: No   reports that he quit smoking about 9 years ago. His smoking use included cigarettes. He has been exposed to tobacco smoke. He has never used smokeless tobacco. He reports that he does not currently use alcohol. He reports that he does not use drugs.  LABS:  CBC  Lab Results   Component Value Date    WBC 10.01 06/22/2024    HGB 16.6 06/22/2024    HCT 49.5 06/22/2024     Coagulation Profile   Lab Results   Component Value Date     06/22/2024     No results found for: "PT", "PTT", "INR"  CMP:  BMP  Lab Results   Component Value Date     06/22/2024    K 4.3 06/22/2024     06/22/2024    CO2 26 06/22/2024    BUN 19 06/22/2024    CREATININE 1.0 06/22/2024    CALCIUM 10.0 06/22/2024    ANIONGAP 11 06/22/2024    EGFRNORACEVR >60 06/22/2024     Lab Results   Component Value Date    ALT 29 06/22/2024    AST 25 06/22/2024    ALKPHOS 47 " "(L) 06/22/2024    BILITOT 0.5 06/22/2024     HGBA1C:  Lab Results   Component Value Date    HGBA1C 5.5 08/11/2023       ROS:    Review of Systems   GENERAL:  No weight loss, malaise or fevers.  HEENT:   No recent changes in vision or hearing  NECK:  Negative for lumps, no difficulty with swallowing.  RESPIRATORY:  Negative for cough, wheezing or shortness of breath, patient denies any recent URI.  CARDIOVASCULAR:  Negative for chest pain or palpitations.  GI:  Negative for abdominal discomfort, blood in stools or black stools or change in bowel habits.  MUSCULOSKELETAL:  See HPI.  SKIN:  Negative for lesions, rash, and itching.  PSYCH:  No mood disorder or recent psychosocial stressors.   HEMATOLOGY/LYMPHOLOGY:  See the blood thinner sectioned in HPI.  NEURO:  See HPI  All other reviewed and negative other than HPI.    PHYSICAL EXAM:  VITALS: Ht 5' 10" (1.778 m)   Wt (!) 147.9 kg (326 lb)   BMI 46.78 kg/m²   Body mass index is 46.78 kg/m².  GENERAL: Well appearing, in no acute distress, alert and oriented x3, answers questions appropriately.   PSYCH: Flat affect.  SKIN: Skin color, texture, turgor normal, no rashes or lesions.  HEAD/FACE:  Normocephalic, atraumatic. Cranial nerves grossly intact.  CV: Regular rate  PULM: No evidence of respiratory difficulty, symmetric chest rise.  GI:  Soft and non-Distended.  BACK/SIJ/HIP:  Lumbar Spine Exam:       Inspection: No erythema, bruising.       Palpation: (+) TTP of lumbar paraspinals bilaterally      ROM:  Limited in flexion, extension, lateral bending.       (+) Facet loading bilaterally      (+) Straight Leg Raise bilaterally      (NA) JIM bilaterally, Tenderness over the PSIS, Yeoman test  Hip Exam:      Inspection: No gross deformity or apparent leg length discrepancy      Palpation:  No TTP to bilateral greater trochanteric bursas.       ROM:  No limitation or pain in internal rotation, external rotation b/l  Neurologic Exam:     Alert. Speech is fluent and " appropriate.     Strength: 3/5 in  left hip flexion and knee extension     Sensation:   decreased sensation to the left lower extremity      Tone: No abnormality appreciated in bilateral lower extremities  GAIT: Antalgic gait, unsteady gait, using cane    DIAGNOSTIC STUDIES AND MEDICAL RECORDS REVIEW:  I have personally reviewed and interpreted relevant radiology reports and reviewed relevant records from other services in the EMR.   -MRI lumbar spine 2024    At T12-L1, no disc herniation, central canal stenosis or neural foraminal narrowing.     At L1-2, no disc herniation, central canal stenosis or neural foraminal narrowing.     At L2-3 circumferential disc bulge with facet arthropathy contributing to mild left neural foraminal narrowing.  No central canal stenosis.     At L3-4, circumferential disc bulge extending into both neural foramina with facet arthropathy contributing to mild central canal stenosis with mild left neural foraminal narrowing.     At L4-5, circumferential disc bulge with ligamentum flavum hypertrophy and facet arthropathy contributing to minimal central canal stenosis without neural foraminal narrowing.     At L5-S1, circumferential disc bulge extending into both neural foramina with osseous spurring in the neural foramina and facet arthropathy contributing to moderate central canal stenosis with moderate bilateral neural foraminal narrowing, worse on the right.    MRI bilateral hip 2024  No acetabular labral tear is visible on this non-arthrographic exam.     The bones exhibit normal marrow signal without evidence for fracture, stress fracture, or osseous contusion.     The right hip demonstrates moderate chondral thinning without high-grade chondral defect identified.  There is mild subchondral cystic change of the acetabular roof.    There are low-grade strains of the left gluteus medius and minimus tendons at their greater trochanteric insertions.     Impression:     Moderate  degenerative change of the left hip.  Low-grade strains of the left gluteus medius and minimus tendons.    Clinical Impression:  This is a pleasant 68 y.o. male patient with PMH/PSH of COPD, hypertension, obesity, acid reflux, long-term anticoagulation on Eliquis due to AFib, presenting with  axial low back pain in addition to uncomfortable /skin crawling feeling of the left thigh in addition to pulling sensation in medial aspect of the left thigh, he had limited relief from left hip injection which rule out  hip pathology, his MRI lumbar spine showed disc herniation at L4-L5 with bilateral neural foramina narrowing, worse in the right side,  he had lumbar epidural steroid injection at L5-S1 which provided him with significant improvement in pain and functionality,  he noticed decreasing efficacy from injection,  no left lower extremity symptoms, mainly axial low back pain,  he completed more than 6 weeks of home exercise program/physician led exercise program    Encounter Diagnosis:  Shawn Hand is a 68 y.o. male with the following diagnoses based on history, exam, and imagin. Lumbar radiculitis    2. Lumbar facet arthropathy    Treatment Plan:    Diagnostics/Referrals: continue with a home exercise    Medications:    NSAIDs: None  Topical Agent: No  TCA/SSRI/SNRI: None  Anti-convulsants: None  Muscle Relaxants: None  Opioids:  none    Interventional Therapy:  lumbar epidural steroid injection at L5-S1, left sided   Sedation: Oral Sedation.   Anticogualtion drugs: Eliquis-Clearance to stop Blood thinner: 3 days    Regarding the above interventions, the patient has been educated regarding the risks (including bleeding, infection, increased pain, nerve damage, or allergic reaction), benefits, and alternatives. The patient states he understands and is eager to proceed.    Physical Rehabilitation: Physician led exercise program and home exercise    Patient Education: Counseled patient regarding the  importance of activity modification, I have stressed the importance of physical activity and a home exercise plan to help with pain and improve health.    Follow-up: RTC  4 weeks after injection    May consider:  lumbar medial branch block, neurology referral, may consider Giuseppe Reed MD  Anesthesiologist  Interventional Pain Medicine  11/19/2024    Disclaimer:  This note was prepared using voice recognition system and is likely to have sound alike errors that may have been overlooked even after proof reading.  Please call me with any questions.

## 2024-12-09 ENCOUNTER — TELEPHONE (OUTPATIENT)
Dept: PAIN MEDICINE | Facility: CLINIC | Age: 69
End: 2024-12-09
Payer: COMMERCIAL

## 2024-12-09 NOTE — TELEPHONE ENCOUNTER
----- Message from Trixie sent at 2024 10:03 AM CST -----  Contact: Wife, Sarah Hand  MRN: [id number]  : 1955  PCP: Jaison Tesfaye  Home Phone      [phone]  Work Phone      Not on file.  Mobile          [phone]      MESSAGE: Patient's wife would like a returned call to discuss his procedure Friday being denied by the insurance.     PHONE; [phone]

## 2024-12-09 NOTE — TELEPHONE ENCOUNTER
Spoke with wife Sarah. Informed her that I will check with Auth team regarding status of procedure. States he did stop eliquis today pending procedure Friday.

## 2024-12-13 RX ORDER — ALPRAZOLAM 0.25 MG/1
0.5 TABLET, ORALLY DISINTEGRATING ORAL
OUTPATIENT
Start: 2024-12-16

## 2024-12-16 ENCOUNTER — HOSPITAL ENCOUNTER (OUTPATIENT)
Facility: HOSPITAL | Age: 69
Discharge: HOME | End: 2024-12-16
Attending: ANESTHESIOLOGY | Admitting: ANESTHESIOLOGY
Payer: COMMERCIAL

## 2024-12-16 ENCOUNTER — HOSPITAL ENCOUNTER (OUTPATIENT)
Dept: RADIOLOGY | Facility: HOSPITAL | Age: 69
Discharge: HOME | End: 2024-12-16
Attending: ANESTHESIOLOGY | Admitting: ANESTHESIOLOGY
Payer: COMMERCIAL

## 2024-12-16 VITALS
DIASTOLIC BLOOD PRESSURE: 60 MMHG | RESPIRATION RATE: 16 BRPM | TEMPERATURE: 97 F | HEART RATE: 73 BPM | SYSTOLIC BLOOD PRESSURE: 142 MMHG | OXYGEN SATURATION: 100 %

## 2024-12-16 DIAGNOSIS — M54.16 LUMBAR RADICULITIS: ICD-10-CM

## 2024-12-16 DIAGNOSIS — M54.16 LUMBAR RADICULITIS: Primary | ICD-10-CM

## 2024-12-16 DIAGNOSIS — F41.9 ANXIETY: ICD-10-CM

## 2024-12-16 PROCEDURE — 62323 NJX INTERLAMINAR LMBR/SAC: CPT | Performed by: ANESTHESIOLOGY

## 2024-12-16 PROCEDURE — 63600175 PHARM REV CODE 636 W HCPCS: Performed by: ANESTHESIOLOGY

## 2024-12-16 PROCEDURE — 25500020 PHARM REV CODE 255: Performed by: ANESTHESIOLOGY

## 2024-12-16 PROCEDURE — 62323 NJX INTERLAMINAR LMBR/SAC: CPT | Mod: ,,, | Performed by: ANESTHESIOLOGY

## 2024-12-16 PROCEDURE — 76000 FLUOROSCOPY <1 HR PHYS/QHP: CPT | Mod: TC

## 2024-12-16 RX ORDER — DEXAMETHASONE SODIUM PHOSPHATE 10 MG/ML
INJECTION INTRAMUSCULAR; INTRAVENOUS
Status: DISCONTINUED | OUTPATIENT
Start: 2024-12-16 | End: 2024-12-16 | Stop reason: HOSPADM

## 2024-12-16 RX ORDER — LIDOCAINE HYDROCHLORIDE 20 MG/ML
INJECTION, SOLUTION INFILTRATION; PERINEURAL
Status: DISCONTINUED | OUTPATIENT
Start: 2024-12-16 | End: 2024-12-16 | Stop reason: HOSPADM

## 2024-12-16 RX ORDER — LIDOCAINE HYDROCHLORIDE 10 MG/ML
INJECTION, SOLUTION EPIDURAL; INFILTRATION; INTRACAUDAL; PERINEURAL
Status: DISCONTINUED | OUTPATIENT
Start: 2024-12-16 | End: 2024-12-16 | Stop reason: HOSPADM

## 2024-12-16 NOTE — DISCHARGE SUMMARY
Discharge Note  Short Stay    Admit Date: 12/16/2024    Attending Physician: Stu Reed    Discharge Physician: Stu Reed    Discharge Date: 12/16/2024 11:53 AM    Procedure(s) (LRB):  LUMBAR EPIDURAL STEROID INJECTION (L5-S1) (ORAL SEDATION) (ELIQUIS) (Left)    Final Diagnosis: Lumbar radiculitis [M54.16]    Disposition: Home or self care    Patient Instructions:   Current Discharge Medication List        CONTINUE these medications which have NOT CHANGED    Details   albuterol (PROVENTIL) 2.5 mg /3 mL (0.083 %) nebulizer solution Take 3 mLs (2.5 mg total) by nebulization every 6 (six) hours as needed for Wheezing. Rescue  Qty: 90 mL, Refills: 11      b complex vitamins capsule Take 1 capsule by mouth once daily.       !! budesonide-formoterol 160-4.5 mcg (SYMBICORT) 160-4.5 mcg/actuation HFAA Inhale 2 puffs into the lungs every 12 (twelve) hours. Controller  Qty: 10.2 g, Refills: 5    Associated Diagnoses: Pulmonary emphysema, unspecified emphysema type      colchicine (COLCRYS) 0.6 mg tablet TAKE 1 TABLET BY MOUTH EVERY DAY AS NEEDED FOR GOUT PAIN  Qty: 30 tablet, Refills: 5    Associated Diagnoses: Gout, arthritis      diltiaZEM (TIAZAC) 360 MG Cs24 Take 360 mg by mouth.      esomeprazole (NEXIUM) 40 MG capsule Take 1 capsule (40 mg total) by mouth before breakfast.  Qty: 90 capsule, Refills: 3      fish oil-dha-epa 1,200-144-216 mg Cap Take 1 tablet by mouth Daily. 2 Capsule Oral Every day      mometasone-formoterol (DULERA) 50-5 mcg/actuation HFAA Inhale 2 puffs into the lungs 2 (two) times a day.  Qty: 13 g, Refills: 5      montelukast (SINGULAIR) 10 mg tablet Take 1 tablet (10 mg total) by mouth every evening.  Qty: 90 tablet, Refills: 3    Associated Diagnoses: Seasonal allergic rhinitis due to pollen      multivitamin (THERAGRAN) per tablet Take 2 tablets by mouth Daily. 1 Tablet Oral Every day      olmesartan-hydrochlorothiazide (BENICAR HCT) 40-25 mg per tablet Take 1 tablet by mouth Daily. 1  Tablet Oral Every day      pravastatin (PRAVACHOL) 40 MG tablet Take 1 tablet by mouth Daily. 1 Tablet Oral Every day      !! SYMBICORT 160-4.5 mcg/actuation HFAA Inhale 2 puffs into the lungs every 12 (twelve) hours. Controller  Qty: 10.2 g, Refills: 11    Associated Diagnoses: Chronic obstructive pulmonary disease with acute exacerbation      albuterol (VENTOLIN HFA) 90 mcg/actuation inhaler Inhale 2 puffs into the lungs every 6 (six) hours as needed for Wheezing. Rescue  Qty: 18 g, Refills: 5    Associated Diagnoses: Pulmonary emphysema, unspecified emphysema type      allopurinoL (ZYLOPRIM) 100 MG tablet Take 1 tablet (100 mg total) by mouth once daily.  Qty: 90 tablet, Refills: 0    Associated Diagnoses: Gout, arthritis      cetirizine (ZYRTEC) 10 MG tablet Take 1 tablet by mouth Daily. 1 Tablet Oral Every day      ELIQUIS 5 mg Tab Take 5 mg by mouth 2 (two) times daily.      sildenafil (VIAGRA) 100 MG tablet PLEASE SEE ATTACHED FOR DETAILED DIRECTIONS  Refills: 3       !! - Potential duplicate medications found. Please discuss with provider.          Discharge Diagnosis: Lumbar radiculitis [M54.16]  Condition on Discharge: Stable with no complications to procedure   Diet on Discharge: Same as before.  Activity: as per instruction sheet.  Discharge to: Home with a responsible adult.  Follow up: 2-4 weeks       Please call my office or pager at [phone] if experienced any weakness or loss of sensation, fever > 101.5, pain uncontrolled with oral medications, persistent nausea/vomiting/or diarrhea, redness or drainage from the incisions, or any other worrisome concerns. If physician on call was not reached or could not communicate with our office for any reason please go to the nearest emergency department.     Stu Reed  12/16/2024

## 2024-12-16 NOTE — OP NOTE
Lumbar Interlaminar Epidural Steroid Injection under Fluoroscopic Guidance    The procedure, risks, benefits, and options were discussed with the patient. There are no contraindications to the procedure. The patent expressed understanding and agreed to the procedure. Informed written consent was obtained prior to the start of the procedure and can be found in the patient's chart.    PATIENT NAME: Shawn Hand   MRN: [id number]     DATE OF PROCEDURE: 12/16/2024    PROCEDURE: Lumbar Interlaminar Epidural Steroid Injection L5/S1 under Fluoroscopic Guidance    PRE-OP DIAGNOSIS: Lumbar radiculitis [M54.16] Lumbar radiculopathy [M54.16]    POST-OP DIAGNOSIS: Same    PHYSICIAN: Stu Reed MD    MEDICATIONS INJECTED: Preservative-free Decadron 10mg with 4cc of Lidocaine 1% MPF and preservative free normal saline    LOCAL ANESTHETIC INJECTED: Xylocaine 2%     SEDATION: None    ESTIMATED BLOOD LOSS: None    COMPLICATIONS: None    TECHNIQUE: Time-out was performed to identify the patient and procedure to be performed. With the patient laying in a prone position, the surgical area was prepped and draped in the usual sterile fashion using ChloraPrep and a fenestrated drape. The level was determined under fluoroscopy guidance. Skin anesthesia was achieved by injecting Lidocaine 2% over the injection site. The interlaminar space was then approached with a 20 gauge,  5 inch Tuohy needle that was introduced under fluoroscopic guidance in the AP, lateral and/or contralateral oblique imaging. Once the Ligamentum flavum was encountered loss of resistance to air was used to enter the epidural space. With positive loss of resistance and negative aspiration for CSF or Blood, contrast dye Omnipaque (300mg/mL) was injected to confirm placement and there was no vascular runoff. 5 mL of the medication mixture listed above was injected slowly. Displacement of the radio opaque contrast after injection of the medication confirmed that  the medication went into the area of the epidural space. The needles were removed and bleeding was nil. A sterile dressing was applied. No specimens collected. The patient tolerated the procedure well.       The patient was monitored after the procedure in the recovery area. They were given post-procedure and discharge instructions to follow at home. The patient was discharged in a stable condition.    Stu Reed MD

## 2024-12-16 NOTE — DISCHARGE INSTRUCTIONS
DIET: You may resume your normal diet today.    BATHING: You may resume your normal bathing.          You may shower, no hot water directly on site for 24 hours.    DRESSING: You may remove your bandage today.    ACTIVITY LEVEL: You may resume your normal activities 24 hours after your  procedure.    If you have received sedation or an anesthetic, you may feel sleepy for several hours. Rest until you are more awake. Gradually resume your normal activities tomorrow.    If you have received sedation or an anesthetic, do not drive or operate heavy machinery for at least 24 hours.    MEDICATION: You may resume your normal medications today.    You will receive instructions for any pain prescriptions. Pain medications should be taken only as directed.    SPECIAL INSTRUCTIONS: No heat to the injection site for 24 hours including: bath or shower, heating pad, moist heat, hot tubs.    Use ice pack to injection site for any pain or discomfort. Apply ice pack to 20 minutes then remove for 20 minutes before re-applying to site.    WHEN TO CALL DOCTOR: Redness or swelling around injection site    Fever of 101F    Drainage (pus) from the injection site    For any continuous bleeding (some dried blood over the incision is normal).    FOLLOW UP: Follow up phone call will be made by office.    FOR EMERGENCIES: If any unusual problems or difficulties occur during clinic hours, call [phone] or [phone].

## 2024-12-20 LAB
CHOLEST SERPL-MSCNC: 117 MG/DL (ref 0–200)
CHOLEST/HDLC SERPL: 2.9 {RATIO}
HDLC SERPL-MCNC: 40 MG/DL (ref 35–70)
LDL CHOLESTEROL DIRECT: 69 MG/DL
NONHDLC SERPL-MCNC: 77 MG/DL
TRIGL SERPL-MCNC: 132 MG/DL (ref 40–160)
VLDL CHOLESTEROL: 26 MG/DL

## 2025-01-04 ENCOUNTER — PATIENT MESSAGE (OUTPATIENT)
Dept: FAMILY MEDICINE | Facility: CLINIC | Age: 70
End: 2025-01-04
Payer: COMMERCIAL

## 2025-01-04 DIAGNOSIS — J44.1 CHRONIC OBSTRUCTIVE PULMONARY DISEASE WITH ACUTE EXACERBATION: ICD-10-CM

## 2025-01-04 DIAGNOSIS — J43.9 PULMONARY EMPHYSEMA, UNSPECIFIED EMPHYSEMA TYPE: ICD-10-CM

## 2025-01-06 RX ORDER — BUDESONIDE AND FORMOTEROL FUMARATE DIHYDRATE 160; 4.5 UG/1; UG/1
2 AEROSOL RESPIRATORY (INHALATION) EVERY 12 HOURS
Qty: 30.6 G | Refills: 11 | Status: SHIPPED | OUTPATIENT
Start: 2025-01-06 | End: 2026-01-06

## 2025-02-03 ENCOUNTER — PATIENT OUTREACH (OUTPATIENT)
Dept: ADMINISTRATIVE | Facility: HOSPITAL | Age: 70
End: 2025-02-03
Payer: COMMERCIAL

## 2025-02-03 NOTE — PROGRESS NOTES
External lab report received, uploaded to chart and  hyper-linked in .  Cardiology note uploaded to media

## 2025-03-05 DIAGNOSIS — M10.9 GOUT, ARTHRITIS: ICD-10-CM

## 2025-03-05 RX ORDER — ALLOPURINOL 100 MG/1
100 TABLET ORAL DAILY
Qty: 90 TABLET | Refills: 0 | Status: SHIPPED | OUTPATIENT
Start: 2025-03-05

## 2025-03-05 NOTE — TELEPHONE ENCOUNTER
Pt requesting medication refill.   LOV:   08/15/24                   Pharm: [id number] - [facility] pended. Thanks  Requested Prescriptions     Pending Prescriptions Disp Refills    allopurinoL (ZYLOPRIM) 100 MG tablet 90 tablet 0     Sig: Take 1 tablet (100 mg total) by mouth once daily.

## 2025-03-05 NOTE — TELEPHONE ENCOUNTER
Care Due:                  Date            Visit Type   Department     Provider  --------------------------------------------------------------------------------                                EP -                              PRIMARY      [facility] FAMILY  Last Visit: 08-      CARE ([facility])   MEDICINE       Jaison Tesfaye  Next Visit: None Scheduled  None         None Found                                                            Last  Test          Frequency    Reason                     Performed    Due Date  --------------------------------------------------------------------------------    Uric Acid...  12 months..  allopurinoL..............  Not Found    Overdue    [employer] Embedded Care Due Messages. Reference number: [id number].   3/05/2025 10:27:35 AM CST

## 2025-03-05 NOTE — TELEPHONE ENCOUNTER
----- Message from [facility] sent at 3/5/2025  9:48 AM CST -----  Contact: patient  Shawn HANNAH Sr TodaroMRN: [id number]: 1955PCP: Jaison TesfayeHome Phone      [phone] Phone      Not on file.Mobile          [phone]: Pt requesting refill or new Rx. Is this a refill or new RX:  new rx RX name and strength: allopurinoL (ZYLOPRIM) 100 MG tablet Last office visit: 08/05/2024 Is this a 30-day or 90-day RX:  90 tabletsPharmacy name and location:  [id number] [address] Comments:  rx is expiredPhone:  [phone]

## 2025-04-28 DIAGNOSIS — Z00.00 ENCOUNTER FOR MEDICARE ANNUAL WELLNESS EXAM: ICD-10-CM

## 2025-06-04 ENCOUNTER — OFFICE VISIT (OUTPATIENT)
Dept: FAMILY MEDICINE | Facility: CLINIC | Age: 70
End: 2025-06-04
Payer: MEDICARE

## 2025-06-04 ENCOUNTER — CLINICAL SUPPORT (OUTPATIENT)
Dept: FAMILY MEDICINE | Facility: CLINIC | Age: 70
End: 2025-06-04
Payer: MEDICARE

## 2025-06-04 VITALS
HEIGHT: 70 IN | BODY MASS INDEX: 45.1 KG/M2 | WEIGHT: 315 LBS | HEART RATE: 80 BPM | OXYGEN SATURATION: 95 % | DIASTOLIC BLOOD PRESSURE: 65 MMHG | RESPIRATION RATE: 17 BRPM | SYSTOLIC BLOOD PRESSURE: 112 MMHG

## 2025-06-04 DIAGNOSIS — E66.01 MORBID OBESITY: ICD-10-CM

## 2025-06-04 DIAGNOSIS — I10 PRIMARY HYPERTENSION: ICD-10-CM

## 2025-06-04 DIAGNOSIS — J43.9 PULMONARY EMPHYSEMA, UNSPECIFIED EMPHYSEMA TYPE: ICD-10-CM

## 2025-06-04 DIAGNOSIS — Z12.5 SCREENING FOR PROSTATE CANCER: ICD-10-CM

## 2025-06-04 DIAGNOSIS — E78.5 DYSLIPIDEMIA: ICD-10-CM

## 2025-06-04 DIAGNOSIS — G47.33 OSA (OBSTRUCTIVE SLEEP APNEA): ICD-10-CM

## 2025-06-04 DIAGNOSIS — I48.19 PERSISTENT ATRIAL FIBRILLATION: Primary | ICD-10-CM

## 2025-06-04 PROCEDURE — 1159F MED LIST DOCD IN RCRD: CPT | Mod: CPTII,HCNC,S$GLB, | Performed by: FAMILY MEDICINE

## 2025-06-04 PROCEDURE — 36415 COLL VENOUS BLD VENIPUNCTURE: CPT | Mod: HCNC,S$GLB,, | Performed by: FAMILY MEDICINE

## 2025-06-04 PROCEDURE — 3074F SYST BP LT 130 MM HG: CPT | Mod: CPTII,HCNC,S$GLB, | Performed by: FAMILY MEDICINE

## 2025-06-04 PROCEDURE — 84153 ASSAY OF PSA TOTAL: CPT | Mod: HCNC

## 2025-06-04 PROCEDURE — 1101F PT FALLS ASSESS-DOCD LE1/YR: CPT | Mod: CPTII,HCNC,S$GLB, | Performed by: FAMILY MEDICINE

## 2025-06-04 PROCEDURE — 3078F DIAST BP <80 MM HG: CPT | Mod: CPTII,HCNC,S$GLB, | Performed by: FAMILY MEDICINE

## 2025-06-04 PROCEDURE — 99999 PR PBB SHADOW E&M-EST. PATIENT-LVL IV: CPT | Mod: PBBFAC,HCNC,, | Performed by: FAMILY MEDICINE

## 2025-06-04 PROCEDURE — 1126F AMNT PAIN NOTED NONE PRSNT: CPT | Mod: CPTII,HCNC,S$GLB, | Performed by: FAMILY MEDICINE

## 2025-06-04 PROCEDURE — 99214 OFFICE O/P EST MOD 30 MIN: CPT | Mod: HCNC,S$GLB,, | Performed by: FAMILY MEDICINE

## 2025-06-04 PROCEDURE — 80053 COMPREHEN METABOLIC PANEL: CPT | Mod: HCNC

## 2025-06-04 PROCEDURE — 3008F BODY MASS INDEX DOCD: CPT | Mod: CPTII,HCNC,S$GLB, | Performed by: FAMILY MEDICINE

## 2025-06-04 PROCEDURE — 3288F FALL RISK ASSESSMENT DOCD: CPT | Mod: CPTII,HCNC,S$GLB, | Performed by: FAMILY MEDICINE

## 2025-06-04 PROCEDURE — G2211 COMPLEX E/M VISIT ADD ON: HCPCS | Mod: HCNC,S$GLB,, | Performed by: FAMILY MEDICINE

## 2025-06-04 RX ORDER — HYDROCODONE BITARTRATE AND ACETAMINOPHEN 5; 325 MG/1; MG/1
1-2 TABLET ORAL EVERY 6 HOURS PRN
COMMUNITY
Start: 2025-05-07

## 2025-06-05 ENCOUNTER — RESULTS FOLLOW-UP (OUTPATIENT)
Dept: FAMILY MEDICINE | Facility: CLINIC | Age: 70
End: 2025-06-05

## 2025-06-05 LAB
ALBUMIN SERPL BCP-MCNC: 4.1 G/DL (ref 3.5–5.2)
ALP SERPL-CCNC: 61 UNIT/L (ref 40–150)
ALT SERPL W/O P-5'-P-CCNC: 35 UNIT/L (ref 10–44)
ANION GAP (OHS): 9 MMOL/L (ref 8–16)
AST SERPL-CCNC: 28 UNIT/L (ref 11–45)
BILIRUB SERPL-MCNC: 0.4 MG/DL (ref 0.1–1)
BUN SERPL-MCNC: 17 MG/DL (ref 8–23)
CALCIUM SERPL-MCNC: 9.6 MG/DL (ref 8.7–10.5)
CHLORIDE SERPL-SCNC: 105 MMOL/L (ref 95–110)
CO2 SERPL-SCNC: 25 MMOL/L (ref 23–29)
CREAT SERPL-MCNC: 1 MG/DL (ref 0.5–1.4)
GFR SERPLBLD CREATININE-BSD FMLA CKD-EPI: >60 ML/MIN/1.73/M2
GLUCOSE SERPL-MCNC: 104 MG/DL (ref 70–110)
POTASSIUM SERPL-SCNC: 4.1 MMOL/L (ref 3.5–5.1)
PROT SERPL-MCNC: 7.6 GM/DL (ref 6–8.4)
PSA SERPL-MCNC: 0.9 NG/ML
SODIUM SERPL-SCNC: 139 MMOL/L (ref 136–145)

## 2025-06-10 ENCOUNTER — HOSPITAL ENCOUNTER (OUTPATIENT)
Dept: SLEEP MEDICINE | Facility: HOSPITAL | Age: 70
Discharge: HOME | End: 2025-06-10
Attending: FAMILY MEDICINE
Payer: MEDICARE

## 2025-06-10 DIAGNOSIS — G47.33 OSA (OBSTRUCTIVE SLEEP APNEA): ICD-10-CM

## 2025-06-10 PROCEDURE — 95810 POLYSOM 6/> YRS 4/> PARAM: CPT | Mod: HCNC

## 2025-06-24 DIAGNOSIS — M10.9 GOUT, ARTHRITIS: ICD-10-CM

## 2025-06-24 DIAGNOSIS — G47.33 OSA (OBSTRUCTIVE SLEEP APNEA): Primary | ICD-10-CM

## 2025-06-24 RX ORDER — ALLOPURINOL 100 MG/1
100 TABLET ORAL DAILY
Qty: 90 TABLET | Refills: 0 | Status: SHIPPED | OUTPATIENT
Start: 2025-06-24

## 2025-06-24 RX ORDER — COLCHICINE 0.6 MG/1
0.6 TABLET ORAL DAILY
Qty: 30 TABLET | Refills: 5 | Status: SHIPPED | OUTPATIENT
Start: 2025-06-24

## 2025-06-24 NOTE — TELEPHONE ENCOUNTER
Care Due:                  Date            Visit Type   Department     Provider  --------------------------------------------------------------------------------                                EP -                              [facility]  Last Visit: 06-      CARE ([facility])   LEE Tesfaye                               -                              [facility]  Next Visit: 12-      CARE ([facility])   LEE Tesfaye                                                            Last  Test          Frequency    Reason                     Performed    Due Date  --------------------------------------------------------------------------------    CBC.........  12 months..  allopurinoL..............  06- 06-    Uric Acid...  12 months..  allopurinoL..............  Not Found    Overdue    [employer] Embedded Care Due Messages. Reference number: [id number].   6/24/2025 9:29:41 AM CDT

## 2025-06-24 NOTE — TELEPHONE ENCOUNTER
----- Message from Roman sent at 2025  8:40 AM CDT -----  Contact: Patient  Shawn Hand  MRN: [id number]  : 1955  PCP: Jaison Tesfaye  Home Phone      [phone]  Work Phone      Not on file.  Mobile          [phone]      MESSAGE:   Pt requesting refill or new Rx.   Is this a refill or new RX:  refills  RX name and strength: Allopurinol 100 mg                                        Colchicine 0.6 mg  Last office visit: 25  Is this a 30-day or 90-day RX:  90 day / 30 day  [facility] [place]  Comments:      Phone:  [phone]    PCP: Mera

## 2025-06-24 NOTE — TELEPHONE ENCOUNTER
Pt requesting medication refill.   LOV:  06/04/25                     Pharm: [facility] in [facility] pended. Thanks  Requested Prescriptions     Pending Prescriptions Disp Refills    allopurinoL (ZYLOPRIM) 100 MG tablet 90 tablet 0     Sig: Take 1 tablet (100 mg total) by mouth once daily.    colchicine (COLCRYS) 0.6 mg tablet 30 tablet 5     Sig: Take 1 tablet (0.6 mg total) by mouth once daily.

## 2025-06-26 ENCOUNTER — TELEPHONE (OUTPATIENT)
Dept: FAMILY MEDICINE | Facility: CLINIC | Age: 70
End: 2025-06-26
Payer: MEDICARE

## 2025-06-26 DIAGNOSIS — H91.90 HEARING LOSS, UNSPECIFIED HEARING LOSS TYPE, UNSPECIFIED LATERALITY: Primary | ICD-10-CM

## 2025-06-26 DIAGNOSIS — Z12.11 COLON CANCER SCREENING: ICD-10-CM

## 2025-06-26 NOTE — TELEPHONE ENCOUNTER
----- Message from Roman sent at 2025 10:08 AM CDT -----  Contact: Patient  Shawn Hand  MRN: [id number]  : 1955  PCP: Jaison Tesfaye  Home Phone      [phone]  Work Phone      Not on file.  Mobile          [phone]      MESSAGE: requesting hearing test & colonoscopy -- please advise    Call Sarah @ [phone]    PCP: Mera

## 2025-06-26 NOTE — TELEPHONE ENCOUNTER
Contacted/spoke to pt's spouse (Sarah), requesting referral to ENT or Audiology for hearing test with any provider pcp would recommend. Also requesting to schedule c-scope with Dr. Tesfaye. Previous colonoscopy completed by Dr. Parra.     Please advise if okay to schedule c-scope. Thanks

## 2025-06-27 NOTE — TELEPHONE ENCOUNTER
----- Message from Jaison Tesfaye MD sent at 6/26/2025  7:59 PM CDT -----  YEN. He will need a cpap titration study to determine the optimal pressure for his cpap machine   Orders are in please schedule thanks       ----- Message -----  From: Stephanie James RRT  Sent: 6/20/2025  12:49 PM CDT  To: Jaison Tesfaye MD    You may need to put in a ticket. The link works for me on my end. Ill fax this one as well  ----- Message -----  From: Jaison Tesfaye MD  Sent: 6/19/2025   7:49 PM CDT  To: Stephanie James RRT    Hyperlink is not working.....  AD   ----- Message -----  From: Stephanie James RRT  Sent: 6/16/2025   6:29 AM CDT  To: Jaison Tesfaye MD

## 2025-06-27 NOTE — PROGRESS NOTES
YEN. He will need a cpap titration study to determine the optimal pressure for his cpap machine   Orders are in please schedule thanks

## 2025-07-02 RX ORDER — ALBUTEROL SULFATE 0.83 MG/ML
2.5 SOLUTION RESPIRATORY (INHALATION) EVERY 6 HOURS PRN
Qty: 90 ML | Refills: 11 | Status: SHIPPED | OUTPATIENT
Start: 2025-07-02

## 2025-07-02 NOTE — TELEPHONE ENCOUNTER
No care due was identified.  [facility] Embedded Care Due Messages. Reference number: [id number].   7/02/2025 8:48:27 AM CDT

## 2025-07-03 NOTE — TELEPHONE ENCOUNTER
Contacted/spoke to pt's spouse (Sarah) to schedule colonoscopy. Procedure scheduled for 09/23 with Dr. Tesfaye. Pre op appt pending, sx nurse out today.   Appt dates will be given to pt's spouse appt.

## 2025-07-15 ENCOUNTER — HOSPITAL ENCOUNTER (OUTPATIENT)
Dept: SLEEP MEDICINE | Facility: HOSPITAL | Age: 70
Discharge: HOME | End: 2025-07-15
Attending: FAMILY MEDICINE
Payer: MEDICARE

## 2025-07-15 DIAGNOSIS — G47.33 OSA (OBSTRUCTIVE SLEEP APNEA): ICD-10-CM

## 2025-07-15 PROCEDURE — 95811 POLYSOM 6/>YRS CPAP 4/> PARM: CPT | Mod: HCNC

## 2025-07-23 NOTE — TELEPHONE ENCOUNTER
No care due was identified.  [facility] Embedded Care Due Messages. Reference number: [id number].   7/23/2025 6:11:15 PM CDT   Self

## 2025-07-24 DIAGNOSIS — G47.33 OSA (OBSTRUCTIVE SLEEP APNEA): Primary | ICD-10-CM

## 2025-07-24 RX ORDER — ESOMEPRAZOLE MAGNESIUM 40 MG/1
40 CAPSULE, DELAYED RELEASE ORAL
Qty: 90 CAPSULE | Refills: 3 | Status: SHIPPED | OUTPATIENT
Start: 2025-07-24

## 2025-07-25 ENCOUNTER — TELEPHONE (OUTPATIENT)
Dept: FAMILY MEDICINE | Facility: CLINIC | Age: 70
End: 2025-07-25
Payer: MEDICARE

## 2025-07-25 NOTE — TELEPHONE ENCOUNTER
----- Message from Jaison Tesfaye MD sent at 7/24/2025  7:57 PM CDT -----  Best on his test results, he has YEN with a cpap requirement of 13 cm. Order for that is in. Plesae set I'm up with DME of his choice.  Thanks      ----- Message -----  From: Stephanie James, JAZMYN  Sent: 7/23/2025   8:28 AM CDT  To: Jaison Tesfaye MD

## 2025-07-25 NOTE — TELEPHONE ENCOUNTER
Contacted/spoke to pt and notified of providers recommendations. Cpap order and clinical notes faxed to [facility] at [phone] per pt's request. Fax confirmation received.

## 2025-08-06 ENCOUNTER — OFFICE VISIT (OUTPATIENT)
Dept: FAMILY MEDICINE | Facility: CLINIC | Age: 70
End: 2025-08-06
Payer: MEDICARE

## 2025-08-06 VITALS
HEART RATE: 78 BPM | WEIGHT: 315 LBS | OXYGEN SATURATION: 97 % | RESPIRATION RATE: 16 BRPM | HEIGHT: 70 IN | BODY MASS INDEX: 45.1 KG/M2 | DIASTOLIC BLOOD PRESSURE: 82 MMHG | SYSTOLIC BLOOD PRESSURE: 128 MMHG

## 2025-08-06 DIAGNOSIS — S16.1XXA STRAIN OF STERNOCLEIDOMASTOID MUSCLE, INITIAL ENCOUNTER: ICD-10-CM

## 2025-08-06 DIAGNOSIS — I48.19 PERSISTENT ATRIAL FIBRILLATION: ICD-10-CM

## 2025-08-06 DIAGNOSIS — J02.9 SORE THROAT: Primary | ICD-10-CM

## 2025-08-06 PROCEDURE — 99999 PR PBB SHADOW E&M-EST. PATIENT-LVL IV: CPT | Mod: PBBFAC,HCNC,, | Performed by: FAMILY MEDICINE

## 2025-08-06 RX ORDER — CYCLOBENZAPRINE HCL 10 MG
10 TABLET ORAL 3 TIMES DAILY PRN
Qty: 30 TABLET | Refills: 2 | Status: SHIPPED | OUTPATIENT
Start: 2025-08-06 | End: 2025-09-05

## 2025-08-06 RX ORDER — METHYLPREDNISOLONE ACETATE 40 MG/ML
60 INJECTION, SUSPENSION INTRA-ARTICULAR; INTRALESIONAL; INTRAMUSCULAR; SOFT TISSUE
Status: COMPLETED | OUTPATIENT
Start: 2025-08-06 | End: 2025-08-06

## 2025-08-06 RX ADMIN — METHYLPREDNISOLONE ACETATE 60 MG: 40 INJECTION, SUSPENSION INTRA-ARTICULAR; INTRALESIONAL; INTRAMUSCULAR; SOFT TISSUE at 10:08

## 2025-08-06 NOTE — PROGRESS NOTES
Subjective:       Patient ID: Shawn Hand is a 69 y.o. male.    Chief Complaint: Neck Swelling (Pt here for neck swelling. )    Pt is a 69 y.o. male who presents for evaluation and management of   Encounter Diagnoses   Name Primary?    Sore throat Yes    Strain of sternocleidomastoid muscle, initial encounter     Persistent atrial fibrillation    .started Monday with s/T on the left. Radiates to his ear and feels some swollen glands   No cough or rhinorrhea   No sick contacts     Doing well on current meds. Denies any side effects. Prevention is up to date.    Review of Systems   Constitutional:  Negative for fever.   HENT:  Positive for sore throat.    Respiratory:  Negative for cough.    Musculoskeletal:  Positive for neck pain.       Objective:      Physical Exam  Constitutional:       Appearance: Normal appearance. He is well-developed. He is obese. He is not ill-appearing.   HENT:      Head: Normocephalic and atraumatic.      Right Ear: External ear normal.      Left Ear: External ear normal.      Nose: Nose normal.      Mouth/Throat:      Mouth: Mucous membranes are moist.      Pharynx: No oropharyngeal exudate or posterior oropharyngeal erythema.   Eyes:      General: No scleral icterus.        Right eye: No discharge.         Left eye: No discharge.      Conjunctiva/sclera: Conjunctivae normal.      Pupils: Pupils are equal, round, and reactive to light.   Neck:      Thyroid: No thyromegaly.      Vascular: No JVD.      Trachea: No tracheal deviation.   Cardiovascular:      Rate and Rhythm: Normal rate. Rhythm irregular.      Heart sounds: Normal heart sounds. No murmur heard.  Pulmonary:      Effort: Pulmonary effort is normal. No respiratory distress.      Breath sounds: Normal breath sounds. No wheezing or rales.   Chest:      Chest wall: No tenderness.   Abdominal:      General: Bowel sounds are normal. There is no distension.      Palpations: Abdomen is soft. There is no mass.      Tenderness:  There is no abdominal tenderness. There is no guarding or rebound.   Musculoskeletal:         General: Tenderness present. Normal range of motion.      Cervical back: Normal range of motion and neck supple.      Right lower leg: No edema.      Left lower leg: No edema.   Lymphadenopathy:      Cervical: No cervical adenopathy.   Skin:     General: Skin is warm and dry.   Neurological:      Mental Status: He is alert and oriented to person, place, and time.      Cranial Nerves: No cranial nerve deficit.      Motor: No abnormal muscle tone.      Coordination: Coordination normal.      Deep Tendon Reflexes: Reflexes are normal and symmetric. Reflexes normal.   Psychiatric:         Behavior: Behavior normal.         Thought Content: Thought content normal.         Judgment: Judgment normal.         Assessment:       1. Sore throat    2. Strain of sternocleidomastoid muscle, initial encounter    3. Persistent atrial fibrillation        Plan:   1. Sore throat    2. Strain of sternocleidomastoid muscle, initial encounter  -     methylPREDNISolone acetate injection 60 mg  -     cyclobenzaprine (FLEXERIL) 10 MG tablet; Take 1 tablet (10 mg total) by mouth 3 (three) times daily as needed for Muscle spasms.  Dispense: 30 tablet; Refill: 2    3. Persistent atrial fibrillation  Overview:  Eliquis   Diltiazem   Sees [facility]         Colonoscopy ---This procedure has been fully reviewed with the patient and written informed consent has been obtained.     No follow-ups on file.

## (undated) DEVICE — CHLORAPREP 10.5 ML APPLICATOR

## (undated) DEVICE — KIT NERVE BLOCK PREP BAPTIST

## (undated) DEVICE — MARKER SKIN RULER AND LABEL

## (undated) DEVICE — NDL TUOHY 20GA X 4.5 INCH